# Patient Record
Sex: FEMALE | Race: WHITE | Employment: OTHER | URBAN - METROPOLITAN AREA
[De-identification: names, ages, dates, MRNs, and addresses within clinical notes are randomized per-mention and may not be internally consistent; named-entity substitution may affect disease eponyms.]

---

## 2018-03-26 ENCOUNTER — APPOINTMENT (OUTPATIENT)
Dept: CT IMAGING | Age: 78
End: 2018-03-26
Payer: MEDICARE

## 2018-03-26 ENCOUNTER — APPOINTMENT (OUTPATIENT)
Dept: GENERAL RADIOLOGY | Age: 78
End: 2018-03-26
Payer: MEDICARE

## 2018-03-26 ENCOUNTER — HOSPITAL ENCOUNTER (EMERGENCY)
Age: 78
Discharge: HOME | End: 2018-03-26
Attending: EMERGENCY MEDICINE
Payer: MEDICARE

## 2018-03-26 VITALS
HEART RATE: 66 BPM | DIASTOLIC BLOOD PRESSURE: 71 MMHG | HEIGHT: 63 IN | OXYGEN SATURATION: 99 % | TEMPERATURE: 98.1 F | BODY MASS INDEX: 22.15 KG/M2 | RESPIRATION RATE: 18 BRPM | WEIGHT: 125 LBS | SYSTOLIC BLOOD PRESSURE: 132 MMHG

## 2018-03-26 DIAGNOSIS — R42 DIZZINESS: ICD-10-CM

## 2018-03-26 DIAGNOSIS — H81.10 BENIGN PAROXYSMAL POSITIONAL VERTIGO, UNSPECIFIED LATERALITY: Primary | ICD-10-CM

## 2018-03-26 LAB
ALBUMIN SERPL-MCNC: 4.2 G/DL (ref 3.5–5.2)
ALP BLD-CCNC: 69 U/L (ref 35–104)
ALT SERPL-CCNC: 12 U/L (ref 0–32)
AMYLASE: 57 U/L (ref 20–100)
ANION GAP SERPL CALCULATED.3IONS-SCNC: 11 MMOL/L (ref 7–16)
AST SERPL-CCNC: 18 U/L (ref 0–31)
BILIRUB SERPL-MCNC: 0.4 MG/DL (ref 0–1.2)
BILIRUBIN DIRECT: <0.2 MG/DL (ref 0–0.3)
BILIRUBIN, INDIRECT: NORMAL MG/DL (ref 0–1)
BUN BLDV-MCNC: 10 MG/DL (ref 8–23)
CALCIUM SERPL-MCNC: 9.2 MG/DL (ref 8.6–10.2)
CHLORIDE BLD-SCNC: 102 MMOL/L (ref 98–107)
CK MB: 2 NG/ML (ref 0–4.3)
CO2: 27 MMOL/L (ref 22–29)
CREAT SERPL-MCNC: 0.7 MG/DL (ref 0.5–1)
EKG ATRIAL RATE: 59 BPM
EKG P AXIS: 58 DEGREES
EKG P-R INTERVAL: 154 MS
EKG Q-T INTERVAL: 408 MS
EKG QRS DURATION: 70 MS
EKG QTC CALCULATION (BAZETT): 403 MS
EKG R AXIS: 33 DEGREES
EKG T AXIS: 86 DEGREES
EKG VENTRICULAR RATE: 59 BPM
GFR AFRICAN AMERICAN: >60
GFR NON-AFRICAN AMERICAN: >60 ML/MIN/1.73
GLUCOSE BLD-MCNC: 105 MG/DL (ref 74–109)
HCT VFR BLD CALC: 41.1 % (ref 34–48)
HEMOGLOBIN: 13.2 G/DL (ref 11.5–15.5)
INR BLD: 1
LIPASE: 26 U/L (ref 13–60)
MAGNESIUM: 2.4 MG/DL (ref 1.6–2.6)
MCH RBC QN AUTO: 28.5 PG (ref 26–35)
MCHC RBC AUTO-ENTMCNC: 32.1 % (ref 32–34.5)
MCV RBC AUTO: 88.8 FL (ref 80–99.9)
PDW BLD-RTO: 13.9 FL (ref 11.5–15)
PLATELET # BLD: 295 E9/L (ref 130–450)
PMV BLD AUTO: 9.3 FL (ref 7–12)
POTASSIUM SERPL-SCNC: 4.1 MMOL/L (ref 3.5–5)
PRO-BNP: 43 PG/ML (ref 0–450)
PROTHROMBIN TIME: 10.7 SEC (ref 9.3–12.4)
RBC # BLD: 4.63 E12/L (ref 3.5–5.5)
SODIUM BLD-SCNC: 140 MMOL/L (ref 132–146)
TOTAL CK: 83 U/L (ref 20–180)
TOTAL PROTEIN: 7.4 G/DL (ref 6.4–8.3)
TROPONIN: <0.01 NG/ML (ref 0–0.03)
WBC # BLD: 6 E9/L (ref 4.5–11.5)

## 2018-03-26 PROCEDURE — 83735 ASSAY OF MAGNESIUM: CPT

## 2018-03-26 PROCEDURE — 82553 CREATINE MB FRACTION: CPT

## 2018-03-26 PROCEDURE — 93005 ELECTROCARDIOGRAM TRACING: CPT | Performed by: EMERGENCY MEDICINE

## 2018-03-26 PROCEDURE — 70450 CT HEAD/BRAIN W/O DYE: CPT

## 2018-03-26 PROCEDURE — 6360000002 HC RX W HCPCS: Performed by: EMERGENCY MEDICINE

## 2018-03-26 PROCEDURE — 84484 ASSAY OF TROPONIN QUANT: CPT

## 2018-03-26 PROCEDURE — 83880 ASSAY OF NATRIURETIC PEPTIDE: CPT

## 2018-03-26 PROCEDURE — 82150 ASSAY OF AMYLASE: CPT

## 2018-03-26 PROCEDURE — 99284 EMERGENCY DEPT VISIT MOD MDM: CPT

## 2018-03-26 PROCEDURE — 2580000003 HC RX 258: Performed by: EMERGENCY MEDICINE

## 2018-03-26 PROCEDURE — 96375 TX/PRO/DX INJ NEW DRUG ADDON: CPT

## 2018-03-26 PROCEDURE — 80048 BASIC METABOLIC PNL TOTAL CA: CPT

## 2018-03-26 PROCEDURE — 85027 COMPLETE CBC AUTOMATED: CPT

## 2018-03-26 PROCEDURE — 71045 X-RAY EXAM CHEST 1 VIEW: CPT

## 2018-03-26 PROCEDURE — 82550 ASSAY OF CK (CPK): CPT

## 2018-03-26 PROCEDURE — 80076 HEPATIC FUNCTION PANEL: CPT

## 2018-03-26 PROCEDURE — 83690 ASSAY OF LIPASE: CPT

## 2018-03-26 PROCEDURE — 6370000000 HC RX 637 (ALT 250 FOR IP): Performed by: EMERGENCY MEDICINE

## 2018-03-26 PROCEDURE — 85610 PROTHROMBIN TIME: CPT

## 2018-03-26 PROCEDURE — 96374 THER/PROPH/DIAG INJ IV PUSH: CPT

## 2018-03-26 RX ORDER — 0.9 % SODIUM CHLORIDE 0.9 %
500 INTRAVENOUS SOLUTION INTRAVENOUS ONCE
Status: COMPLETED | OUTPATIENT
Start: 2018-03-26 | End: 2018-03-26

## 2018-03-26 RX ORDER — MECLIZINE HYDROCHLORIDE 25 MG/1
25 TABLET ORAL 3 TIMES DAILY PRN
Qty: 12 TABLET | Refills: 0 | Status: SHIPPED | OUTPATIENT
Start: 2018-03-26 | End: 2018-04-05

## 2018-03-26 RX ORDER — DIPHENHYDRAMINE HYDROCHLORIDE 50 MG/ML
12.5 INJECTION INTRAMUSCULAR; INTRAVENOUS ONCE
Status: COMPLETED | OUTPATIENT
Start: 2018-03-26 | End: 2018-03-26

## 2018-03-26 RX ORDER — METOCLOPRAMIDE HYDROCHLORIDE 5 MG/ML
5 INJECTION INTRAMUSCULAR; INTRAVENOUS ONCE
Status: COMPLETED | OUTPATIENT
Start: 2018-03-26 | End: 2018-03-26

## 2018-03-26 RX ORDER — MECLIZINE HCL 12.5 MG/1
25 TABLET ORAL ONCE
Status: COMPLETED | OUTPATIENT
Start: 2018-03-26 | End: 2018-03-26

## 2018-03-26 RX ORDER — ASPIRIN 81 MG/1
324 TABLET, CHEWABLE ORAL ONCE
Status: COMPLETED | OUTPATIENT
Start: 2018-03-26 | End: 2018-03-26

## 2018-03-26 RX ADMIN — MECLIZINE 25 MG: 12.5 TABLET ORAL at 21:35

## 2018-03-26 RX ADMIN — ASPIRIN 81 MG 324 MG: 81 TABLET ORAL at 21:35

## 2018-03-26 RX ADMIN — DIPHENHYDRAMINE HYDROCHLORIDE 12.5 MG: 50 INJECTION, SOLUTION INTRAMUSCULAR; INTRAVENOUS at 21:36

## 2018-03-26 RX ADMIN — SODIUM CHLORIDE 500 ML: 9 INJECTION, SOLUTION INTRAVENOUS at 21:35

## 2018-03-26 RX ADMIN — METOCLOPRAMIDE 5 MG: 5 INJECTION, SOLUTION INTRAMUSCULAR; INTRAVENOUS at 21:36

## 2018-03-27 NOTE — ED PROVIDER NOTES
prepared under my direction and personally reviewed by me in its entirety. I confirm that the note above accurately reflects all work, treatment, procedures, and medical decision making performed by me.           Cristofer Asher MD  03/27/18 9179

## 2019-02-05 LAB
FOLATE: NORMAL
VITAMIN B-12: NORMAL

## 2019-11-12 LAB
ALBUMIN SERPL-MCNC: NORMAL G/DL
ALP BLD-CCNC: NORMAL U/L
ALT SERPL-CCNC: NORMAL U/L
ANION GAP SERPL CALCULATED.3IONS-SCNC: NORMAL MMOL/L
AST SERPL-CCNC: NORMAL U/L
BILIRUB SERPL-MCNC: NORMAL MG/DL
BUN BLDV-MCNC: NORMAL MG/DL
CALCIUM SERPL-MCNC: NORMAL MG/DL
CHLORIDE BLD-SCNC: NORMAL MMOL/L
CHOLESTEROL, TOTAL: NORMAL
CHOLESTEROL/HDL RATIO: NORMAL
CO2: NORMAL
CREAT SERPL-MCNC: NORMAL MG/DL
GFR CALCULATED: NORMAL
GLUCOSE BLD-MCNC: NORMAL MG/DL
HDLC SERPL-MCNC: NORMAL MG/DL
LDL CHOLESTEROL CALCULATED: NORMAL
NONHDLC SERPL-MCNC: NORMAL MG/DL
POTASSIUM SERPL-SCNC: NORMAL MMOL/L
SODIUM BLD-SCNC: NORMAL MMOL/L
TOTAL PROTEIN: NORMAL
TRIGL SERPL-MCNC: NORMAL MG/DL
VLDLC SERPL CALC-MCNC: NORMAL MG/DL

## 2020-07-27 RX ORDER — LORAZEPAM 0.5 MG/1
0.5 TABLET ORAL 2 TIMES DAILY
Qty: 60 TABLET | Refills: 2 | Status: SHIPPED | OUTPATIENT
Start: 2020-07-27 | End: 2020-08-26

## 2020-07-27 RX ORDER — LORAZEPAM 0.5 MG/1
1 TABLET ORAL 2 TIMES DAILY
COMMUNITY
Start: 2020-07-01 | End: 2020-07-27 | Stop reason: SDUPTHER

## 2020-08-24 ENCOUNTER — NURSE ONLY (OUTPATIENT)
Dept: FAMILY MEDICINE CLINIC | Age: 80
End: 2020-08-24
Payer: MEDICARE

## 2020-08-24 ENCOUNTER — HOSPITAL ENCOUNTER (OUTPATIENT)
Age: 80
Discharge: HOME | End: 2020-08-26
Payer: MEDICARE

## 2020-08-24 PROCEDURE — 82607 VITAMIN B-12: CPT

## 2020-08-24 PROCEDURE — 84443 ASSAY THYROID STIM HORMONE: CPT

## 2020-08-24 PROCEDURE — 36415 COLL VENOUS BLD VENIPUNCTURE: CPT | Performed by: NURSE PRACTITIONER

## 2020-08-24 PROCEDURE — 80061 LIPID PANEL: CPT

## 2020-08-24 PROCEDURE — 80053 COMPREHEN METABOLIC PANEL: CPT

## 2020-08-24 PROCEDURE — 85025 COMPLETE CBC W/AUTO DIFF WBC: CPT

## 2020-08-24 PROCEDURE — 84439 ASSAY OF FREE THYROXINE: CPT

## 2020-08-25 LAB
ALBUMIN SERPL-MCNC: 4.2 G/DL (ref 3.5–5.2)
ALP BLD-CCNC: 64 U/L (ref 35–104)
ALT SERPL-CCNC: 12 U/L (ref 0–32)
ANION GAP SERPL CALCULATED.3IONS-SCNC: 12 MMOL/L (ref 7–16)
AST SERPL-CCNC: 21 U/L (ref 0–31)
BASOPHILS ABSOLUTE: 0.06 E9/L (ref 0–0.2)
BASOPHILS RELATIVE PERCENT: 1.2 % (ref 0–2)
BILIRUB SERPL-MCNC: 0.4 MG/DL (ref 0–1.2)
BUN BLDV-MCNC: 11 MG/DL (ref 8–23)
CALCIUM SERPL-MCNC: 9.4 MG/DL (ref 8.6–10.2)
CHLORIDE BLD-SCNC: 105 MMOL/L (ref 98–107)
CHOLESTEROL, TOTAL: 246 MG/DL (ref 0–199)
CO2: 25 MMOL/L (ref 22–29)
CREAT SERPL-MCNC: 0.7 MG/DL (ref 0.5–1)
EOSINOPHILS ABSOLUTE: 0.08 E9/L (ref 0.05–0.5)
EOSINOPHILS RELATIVE PERCENT: 1.6 % (ref 0–6)
GFR AFRICAN AMERICAN: >60
GFR NON-AFRICAN AMERICAN: >60 ML/MIN/1.73
GLUCOSE BLD-MCNC: 88 MG/DL (ref 74–99)
HCT VFR BLD CALC: 44.9 % (ref 34–48)
HDLC SERPL-MCNC: 53 MG/DL
HEMOGLOBIN: 13.5 G/DL (ref 11.5–15.5)
IMMATURE GRANULOCYTES #: 0 E9/L
IMMATURE GRANULOCYTES %: 0 % (ref 0–5)
LDL CHOLESTEROL CALCULATED: 168 MG/DL (ref 0–99)
LYMPHOCYTES ABSOLUTE: 1.87 E9/L (ref 1.5–4)
LYMPHOCYTES RELATIVE PERCENT: 36.6 % (ref 20–42)
MCH RBC QN AUTO: 28.6 PG (ref 26–35)
MCHC RBC AUTO-ENTMCNC: 30.1 % (ref 32–34.5)
MCV RBC AUTO: 95.1 FL (ref 80–99.9)
MONOCYTES ABSOLUTE: 0.38 E9/L (ref 0.1–0.95)
MONOCYTES RELATIVE PERCENT: 7.4 % (ref 2–12)
NEUTROPHILS ABSOLUTE: 2.72 E9/L (ref 1.8–7.3)
NEUTROPHILS RELATIVE PERCENT: 53.2 % (ref 43–80)
PDW BLD-RTO: 14.1 FL (ref 11.5–15)
PLATELET # BLD: 332 E9/L (ref 130–450)
PMV BLD AUTO: 10 FL (ref 7–12)
POTASSIUM SERPL-SCNC: 5.1 MMOL/L (ref 3.5–5)
RBC # BLD: 4.72 E12/L (ref 3.5–5.5)
SODIUM BLD-SCNC: 142 MMOL/L (ref 132–146)
T4 FREE: 1.08 NG/DL (ref 0.93–1.7)
TOTAL PROTEIN: 7.4 G/DL (ref 6.4–8.3)
TRIGL SERPL-MCNC: 126 MG/DL (ref 0–149)
TSH SERPL DL<=0.05 MIU/L-ACNC: 2.66 UIU/ML (ref 0.27–4.2)
VITAMIN B-12: 209 PG/ML (ref 211–946)
VLDLC SERPL CALC-MCNC: 25 MG/DL
WBC # BLD: 5.1 E9/L (ref 4.5–11.5)

## 2020-08-25 RX ORDER — SERTRALINE HYDROCHLORIDE 25 MG/1
25 TABLET, FILM COATED ORAL DAILY
Qty: 90 TABLET | Refills: 1 | Status: SHIPPED
Start: 2020-08-25 | End: 2020-08-31 | Stop reason: ALTCHOICE

## 2020-08-25 RX ORDER — SERTRALINE HYDROCHLORIDE 25 MG/1
TABLET, FILM COATED ORAL
COMMUNITY
Start: 2020-08-04 | End: 2020-08-25 | Stop reason: SDUPTHER

## 2020-08-27 RX ORDER — LORAZEPAM 0.5 MG/1
0.5 TABLET ORAL 2 TIMES DAILY
COMMUNITY
End: 2020-10-12 | Stop reason: SDUPTHER

## 2020-08-27 RX ORDER — KETOTIFEN FUMARATE 0.35 MG/ML
1 SOLUTION/ DROPS OPHTHALMIC DAILY
COMMUNITY
End: 2022-09-22

## 2020-08-31 ENCOUNTER — OFFICE VISIT (OUTPATIENT)
Dept: FAMILY MEDICINE CLINIC | Age: 80
End: 2020-08-31
Payer: MEDICARE

## 2020-08-31 VITALS
DIASTOLIC BLOOD PRESSURE: 70 MMHG | SYSTOLIC BLOOD PRESSURE: 110 MMHG | RESPIRATION RATE: 18 BRPM | BODY MASS INDEX: 24.62 KG/M2 | HEIGHT: 61 IN | WEIGHT: 130.4 LBS | HEART RATE: 78 BPM | TEMPERATURE: 98.1 F

## 2020-08-31 PROCEDURE — 99214 OFFICE O/P EST MOD 30 MIN: CPT | Performed by: NURSE PRACTITIONER

## 2020-08-31 PROCEDURE — G8431 POS CLIN DEPRES SCRN F/U DOC: HCPCS | Performed by: NURSE PRACTITIONER

## 2020-08-31 ASSESSMENT — ENCOUNTER SYMPTOMS
EYE REDNESS: 0
STRIDOR: 0
VOMITING: 0
BACK PAIN: 0
SORE THROAT: 0
SINUS PAIN: 0
CHEST TIGHTNESS: 0
APNEA: 0
CONSTIPATION: 0
DIARRHEA: 0
RECTAL PAIN: 0
SHORTNESS OF BREATH: 0
COLOR CHANGE: 0
RHINORRHEA: 0
BLOOD IN STOOL: 0
NAUSEA: 0
PHOTOPHOBIA: 0
EYE PAIN: 0
ABDOMINAL DISTENTION: 0
VOICE CHANGE: 0
WHEEZING: 0
TROUBLE SWALLOWING: 0
EYE DISCHARGE: 0
CHOKING: 0
ANAL BLEEDING: 0
SINUS PRESSURE: 0
FACIAL SWELLING: 0
COUGH: 0
EYE ITCHING: 0
ABDOMINAL PAIN: 0

## 2020-08-31 ASSESSMENT — PATIENT HEALTH QUESTIONNAIRE - PHQ9
6. FEELING BAD ABOUT YOURSELF - OR THAT YOU ARE A FAILURE OR HAVE LET YOURSELF OR YOUR FAMILY DOWN: 3
SUM OF ALL RESPONSES TO PHQ QUESTIONS 1-9: 15
4. FEELING TIRED OR HAVING LITTLE ENERGY: 0
7. TROUBLE CONCENTRATING ON THINGS, SUCH AS READING THE NEWSPAPER OR WATCHING TELEVISION: 0
5. POOR APPETITE OR OVEREATING: 3
SUM OF ALL RESPONSES TO PHQ9 QUESTIONS 1 & 2: 6
1. LITTLE INTEREST OR PLEASURE IN DOING THINGS: 3
3. TROUBLE FALLING OR STAYING ASLEEP: 0
9. THOUGHTS THAT YOU WOULD BE BETTER OFF DEAD, OR OF HURTING YOURSELF: 0
SUM OF ALL RESPONSES TO PHQ QUESTIONS 1-9: 15
10. IF YOU CHECKED OFF ANY PROBLEMS, HOW DIFFICULT HAVE THESE PROBLEMS MADE IT FOR YOU TO DO YOUR WORK, TAKE CARE OF THINGS AT HOME, OR GET ALONG WITH OTHER PEOPLE: 1
8. MOVING OR SPEAKING SO SLOWLY THAT OTHER PEOPLE COULD HAVE NOTICED. OR THE OPPOSITE, BEING SO FIGETY OR RESTLESS THAT YOU HAVE BEEN MOVING AROUND A LOT MORE THAN USUAL: 3
2. FEELING DOWN, DEPRESSED OR HOPELESS: 3

## 2020-08-31 NOTE — PROGRESS NOTES
20  Rusty James : 1940 Sex: female  Age: [de-identified] y.o. Chief Complaint   Patient presents with   [facility]     no issues to discuss today. Here for lab review. States feels somewhat depressed. Has a lot of stress at home with her 's care. Review of Systems   Constitutional: Negative for activity change, appetite change, chills, diaphoresis, fatigue, fever and unexpected weight change. HENT: Negative for congestion, dental problem, drooling, ear discharge, ear pain, facial swelling, hearing loss, mouth sores, nosebleeds, postnasal drip, rhinorrhea, sinus pressure, sinus pain, sneezing, sore throat, tinnitus, trouble swallowing and voice change. Eyes: Negative for photophobia, pain, discharge, redness, itching and visual disturbance. Respiratory: Negative for apnea, cough, choking, chest tightness, shortness of breath, wheezing and stridor. Cardiovascular: Negative for chest pain, palpitations and leg swelling. Gastrointestinal: Negative for abdominal distention, abdominal pain, anal bleeding, blood in stool, constipation, diarrhea, nausea, rectal pain and vomiting. Endocrine: Negative for cold intolerance, heat intolerance, polydipsia, polyphagia and polyuria. Genitourinary: Negative for decreased urine volume, difficulty urinating, dysuria, enuresis, flank pain, frequency, genital sores, hematuria and urgency. Musculoskeletal: Negative for arthralgias, back pain, gait problem, joint swelling, myalgias, neck pain and neck stiffness. Skin: Negative for color change, pallor, rash and wound. Allergic/Immunologic: Negative for environmental allergies, food allergies and immunocompromised state. Neurological: Negative for dizziness, tremors, seizures, syncope, facial asymmetry, speech difficulty, weakness, light-headedness, numbness and headaches. Hematological: Negative for adenopathy. Does not bruise/bleed easily.    Psychiatric/Behavioral: Positive for dysphoric mood and sleep disturbance. Negative for agitation, behavioral problems, confusion, decreased concentration, hallucinations, self-injury and suicidal ideas. The patient is nervous/anxious. The patient is not hyperactive. Current Outpatient Medications:     sertraline (ZOLOFT) 50 MG tablet, Take 1 tablet by mouth daily, Disp: 90 tablet, Rfl: 1    ketotifen (ALAWAY) 0.025 % ophthalmic solution, Place 1 drop into the right eye daily, Disp: , Rfl:     LORazepam (ATIVAN) 0.5 MG tablet, Take 0.5 mg by mouth 2 times daily. , Disp: , Rfl:     polyethyl glycol-propyl glycol 0.4-0.3 % (SYSTANE) 0.4-0.3 % ophthalmic solution, Place 1 drop into both eyes daily, Disp: , Rfl:     nystatin-triamcinolone (MYCOLOG II) 016410-5.1 UNIT/GM-% cream, Apply topically 2 times daily. , Disp: 1 Tube, Rfl: 2  Allergies   Allergen Reactions    Adhesive Tape     Pravachol [Pravastatin]     Toprol Xl [Metoprolol]     Zithromax [Azithromycin]        Past Medical History:   Diagnosis Date    Anxiety     Arthritis     Cancer ([facility] [address]     Depression     Eye disorder     Hives     Hyperlipidemia     Hypertension     Measles     Melanocytoma of retina     Osteoporosis     Thyroid disease     Vitamin B12 deficiency (non anemic)     Whooping cough      Past Surgical History:   Procedure Laterality Date    CARPAL TUNNEL RELEASE      CARPAL TUNNEL RELEASE Left     HAND    EYE SURGERY      EYE SURGERY Left     EXTRACTED LEFT EYE - CANCER    JOINT REPLACEMENT      SHOULDER ARTHROPLASTY Right 12/22/2014    REVERSE    SHOULDER ARTHROPLASTY Bilateral     THYROIDECTOMY      TUBAL LIGATION      UTERINE SUSPENSION       Family History   Problem Relation Age of Onset    High Blood Pressure Mother     Other Sister         CEREBRAL INFARCTION    High Cholesterol Sister     High Blood Pressure Sister     High Cholesterol Brother     Heart Failure Brother     Cancer Brother     Diabetes Brother     Other Brother CHRONIC LUNG DISEASE    Heart Attack Brother     Breast Cancer Niece      Social History     Socioeconomic History    Marital status:      Spouse name: Not on file    Number of children: Not on file    Years of education: Not on file    Highest education level: Not on file   Occupational History    Not on file   Social Needs    Financial resource strain: Not on file    Food insecurity     Worry: Not on file     Inability: Not on file    Transportation needs     Medical: Not on file     Non-medical: Not on file   Tobacco Use    Smoking status: Never Smoker    Smokeless tobacco: Never Used   Substance and Sexual Activity    Alcohol use: No    Drug use: No    Sexual activity: Not on file   Lifestyle    Physical activity     Days per week: Not on file     Minutes per session: Not on file    Stress: Not on file   Relationships    Social connections     Talks on phone: Not on file     Gets together: Not on file     Attends Orthodoxy service: Not on file     Active member of club or organization: Not on file     Attends meetings of clubs or organizations: Not on file     Relationship status: Not on file    Intimate partner violence     Fear of current or ex partner: Not on file     Emotionally abused: Not on file     Physically abused: Not on file     Forced sexual activity: Not on file   Other Topics Concern    Not on file   Social History Narrative    Not on file     Patient Active Problem List   Diagnosis    Benign paroxysmal positional vertigo    Dizziness    Vitamin B12 deficiency (non anemic)    Thyroid disease    Osteoporosis    Hypertension    Hyperlipidemia    Cancer ([facility] [address]    Anxiety    Arthritis        Vitals:    08/31/20 1559   BP: 110/70   Pulse: 78   Resp: 18   Temp: 98.1 °F (36.7 °C)   TempSrc: Temporal   Weight: 130 lb 6.4 oz (59.1 kg)   Height: 5' 1\" (1.549 m)       Physical Exam  Vitals signs and nursing note reviewed.    Constitutional:       General: She is not in acute distress. Appearance: Normal appearance. She is normal weight. She is not ill-appearing, toxic-appearing or diaphoretic. HENT:      Head: Normocephalic and atraumatic. Right Ear: Tympanic membrane, ear canal and external ear normal. There is no impacted cerumen. Left Ear: Tympanic membrane, ear canal and external ear normal. There is no impacted cerumen. Nose: Nose normal. No congestion or rhinorrhea. Mouth/Throat:      Mouth: Mucous membranes are moist.      Pharynx: Oropharynx is clear. No oropharyngeal exudate or posterior oropharyngeal erythema. Eyes:      General: No scleral icterus. Right eye: No discharge. Left eye: No discharge. Extraocular Movements: Extraocular movements intact. Conjunctiva/sclera: Conjunctivae normal.      Pupils: Pupils are equal, round, and reactive to light. Neck:      Musculoskeletal: Normal range of motion and neck supple. No neck rigidity or muscular tenderness. Vascular: No carotid bruit. Cardiovascular:      Rate and Rhythm: Normal rate and regular rhythm. Pulses: Normal pulses. Heart sounds: Normal heart sounds. No murmur. No friction rub. No gallop. Pulmonary:      Effort: Pulmonary effort is normal. No respiratory distress. Breath sounds: No stridor. No wheezing, rhonchi or rales. Chest:      Chest wall: No tenderness. Abdominal:      General: Abdomen is flat. Bowel sounds are normal. There is no distension. Palpations: Abdomen is soft. There is no mass. Tenderness: There is no abdominal tenderness. There is no right CVA tenderness, left CVA tenderness, guarding or rebound. Hernia: No hernia is present. Musculoskeletal: Normal range of motion. General: No swelling, tenderness, deformity or signs of injury. Right lower leg: No edema. Left lower leg: No edema. Lymphadenopathy:      Cervical: No cervical adenopathy.    Skin:     General: Skin is warm and dry.      Capillary Refill: Capillary refill takes less than 2 seconds. Coloration: Skin is not jaundiced or pale. Findings: No bruising, erythema, lesion or rash. Neurological:      General: No focal deficit present. Mental Status: She is alert and oriented to person, place, and time. Mental status is at baseline. Cranial Nerves: No cranial nerve deficit. Sensory: No sensory deficit. Motor: No weakness. Coordination: Coordination normal.      Gait: Gait normal.      Deep Tendon Reflexes: Reflexes normal.   Psychiatric:         Behavior: Behavior normal.         Thought Content: Thought content normal.         Judgment: Judgment normal.      Comments: Depressed         Assessment and Plan:  Marine Phipps was seen today for discuss labs. Diagnoses and all orders for this visit:    Depression, unspecified depression type    Benign paroxysmal positional vertigo, unspecified laterality    Vitamin B12 deficiency (non anemic)    Thyroid disease    Age-related osteoporosis without current pathological fracture    Essential hypertension    Mixed hyperlipidemia    Cancer ([facility] [address]    Anxiety    Arthritis    Other orders  -     sertraline (ZOLOFT) 50 MG tablet; Take 1 tablet by mouth daily        Discussions/Education provided to patients during visit:  [] Discussed the importance to stop smoking. [x] Advised to monitor eating habits. [] Reviewed and discussed Imaging results. [] Reviewed and discussed Lab results. [] Discussed the importance of drinking plenty of fluids. [] Cut down on Salt and Caffeine.  [] Exercise 2-3 times weekly, if not more. [x] Cut down on Sugar and Fats. [x] Continue Medications as Discussed. [x] Communicated with patient any concerns, to phone office. [x] Follow up as directed. Return in about 1 month (around 9/30/2020) for depression.       Seen By:  TORI Mack CNP    On the basis of positive PHQ-9 screening (PHQ-9 Total Score: 15), the following plan was implemented: medication prescribed: Zoloft- 50 mg- patient will call for any significant medication side effects or worsening symptoms of depression. Patient will follow-up in 1 month(s) with PCP.

## 2020-10-12 ENCOUNTER — OFFICE VISIT (OUTPATIENT)
Dept: FAMILY MEDICINE CLINIC | Age: 80
End: 2020-10-12
Payer: MEDICARE

## 2020-10-12 VITALS
WEIGHT: 134.1 LBS | HEIGHT: 61 IN | OXYGEN SATURATION: 95 % | HEART RATE: 70 BPM | TEMPERATURE: 97.2 F | BODY MASS INDEX: 25.32 KG/M2 | DIASTOLIC BLOOD PRESSURE: 72 MMHG | SYSTOLIC BLOOD PRESSURE: 114 MMHG

## 2020-10-12 PROBLEM — B02.9 SHINGLES RASH: Status: ACTIVE | Noted: 2020-10-12

## 2020-10-12 PROCEDURE — 99214 OFFICE O/P EST MOD 30 MIN: CPT | Performed by: FAMILY MEDICINE

## 2020-10-12 RX ORDER — LORAZEPAM 0.5 MG/1
0.5 TABLET ORAL 2 TIMES DAILY
Qty: 60 TABLET | Refills: 2 | Status: SHIPPED | OUTPATIENT
Start: 2020-10-12 | End: 2020-11-11

## 2020-10-12 RX ORDER — FAMCICLOVIR 500 MG/1
500 TABLET, FILM COATED ORAL 3 TIMES DAILY
Qty: 21 TABLET | Refills: 0 | Status: SHIPPED | OUTPATIENT
Start: 2020-10-12 | End: 2020-10-19

## 2020-10-12 RX ORDER — SERTRALINE HYDROCHLORIDE 25 MG/1
25 TABLET, FILM COATED ORAL DAILY
COMMUNITY
End: 2020-10-30

## 2020-10-12 RX ORDER — LANOLIN ALCOHOL/MO/W.PET/CERES
1000 CREAM (GRAM) TOPICAL DAILY
COMMUNITY
End: 2021-01-29 | Stop reason: CLARIF

## 2020-10-12 NOTE — PROGRESS NOTES
10/12/20  Apryl Zurita : 1940 Sex: female  Age: 79 y.o. Chief Complaint   Patient presents with    Hypertension    Hyperlipidemia    Pain     under left breast. Pain shoots to left side sometimes. Wearing brace which seems to help.  Other     Wants to talk to Dr. David Tuttle about spots on forehead - sore and sometimes itchy and about getting the other shingles shot. Check up overall situation   Having bad thoughts from zoloft   Eye problems seeing Dr Davie Garrett shingles still comining out on face     Hypertension   Pertinent negatives include no chest pain, headaches, neck pain, palpitations or shortness of breath. Hyperlipidemia   Pertinent negatives include no chest pain, myalgias or shortness of breath. Other   Associated symptoms include arthralgias and weakness. Pertinent negatives include no abdominal pain, chest pain, chills, congestion, coughing, diaphoresis, fatigue, fever, headaches, joint swelling, myalgias, nausea, neck pain, numbness, rash, sore throat or vomiting. Current Outpatient Medications:     sertraline (ZOLOFT) 25 MG tablet, Take 25 mg by mouth daily, Disp: , Rfl:     vitamin B-12 (CYANOCOBALAMIN) 1000 MCG tablet, Take 1,000 mcg by mouth daily, Disp: , Rfl:     LORazepam (ATIVAN) 0.5 MG tablet, Take 1 tablet by mouth 2 times daily for 30 days. , Disp: 60 tablet, Rfl: 2    famciclovir (FAMVIR) 500 MG tablet, Take 1 tablet by mouth 3 times daily for 7 days, Disp: 21 tablet, Rfl: 0    ketotifen (ALAWAY) 0.025 % ophthalmic solution, Place 1 drop into the right eye daily, Disp: , Rfl:     polyethyl glycol-propyl glycol 0.4-0.3 % (SYSTANE) 0.4-0.3 % ophthalmic solution, Place 1 drop into both eyes daily, Disp: , Rfl:     nystatin-triamcinolone (MYCOLOG II) 003859-9.1 UNIT/GM-% cream, Apply topically 2 times daily.  (Patient taking differently: Apply topically 2 times daily prn), Disp: 1 Tube, Rfl: 2  Allergies   Allergen Reactions    Adhesive Tape     Pravachol [Pravastatin]     Toprol Xl [Metoprolol]     Zithromax [Azithromycin]        Review of Systems   Constitutional: Negative for appetite change, chills, diaphoresis, fatigue, fever and unexpected weight change. HENT: Negative for congestion, dental problem, ear discharge, ear pain, facial swelling, hearing loss, mouth sores, rhinorrhea, sinus pain, sore throat, tinnitus and voice change. Eyes: Positive for pain (shingles in remaining eye other eye glass eyee carcinoma eye ). Negative for photophobia and visual disturbance (no visual changes). Respiratory: Negative for apnea, cough, chest tightness, shortness of breath and wheezing. Cardiovascular: Negative for chest pain and palpitations. Gastrointestinal: Negative for abdominal distention, abdominal pain, constipation, diarrhea, nausea, rectal pain and vomiting. Endocrine: Negative for cold intolerance, heat intolerance and polyuria. Genitourinary: Negative for dysuria, frequency, hematuria and urgency. Musculoskeletal: Positive for arthralgias and back pain. Negative for gait problem, joint swelling, myalgias, neck pain and neck stiffness. Skin: Negative for rash and wound. Allergic/Immunologic: Negative for environmental allergies. Neurological: Positive for weakness. Negative for dizziness, tremors, seizures, syncope, facial asymmetry, speech difficulty, light-headedness, numbness and headaches. Hematological: Does not bruise/bleed easily. Psychiatric/Behavioral: Negative for agitation, hallucinations and suicidal ideas. The patient is nervous/anxious.         Past Medical History:   Diagnosis Date    Anxiety     Arthritis     Cancer ([facility] [address]     Depression     Eye disorder     Hives     Hyperlipidemia     Hypertension     Measles     Melanocytoma of retina     Osteoporosis     Thyroid disease     Vitamin B12 deficiency (non anemic)     Whooping cough      Past Surgical History:   Procedure Laterality Date    CARPAL TUNNEL RELEASE      CARPAL TUNNEL RELEASE Left     HAND    EYE SURGERY      EYE SURGERY Left     EXTRACTED LEFT EYE - CANCER    JOINT REPLACEMENT      SHOULDER ARTHROPLASTY Right 12/22/2014    REVERSE    SHOULDER ARTHROPLASTY Bilateral     THYROIDECTOMY      TUBAL LIGATION      UTERINE SUSPENSION       Family History   Problem Relation Age of Onset    High Blood Pressure Mother     Other Sister         CEREBRAL INFARCTION    High Cholesterol Sister     High Blood Pressure Sister     High Cholesterol Brother     Heart Failure Brother     Cancer Brother     Diabetes Brother     Other Brother         CHRONIC LUNG DISEASE    Heart Attack Brother     Breast Cancer Niece      Social History     Socioeconomic History    Marital status:      Spouse name: Not on file    Number of children: Not on file    Years of education: Not on file    Highest education level: Not on file   Occupational History    Not on file   Social Needs    Financial resource strain: Not on file    Food insecurity     Worry: Not on file     Inability: Not on file    Transportation needs     Medical: Not on file     Non-medical: Not on file   Tobacco Use    Smoking status: Never Smoker    Smokeless tobacco: Never Used   Substance and Sexual Activity    Alcohol use: No    Drug use: No    Sexual activity: Not on file   Lifestyle    Physical activity     Days per week: Not on file     Minutes per session: Not on file    Stress: Not on file   Relationships    Social connections     Talks on phone: Not on file     Gets together: Not on file     Attends [employer]ism service: Not on file     Active member of club or organization: Not on file     Attends meetings of clubs or organizations: Not on file     Relationship status: Not on file    Intimate partner violence     Fear of current or ex partner: Not on file     Emotionally abused: Not on file     Physically abused: Not on file     Forced sexual activity: Not on file   Other Topics Concern    Not on file   Social History Narrative    Not on file       Patient Active Problem List   Diagnosis    Benign paroxysmal positional vertigo    Dizziness    Vitamin B12 deficiency (non anemic)    Thyroid disease    Osteoporosis    Hypertension    Hyperlipidemia    Cancer ([address]    Anxiety    Arthritis    Shingles rash        Vitals:    10/12/20 1039   BP: 114/72   Pulse: 70   Temp: 97.2 °F (36.2 °C)   TempSrc: Temporal   SpO2: 95%   Weight: 134 lb 1.6 oz (60.8 kg)   Height: 5' 1\" (1.549 m)       Physical Exam  Vitals signs reviewed. Constitutional:       General: She is not in acute distress. Appearance: She is not ill-appearing. HENT:      Head: Normocephalic. Right Ear: Tympanic membrane, ear canal and external ear normal. There is no impacted cerumen. Left Ear: Tympanic membrane, ear canal and external ear normal. There is no impacted cerumen. Nose: No rhinorrhea. Mouth/Throat:      Pharynx: No posterior oropharyngeal erythema. Eyes:      General:         Right eye: No discharge. Left eye: No discharge. Extraocular Movements: Extraocular movements intact. Pupils: Pupils are equal, round, and reactive to light. Neck:      Musculoskeletal: Neck supple. Vascular: No carotid bruit. Cardiovascular:      Rate and Rhythm: Regular rhythm. Heart sounds: Normal heart sounds. No murmur. No gallop. Pulmonary:      Breath sounds: Normal breath sounds. No wheezing, rhonchi or rales. Abdominal:      Palpations: Abdomen is soft. There is no mass. Tenderness: There is no abdominal tenderness. There is no guarding or rebound. Hernia: No hernia is present. Musculoskeletal: Normal range of motion. General: Tenderness (low back ) present. Lymphadenopathy:      Cervical: No cervical adenopathy. Skin:     General: Skin is warm and dry. Findings: No bruising or rash.    Neurological:      General: No focal deficit present. Mental Status: She is alert and oriented to person, place, and time. Cranial Nerves: No cranial nerve deficit. Motor: No weakness. Coordination: Coordination normal.      Gait: Gait normal.   Psychiatric:         Mood and Affect: Mood normal.         Behavior: Behavior normal.         Assessment and Plan:  Travon Fong was seen today for hypertension, hyperlipidemia, pain and other. Diagnoses and all orders for this visit:    Essential hypertension  -     T4, Free; Future  -     Folate; Future    Thyroid disease  -     T4, Free; Future  -     TSH without Reflex; Future    Vitamin B12 deficiency (non anemic)  -     Vitamin B12; Future  -     T4, Free; Future  -     Folate; Future    Mixed hyperlipidemia    Anxiety  -     LORazepam (ATIVAN) 0.5 MG tablet; Take 1 tablet by mouth 2 times daily for 30 days. Herpes zoster with ophthalmic complication, unspecified herpes zoster eye disease    Arthritis    Age-related osteoporosis without current pathological fracture    Other orders  -     famciclovir (FAMVIR) 500 MG tablet; Take 1 tablet by mouth 3 times daily for 7 days          Discussions/Education provided to patients during visit:  [] Discussed the importance to stop smoking. [] Advised to monitor eating habits. [] Reviewed and discussed Imaging results. [] Reviewed and discussed Lab results. [] Discussed the importance of drinking plenty of fluids. [] Cut down on Salt, Caffeine, and Sugar. [x] Continue Medications as Discussed. [x] Communicated with patient any concerns, to phone office. Jackson zoloft   Continue ativan         No follow-ups on file.       Seen By:  Lexie Alarcon DO

## 2020-10-21 ENCOUNTER — TELEPHONE (OUTPATIENT)
Dept: FAMILY MEDICINE CLINIC | Age: 80
End: 2020-10-21

## 2020-10-23 ENCOUNTER — HOSPITAL ENCOUNTER (OUTPATIENT)
Age: 80
Discharge: HOME | End: 2020-10-25
Payer: MEDICARE

## 2020-10-23 ENCOUNTER — NURSE ONLY (OUTPATIENT)
Dept: FAMILY MEDICINE CLINIC | Age: 80
End: 2020-10-23
Payer: MEDICARE

## 2020-10-23 LAB
FOLATE: 16.8 NG/ML (ref 4.8–24.2)
T4 FREE: 1.08 NG/DL (ref 0.93–1.7)
TSH SERPL DL<=0.05 MIU/L-ACNC: 2.52 UIU/ML (ref 0.27–4.2)
VITAMIN B-12: 597 PG/ML (ref 211–946)

## 2020-10-23 PROCEDURE — 84439 ASSAY OF FREE THYROXINE: CPT

## 2020-10-23 PROCEDURE — 82746 ASSAY OF FOLIC ACID SERUM: CPT

## 2020-10-23 PROCEDURE — 36415 COLL VENOUS BLD VENIPUNCTURE: CPT | Performed by: FAMILY MEDICINE

## 2020-10-23 PROCEDURE — 84443 ASSAY THYROID STIM HORMONE: CPT

## 2020-10-23 PROCEDURE — 82607 VITAMIN B-12: CPT

## 2020-10-25 ASSESSMENT — ENCOUNTER SYMPTOMS
SINUS PAIN: 0
DIARRHEA: 0
RECTAL PAIN: 0
ABDOMINAL PAIN: 0
RHINORRHEA: 0
ABDOMINAL DISTENTION: 0
WHEEZING: 0
FACIAL SWELLING: 0
VOMITING: 0
SORE THROAT: 0
BACK PAIN: 1
EYE PAIN: 1
VOICE CHANGE: 0
PHOTOPHOBIA: 0
COUGH: 0
APNEA: 0
CHEST TIGHTNESS: 0
CONSTIPATION: 0
NAUSEA: 0
SHORTNESS OF BREATH: 0

## 2020-10-30 ENCOUNTER — OFFICE VISIT (OUTPATIENT)
Dept: FAMILY MEDICINE CLINIC | Age: 80
End: 2020-10-30
Payer: MEDICARE

## 2020-10-30 VITALS
BODY MASS INDEX: 24.64 KG/M2 | TEMPERATURE: 97 F | WEIGHT: 133.9 LBS | HEIGHT: 62 IN | HEART RATE: 84 BPM | OXYGEN SATURATION: 96 %

## 2020-10-30 PROCEDURE — 99214 OFFICE O/P EST MOD 30 MIN: CPT | Performed by: FAMILY MEDICINE

## 2020-10-30 ASSESSMENT — ENCOUNTER SYMPTOMS
DIARRHEA: 0
ABDOMINAL DISTENTION: 0
APNEA: 0
VOICE CHANGE: 0
ABDOMINAL PAIN: 0
SINUS PAIN: 0
CHEST TIGHTNESS: 0
VOMITING: 0
RECTAL PAIN: 0
CONSTIPATION: 0
PHOTOPHOBIA: 0
SHORTNESS OF BREATH: 0
FACIAL SWELLING: 0
BACK PAIN: 0
NAUSEA: 0
WHEEZING: 0
SORE THROAT: 0
RHINORRHEA: 0
COUGH: 0

## 2020-10-30 NOTE — PROGRESS NOTES
10/30/20  Macy Raza : 1940 Sex: female  Age: 79 y.o. Chief Complaint   Patient presents with    Results     blood work    Other     pt wasts to [email or web address] about all her aches and pains of being 80\"       Shingles pain and breakout remains   Left rib pain  Remains   Overall anxiety and depression           Current Outpatient Medications:     vitamin B-12 (CYANOCOBALAMIN) 1000 MCG tablet, Take 1,000 mcg by mouth daily, Disp: , Rfl:     LORazepam (ATIVAN) 0.5 MG tablet, Take 1 tablet by mouth 2 times daily for 30 days. , Disp: 60 tablet, Rfl: 2    ketotifen (ALAWAY) 0.025 % ophthalmic solution, Place 1 drop into the right eye daily, Disp: , Rfl:     polyethyl glycol-propyl glycol 0.4-0.3 % (SYSTANE) 0.4-0.3 % ophthalmic solution, Place 1 drop into both eyes daily, Disp: , Rfl:     nystatin-triamcinolone (MYCOLOG II) 144758-1.1 UNIT/GM-% cream, Apply topically 2 times daily. (Patient not taking: Reported on 10/30/2020), Disp: 1 Tube, Rfl: 2  Allergies   Allergen Reactions    Adhesive Tape     Pravachol [Pravastatin]     Toprol Xl [Metoprolol]     Zithromax [Azithromycin]        Review of Systems   Constitutional: Negative for appetite change, chills, diaphoresis, fatigue, fever and unexpected weight change. HENT: Negative for congestion, dental problem, ear discharge, ear pain, facial swelling, hearing loss, mouth sores, rhinorrhea, sinus pain, sore throat, tinnitus and voice change. Eyes: Positive for visual disturbance (dr Moose Pope  wrinkle in rigth left is cancer removed ). Negative for photophobia. Respiratory: Negative for apnea, cough, chest tightness, shortness of breath and wheezing. Cardiovascular: Positive for chest pain (structural pain left sternum ). Negative for palpitations. Gastrointestinal: Negative for abdominal distention, abdominal pain, constipation, diarrhea, nausea, rectal pain and vomiting.         Hiccups recentkly    Endocrine: Negative for cold intolerance, heat intolerance and polyuria. Genitourinary: Negative for dysuria, frequency, hematuria and urgency. Musculoskeletal: Positive for arthralgias. Negative for back pain, gait problem, joint swelling, myalgias, neck pain and neck stiffness. Skin: Negative for rash and wound. Allergic/Immunologic: Negative for environmental allergies. Neurological: Negative for dizziness, tremors, seizures, syncope, facial asymmetry, speech difficulty, weakness, light-headedness, numbness and headaches. Hematological: Does not bruise/bleed easily. Psychiatric/Behavioral: Negative for agitation, hallucinations and suicidal ideas. The patient is nervous/anxious (and sad depression not suicidal ).         Past Medical History:   Diagnosis Date    Anxiety     Arthritis     Cancer ([facility] [address]     Depression     Eye disorder     Hives     Hyperlipidemia     Hypertension     Measles     Melanocytoma of retina     Osteoporosis     Thyroid disease     Vitamin B12 deficiency (non anemic)     Whooping cough      Past Surgical History:   Procedure Laterality Date    CARPAL TUNNEL RELEASE      CARPAL TUNNEL RELEASE Left     HAND    EYE SURGERY      EYE SURGERY Left     EXTRACTED LEFT EYE - CANCER    JOINT REPLACEMENT      SHOULDER ARTHROPLASTY Right 12/22/2014    REVERSE    SHOULDER ARTHROPLASTY Bilateral     THYROIDECTOMY      TUBAL LIGATION      UTERINE SUSPENSION       Family History   Problem Relation Age of Onset    High Blood Pressure Mother     Other Sister         CEREBRAL INFARCTION    High Cholesterol Sister     High Blood Pressure Sister     High Cholesterol Brother     Heart Failure Brother     Cancer Brother     Diabetes Brother     Other Brother         CHRONIC LUNG DISEASE    Heart Attack Brother     Breast Cancer Niece      Social History     Socioeconomic History    Marital status:      Spouse name: Not on file    Number of children: Not on file    Years of education: Not on file   Katja Highest education level: Not on file   Occupational History    Not on file   Social Needs    Financial resource strain: Not on file    Food insecurity     Worry: Not on file     Inability: Not on file    Transportation needs     Medical: Not on file     Non-medical: Not on file   Tobacco Use    Smoking status: Never Smoker    Smokeless tobacco: Never Used   Substance and Sexual Activity    Alcohol use: No    Drug use: No    Sexual activity: Not on file   Lifestyle    Physical activity     Days per week: Not on file     Minutes per session: Not on file    Stress: Not on file   Relationships    Social connections     Talks on phone: Not on file     Gets together: Not on file     Attends Rastafarian service: Not on file     Active member of club or organization: Not on file     Attends meetings of clubs or organizations: Not on file     Relationship status: Not on file    Intimate partner violence     Fear of current or ex partner: Not on file     Emotionally abused: Not on file     Physically abused: Not on file     Forced sexual activity: Not on file   Other Topics Concern    Not on file   Social History Narrative    Not on file       Patient Active Problem List   Diagnosis    Benign paroxysmal positional vertigo    Dizziness    Vitamin B12 deficiency (non anemic)    Thyroid disease    Osteoporosis    Hypertension    Hyperlipidemia    Cancer ([address]    Anxiety    Arthritis    Shingles rash    Malignant neoplasm of unspecified retina ([address]    Unspecified disorder of eye and adnexa        Vitals:    10/30/20 1339   Pulse: 84   Temp: 97 °F (36.1 °C)   TempSrc: Temporal   SpO2: 96%   Weight: 133 lb 14.4 oz (60.7 kg)   Height: 5' 2\" (1.575 m)       Physical Exam  Vitals signs reviewed. Constitutional:       General: She is not in acute distress. Appearance: She is not ill-appearing. HENT:      Head: Normocephalic.       Right Ear: Tympanic membrane, ear canal and external ear normal. There is no impacted cerumen. Left Ear: Tympanic membrane, ear canal and external ear normal. There is no impacted cerumen. Nose: No rhinorrhea. Mouth/Throat:      Pharynx: No posterior oropharyngeal erythema. Eyes:      General:         Right eye: Discharge (no dischage but eye surounded by shingle ) present. Left eye: No discharge. Extraocular Movements: Extraocular movements intact. Pupils: Pupils are equal, round, and reactive to light. Neck:      Musculoskeletal: Neck supple. Vascular: No carotid bruit. Cardiovascular:      Rate and Rhythm: Regular rhythm. Heart sounds: Normal heart sounds. No murmur. No gallop. Pulmonary:      Breath sounds: Normal breath sounds. No wheezing, rhonchi or rales. Abdominal:      Palpations: Abdomen is soft. There is no mass. Tenderness: There is no abdominal tenderness. There is no guarding or rebound. Hernia: No hernia is present. Musculoskeletal: Normal range of motion. General: Tenderness (left sternum amd nose ) present. Lymphadenopathy:      Cervical: No cervical adenopathy. Skin:     General: Skin is warm and dry. Findings: No bruising or rash. Neurological:      General: No focal deficit present. Mental Status: She is alert and oriented to person, place, and time. Cranial Nerves: No cranial nerve deficit. Motor: No weakness. Coordination: Coordination normal.      Gait: Gait normal.   Psychiatric:         Mood and Affect: Mood normal.         Behavior: Behavior normal.         Assessment and Plan:  Suellen Holt was seen today for results and other.     Diagnoses and all orders for this visit:    Essential hypertension    Benign paroxysmal positional vertigo, unspecified laterality    Age-related osteoporosis without current pathological fracture    Arthritis    Herpes zoster with ophthalmic complication, unspecified herpes zoster eye disease  -     External Referral To Dermatology          Discussions/Education provided to patients during visit:  [] Discussed the importance to stop smoking. [] Advised to monitor eating habits. [] Reviewed and discussed Imaging results. [x] Reviewed and discussed Lab results. [x] Discussed the importance of drinking plenty of fluids. [x] Cut down on Salt, Caffeine, and Sugar. [x] Continue Medications as Discussed. [x] Communicated with patient any concerns, to phone office. Will consult Dr Petra Zelaya   No follow-ups on file.       Seen By:  Tavo Benz DO

## 2020-11-30 ENCOUNTER — OFFICE VISIT (OUTPATIENT)
Dept: FAMILY MEDICINE CLINIC | Age: 80
End: 2020-11-30
Payer: MEDICARE

## 2020-11-30 VITALS
HEIGHT: 62 IN | BODY MASS INDEX: 24.84 KG/M2 | RESPIRATION RATE: 18 BRPM | WEIGHT: 135 LBS | TEMPERATURE: 97.8 F | HEART RATE: 70 BPM | SYSTOLIC BLOOD PRESSURE: 122 MMHG | DIASTOLIC BLOOD PRESSURE: 80 MMHG | OXYGEN SATURATION: 95 %

## 2020-11-30 PROCEDURE — G8400 PT W/DXA NO RESULTS DOC: HCPCS | Performed by: FAMILY MEDICINE

## 2020-11-30 PROCEDURE — G8420 CALC BMI NORM PARAMETERS: HCPCS | Performed by: FAMILY MEDICINE

## 2020-11-30 PROCEDURE — 1090F PRES/ABSN URINE INCON ASSESS: CPT | Performed by: FAMILY MEDICINE

## 2020-11-30 PROCEDURE — G8510 SCR DEP NEG, NO PLAN REQD: HCPCS | Performed by: FAMILY MEDICINE

## 2020-11-30 PROCEDURE — 99213 OFFICE O/P EST LOW 20 MIN: CPT | Performed by: FAMILY MEDICINE

## 2020-11-30 PROCEDURE — 4040F PNEUMOC VAC/ADMIN/RCVD: CPT | Performed by: FAMILY MEDICINE

## 2020-11-30 PROCEDURE — 1036F TOBACCO NON-USER: CPT | Performed by: FAMILY MEDICINE

## 2020-11-30 PROCEDURE — 1123F ACP DISCUSS/DSCN MKR DOCD: CPT | Performed by: FAMILY MEDICINE

## 2020-11-30 PROCEDURE — G8482 FLU IMMUNIZE ORDER/ADMIN: HCPCS | Performed by: FAMILY MEDICINE

## 2020-11-30 PROCEDURE — G8427 DOCREV CUR MEDS BY ELIG CLIN: HCPCS | Performed by: FAMILY MEDICINE

## 2020-11-30 PROCEDURE — 3288F FALL RISK ASSESSMENT DOCD: CPT | Performed by: FAMILY MEDICINE

## 2020-11-30 RX ORDER — LORAZEPAM 0.5 MG/1
0.5 TABLET ORAL 2 TIMES DAILY
COMMUNITY
Start: 2020-11-23 | End: 2020-11-30 | Stop reason: SDUPTHER

## 2020-11-30 RX ORDER — SERTRALINE HYDROCHLORIDE 25 MG/1
25 TABLET, FILM COATED ORAL DAILY
COMMUNITY
Start: 2020-11-23 | End: 2021-01-29 | Stop reason: CLARIF

## 2020-11-30 RX ORDER — LORAZEPAM 0.5 MG/1
0.5 TABLET ORAL 2 TIMES DAILY
Qty: 60 TABLET | Refills: 2 | Status: SHIPPED
Start: 2020-11-30 | End: 2020-12-29 | Stop reason: SDUPTHER

## 2020-11-30 ASSESSMENT — ENCOUNTER SYMPTOMS
WHEEZING: 0
NAUSEA: 0
PHOTOPHOBIA: 0
APNEA: 0
SHORTNESS OF BREATH: 0
ABDOMINAL PAIN: 0
RHINORRHEA: 0
ABDOMINAL DISTENTION: 0
RECTAL PAIN: 0
DIARRHEA: 0
FACIAL SWELLING: 0
VOMITING: 0
SINUS PAIN: 0
VOICE CHANGE: 0
CONSTIPATION: 0
CHEST TIGHTNESS: 0
COUGH: 0
BACK PAIN: 0
SORE THROAT: 0

## 2020-11-30 ASSESSMENT — PATIENT HEALTH QUESTIONNAIRE - PHQ9
SUM OF ALL RESPONSES TO PHQ9 QUESTIONS 1 & 2: 0
SUM OF ALL RESPONSES TO PHQ QUESTIONS 1-9: 0
SUM OF ALL RESPONSES TO PHQ QUESTIONS 1-9: 0
1. LITTLE INTEREST OR PLEASURE IN DOING THINGS: 0
SUM OF ALL RESPONSES TO PHQ QUESTIONS 1-9: 0
2. FEELING DOWN, DEPRESSED OR HOPELESS: 0

## 2020-11-30 NOTE — PROGRESS NOTES
20  Jennifer Diaz : 1940 Sex: female  Age: 79 y.o. Chief Complaint   Patient presents with    Check-Up     some slight dizziness at times , check u[p on her skin problem between eyes is better was tested no ancer and not shingles. by kendal baez       Overall check up  Seeing Dr Say Palm for skin condition           Current Outpatient Medications:     sertraline (ZOLOFT) 25 MG tablet, Take 25 mg by mouth daily, Disp: , Rfl:     LORazepam (ATIVAN) 0.5 MG tablet, Take 1 tablet by mouth 2 times daily for 30 days. , Disp: 60 tablet, Rfl: 2    vitamin B-12 (CYANOCOBALAMIN) 1000 MCG tablet, Take 1,000 mcg by mouth daily, Disp: , Rfl:     ketotifen (ALAWAY) 0.025 % ophthalmic solution, Place 1 drop into the right eye daily, Disp: , Rfl:     polyethyl glycol-propyl glycol 0.4-0.3 % (SYSTANE) 0.4-0.3 % ophthalmic solution, Place 1 drop into both eyes daily, Disp: , Rfl:   Allergies   Allergen Reactions    Adhesive Tape     Pravachol [Pravastatin]     Toprol Xl [Metoprolol]     Zithromax [Azithromycin]        Review of Systems   Constitutional: Negative for appetite change, chills, diaphoresis, fatigue, fever and unexpected weight change. HENT: Negative for congestion, dental problem, ear discharge, ear pain, facial swelling, hearing loss, mouth sores, rhinorrhea, sinus pain, sore throat, tinnitus and voice change. Eyes: Positive for visual disturbance (eye  removed in past ). Negative for photophobia. Respiratory: Negative for apnea, cough, chest tightness, shortness of breath and wheezing. Cardiovascular: Positive for chest pain (left lower rib). Negative for palpitations. Gastrointestinal: Negative for abdominal distention, abdominal pain, constipation, diarrhea, nausea, rectal pain and vomiting. Endocrine: Negative for cold intolerance, heat intolerance and polyuria. Genitourinary: Negative for dysuria, frequency, hematuria and urgency. Musculoskeletal: Positive for arthralgias. Negative for back pain, gait problem, joint swelling, myalgias, neck pain and neck stiffness. Skin: Positive for rash (eye left). Negative for wound. Allergic/Immunologic: Negative for environmental allergies. Neurological: Positive for weakness. Negative for dizziness, tremors, seizures, syncope, facial asymmetry, speech difficulty, light-headedness, numbness and headaches. Hematological: Does not bruise/bleed easily. Psychiatric/Behavioral: Negative for agitation, hallucinations and suicidal ideas. The patient is nervous/anxious.         Past Medical History:   Diagnosis Date    Anxiety     Arthritis     Cancer ([facility] [address]     Depression     Eye disorder     Hives     Hyperlipidemia     Hypertension     Measles     Melanocytoma of retina     Osteoporosis     Thyroid disease     Vitamin B12 deficiency (non anemic)     Whooping cough      Past Surgical History:   Procedure Laterality Date    CARPAL TUNNEL RELEASE      CARPAL TUNNEL RELEASE Left     HAND    EYE SURGERY      EYE SURGERY Left     EXTRACTED LEFT EYE - CANCER    JOINT REPLACEMENT      SHOULDER ARTHROPLASTY Right 12/22/2014    REVERSE    SHOULDER ARTHROPLASTY Bilateral     THYROIDECTOMY      TUBAL LIGATION      UTERINE SUSPENSION       Family History   Problem Relation Age of Onset    High Blood Pressure Mother     Other Sister         CEREBRAL INFARCTION    High Cholesterol Sister     High Blood Pressure Sister     High Cholesterol Brother     Heart Failure Brother     Cancer Brother     Diabetes Brother     Other Brother         CHRONIC LUNG DISEASE    Heart Attack Brother     Breast Cancer Niece      Social History     Socioeconomic History    Marital status:      Spouse name: Not on file    Number of children: Not on file    Years of education: Not on file    Highest education level: Not on file   Occupational History    Not on file   Social Needs    Financial resource strain: Not on file   Elaina-Ruthie insecurity     Worry: Not on file     Inability: Not on file    Transportation needs     Medical: Not on file     Non-medical: Not on file   Tobacco Use    Smoking status: Never Smoker    Smokeless tobacco: Never Used   Substance and Sexual Activity    Alcohol use: No    Drug use: No    Sexual activity: Not Currently     Partners: Male   Lifestyle    Physical activity     Days per week: Not on file     Minutes per session: Not on file    Stress: Not on file   Relationships    Social connections     Talks on phone: Not on file     Gets together: Not on file     Attends [employer] service: Not on file     Active member of club or organization: Not on file     Attends meetings of clubs or organizations: Not on file     Relationship status: Not on file    Intimate partner violence     Fear of current or ex partner: Not on file     Emotionally abused: Not on file     Physically abused: Not on file     Forced sexual activity: Not on file   Other Topics Concern    Not on file   Social History Narrative    Not on file       Patient Active Problem List   Diagnosis    Benign paroxysmal positional vertigo    Dizziness    Vitamin B12 deficiency (non anemic)    Thyroid disease    Osteoporosis    Hypertension    Hyperlipidemia    Cancer ([address]    Anxiety    Arthritis    Shingles rash    Malignant neoplasm of unspecified retina ([facility] [address]    Unspecified disorder of eye and adnexa        Vitals:    11/30/20 1347   BP: 122/80   Pulse: 70   Resp: 18   Temp: 97.8 °F (36.6 °C)   TempSrc: Temporal   SpO2: 95%   Weight: 135 lb (61.2 kg)   Height: 5' 2\" (1.575 m)       Physical Exam  Vitals signs reviewed. Constitutional:       General: She is not in acute distress. Appearance: She is not ill-appearing. HENT:      Head: Normocephalic. Right Ear: Tympanic membrane, ear canal and external ear normal. There is no impacted cerumen.       Left Ear: Tympanic membrane, ear canal and external ear normal. There is no impacted cerumen. Nose: No rhinorrhea. Mouth/Throat:      Pharynx: No posterior oropharyngeal erythema. Eyes:      General:         Right eye: No discharge. Left eye: No discharge. Extraocular Movements: Extraocular movements intact. Pupils: Pupils are equal, round, and reactive to light. Neck:      Musculoskeletal: Neck supple. Muscular tenderness present. Vascular: No carotid bruit. Cardiovascular:      Rate and Rhythm: Regular rhythm. Heart sounds: Normal heart sounds. No murmur. No gallop. Pulmonary:      Breath sounds: Normal breath sounds. No wheezing, rhonchi or rales. Abdominal:      Palpations: Abdomen is soft. There is no mass. Tenderness: There is no abdominal tenderness. There is no guarding or rebound. Hernia: No hernia is present. Musculoskeletal: Normal range of motion. General: Tenderness (left ribs pain ) present. Lymphadenopathy:      Cervical: No cervical adenopathy. Skin:     General: Skin is warm and dry. Findings: No bruising or rash. Neurological:      General: No focal deficit present. Mental Status: She is alert and oriented to person, place, and time. Cranial Nerves: No cranial nerve deficit. Motor: No weakness. Coordination: Coordination normal.      Gait: Gait normal.   Psychiatric:         Mood and Affect: Mood normal.         Behavior: Behavior normal.         Assessment and Plan:  Ethelyn Klinefelter was seen today for check-up. Diagnoses and all orders for this visit:    Essential hypertension  -     LORazepam (ATIVAN) 0.5 MG tablet; Take 1 tablet by mouth 2 times daily for 30 days. Thyroid disease  -     LORazepam (ATIVAN) 0.5 MG tablet; Take 1 tablet by mouth 2 times daily for 30 days. Benign paroxysmal positional vertigo, unspecified laterality    Age-related osteoporosis without current pathological fracture    Arthritis  -     LORazepam (ATIVAN) 0.5 MG tablet;  Take 1 tablet by mouth 2 times daily for 30 days. Herpes zoster with ophthalmic complication, unspecified herpes zoster eye disease    Mixed hyperlipidemia  -     LORazepam (ATIVAN) 0.5 MG tablet; Take 1 tablet by mouth 2 times daily for 30 days. Discussions/Education provided to patients during visit:  [] Discussed the importance to stop smoking. [] Advised to monitor eating habits. [] Reviewed and discussed Imaging results. [x] Reviewed and discussed Lab results. [x] Discussed the importance of drinking plenty of fluids. [x] Cut down on Salt, Caffeine, and Sugar. [x] Continue Medications as Discussed. [x] Communicated with patient any concerns, to phone office. Continues to do heavy  physical work at her house   discused  problems   Return in about 2 months (around 1/30/2021).       Seen By:  Isa Martínez, DO

## 2020-12-03 ENCOUNTER — TELEPHONE (OUTPATIENT)
Dept: FAMILY MEDICINE CLINIC | Age: 80
End: 2020-12-03

## 2020-12-03 NOTE — TELEPHONE ENCOUNTER
Per patient she can go any day but wants morning appointment. Informed Patient Bernice  will call her with appt date and time.  PLEASE PUT EXTERNAL ORDER IN [facility] FOR CT CHEST NO CONTRAST . DX; R98.1.

## 2020-12-07 ENCOUNTER — TELEPHONE (OUTPATIENT)
Dept: FAMILY MEDICINE CLINIC | Age: 80
End: 2020-12-07

## 2020-12-07 NOTE — TELEPHONE ENCOUNTER
Per patient she received a call from THE [facility] office stating she was exposed to covid at her office she wants to know what to do. I called her back and informed her if no symptoms can go to [facility] for test. She was worked up so she put her son on the phone and I explained it to him and informed him of the symptoms fever, cough, lack of taste/smell. He stated she states she has ad a cough for 6 months and no other symptoms but he hasn't taken her temperature yet so he doesn't know which way to go because he does not want to drive to [place] [facility] and she has a temperature and they ask her if she has a cough and then they don't do the test he will be mad but on the other hand if the cough is just her usual cough he does not want to take her to a flu clinic and be exposed to all the flu and covid. He is going to talk to her and try and see exactly what is wrong and will call the office back if wants to put order in for [facility]. I spoke to the son for a total of 15 minutes.

## 2020-12-07 NOTE — TELEPHONE ENCOUNTER
I called Alma Rosa Dang to let her know I scheduled her CT chest w/o contrast at [facility] on 12/16/2020 at 9:30. She is questioning if it is going to show the area that is hurting the most, which is under the left breast from the belly button over. She wants to make sure this type of CT will see that whole area or does she need an abdomen one also? Told her I would ask you.

## 2020-12-21 ENCOUNTER — TELEPHONE (OUTPATIENT)
Dept: FAMILY MEDICINE CLINIC | Age: 80
End: 2020-12-21

## 2020-12-29 RX ORDER — LORAZEPAM 0.5 MG/1
0.5 TABLET ORAL 2 TIMES DAILY
Qty: 60 TABLET | Refills: 2 | Status: SHIPPED | OUTPATIENT
Start: 2020-12-29 | End: 2021-01-28

## 2021-01-29 ENCOUNTER — OFFICE VISIT (OUTPATIENT)
Dept: FAMILY MEDICINE CLINIC | Age: 81
End: 2021-01-29
Payer: MEDICARE

## 2021-01-29 VITALS
WEIGHT: 136.7 LBS | BODY MASS INDEX: 25.16 KG/M2 | DIASTOLIC BLOOD PRESSURE: 70 MMHG | SYSTOLIC BLOOD PRESSURE: 130 MMHG | HEIGHT: 62 IN | TEMPERATURE: 97.6 F | RESPIRATION RATE: 17 BRPM | OXYGEN SATURATION: 98 % | HEART RATE: 72 BPM

## 2021-01-29 DIAGNOSIS — E07.9 THYROID DISEASE: ICD-10-CM

## 2021-01-29 DIAGNOSIS — C80.1 CANCER (HCC): ICD-10-CM

## 2021-01-29 DIAGNOSIS — I10 ESSENTIAL HYPERTENSION: Primary | ICD-10-CM

## 2021-01-29 DIAGNOSIS — C69.20 MALIGNANT NEOPLASM OF RETINA, UNSPECIFIED LATERALITY (HCC): ICD-10-CM

## 2021-01-29 DIAGNOSIS — R42 DIZZINESS: ICD-10-CM

## 2021-01-29 PROCEDURE — 1123F ACP DISCUSS/DSCN MKR DOCD: CPT | Performed by: FAMILY MEDICINE

## 2021-01-29 PROCEDURE — 99213 OFFICE O/P EST LOW 20 MIN: CPT | Performed by: FAMILY MEDICINE

## 2021-01-29 PROCEDURE — G8427 DOCREV CUR MEDS BY ELIG CLIN: HCPCS | Performed by: FAMILY MEDICINE

## 2021-01-29 PROCEDURE — 1090F PRES/ABSN URINE INCON ASSESS: CPT | Performed by: FAMILY MEDICINE

## 2021-01-29 PROCEDURE — 4040F PNEUMOC VAC/ADMIN/RCVD: CPT | Performed by: FAMILY MEDICINE

## 2021-01-29 PROCEDURE — G8400 PT W/DXA NO RESULTS DOC: HCPCS | Performed by: FAMILY MEDICINE

## 2021-01-29 PROCEDURE — 1036F TOBACCO NON-USER: CPT | Performed by: FAMILY MEDICINE

## 2021-01-29 PROCEDURE — G8417 CALC BMI ABV UP PARAM F/U: HCPCS | Performed by: FAMILY MEDICINE

## 2021-01-29 PROCEDURE — G8482 FLU IMMUNIZE ORDER/ADMIN: HCPCS | Performed by: FAMILY MEDICINE

## 2021-01-29 RX ORDER — LORAZEPAM 0.5 MG/1
0.5 TABLET ORAL 2 TIMES DAILY
COMMUNITY
End: 2021-03-26 | Stop reason: CLARIF

## 2021-01-29 ASSESSMENT — PATIENT HEALTH QUESTIONNAIRE - PHQ9
SUM OF ALL RESPONSES TO PHQ QUESTIONS 1-9: 2
SUM OF ALL RESPONSES TO PHQ QUESTIONS 1-9: 2

## 2021-01-29 ASSESSMENT — ENCOUNTER SYMPTOMS
CONSTIPATION: 0
VOICE CHANGE: 0
WHEEZING: 0
APNEA: 0
FACIAL SWELLING: 0
COUGH: 0
SINUS PAIN: 0
SHORTNESS OF BREATH: 0
DIARRHEA: 0
SORE THROAT: 0
BACK PAIN: 0
NAUSEA: 0
ABDOMINAL DISTENTION: 0
PHOTOPHOBIA: 0
ABDOMINAL PAIN: 0
VOMITING: 0
CHEST TIGHTNESS: 0
RHINORRHEA: 0
RECTAL PAIN: 0

## 2021-01-29 NOTE — PROGRESS NOTES
21  Trudy Salazar : 1940 Sex: female  Age: 79 y.o. Chief Complaint   Patient presents with    Hypertension    Dizziness     off and on when bends over and raises up for 1 month    Otalgia     left ear putting sweet oil in the ear       [place] and had contact with virus and in a doctors office   No symptoms   Also had gynecological problems seeing  Dr Urbano Macedo           Current Outpatient Medications:     LORazepam (ATIVAN) 0.5 MG tablet, Take 0.5 mg by mouth 2 times daily. , Disp: , Rfl:     ketotifen (ALAWAY) 0.025 % ophthalmic solution, Place 1 drop into the right eye daily, Disp: , Rfl:   Allergies   Allergen Reactions    Adhesive Tape     Pravachol [Pravastatin]     Toprol Xl [Metoprolol]     Zithromax [Azithromycin]        Review of Systems   Constitutional: Negative for appetite change, chills, diaphoresis, fatigue, fever and unexpected weight change. HENT: Negative for congestion, dental problem, ear discharge, ear pain, facial swelling, hearing loss, mouth sores, rhinorrhea, sinus pain, sore throat, tinnitus and voice change. Eyes: Positive for visual disturbance (no visual changesremoval of eye ). Negative for photophobia. Respiratory: Negative for apnea, cough, chest tightness, shortness of breath and wheezing. Cardiovascular: Negative for chest pain and palpitations. Gastrointestinal: Negative for abdominal distention, abdominal pain, constipation, diarrhea, nausea, rectal pain and vomiting. Endocrine: Negative for cold intolerance, heat intolerance and polyuria. Genitourinary: Positive for pelvic pain. Negative for dysuria, frequency, hematuria and urgency. Musculoskeletal: Negative for arthralgias, back pain, gait problem, joint swelling, myalgias, neck pain and neck stiffness. Skin: Negative for rash and wound. Allergic/Immunologic: Negative for environmental allergies. Neurological: Negative for dizziness, tremors, seizures, syncope, facial asymmetry, speech difficulty, weakness, light-headedness, numbness and headaches. Hematological: Does not bruise/bleed easily. Psychiatric/Behavioral: Negative for agitation, hallucinations and suicidal ideas. The patient is not nervous/anxious.          Depression not suicidal and this is longstanding tried multiple meds and could not take them pschys herself out every vist       Past Medical History:   Diagnosis Date    Anxiety     Arthritis     Cancer [facility] [address]     Depression     Eye disorder     Hives     Hyperlipidemia     Hypertension     Measles     Melanocytoma of retina     Osteoporosis     Thyroid disease     Vitamin B12 deficiency (non anemic)     Whooping cough      Past Surgical History:   Procedure Laterality Date    CARPAL TUNNEL RELEASE      CARPAL TUNNEL RELEASE Left     HAND    EYE SURGERY      EYE SURGERY Left     EXTRACTED LEFT EYE - CANCER    JOINT REPLACEMENT      SHOULDER ARTHROPLASTY Right 12/22/2014    REVERSE    SHOULDER ARTHROPLASTY Bilateral     THYROIDECTOMY      TUBAL LIGATION      UTERINE SUSPENSION       Family History   Problem Relation Age of Onset    High Blood Pressure Mother     Other Sister         CEREBRAL INFARCTION    High Cholesterol Sister     High Blood Pressure Sister     High Cholesterol Brother     Heart Failure Brother     Cancer Brother     Diabetes Brother     Other Brother         CHRONIC LUNG DISEASE    Heart Attack Brother     Breast Cancer Niece      Social History     Socioeconomic History    Marital status:      Spouse name: Not on file    Number of children: Not on file    Years of education: Not on file    Highest education level: Not on file   Occupational History    Not on file   Social Needs    Financial resource strain: Not on file    Food insecurity     Worry: Not on file     Inability: Not on file   [employer] needs Medical: Not on file     Non-medical: Not on file   Tobacco Use    Smoking status: Never Smoker    Smokeless tobacco: Never Used   Substance and Sexual Activity    Alcohol use: No    Drug use: No    Sexual activity: Not Currently     Partners: Male   Lifestyle    Physical activity     Days per week: Not on file     Minutes per session: Not on file    Stress: Not on file   Relationships    Social connections     Talks on phone: Not on file     Gets together: Not on file     Attends Scientologist service: Not on file     Active member of club or organization: Not on file     Attends meetings of clubs or organizations: Not on file     Relationship status: Not on file    Intimate partner violence     Fear of current or ex partner: Not on file     Emotionally abused: Not on file     Physically abused: Not on file     Forced sexual activity: Not on file   Other Topics Concern    Not on file   Social History Narrative    Not on file       Patient Active Problem List   Diagnosis    Benign paroxysmal positional vertigo    Dizziness    Vitamin B12 deficiency (non anemic)    Thyroid disease    Osteoporosis    Hypertension    Hyperlipidemia    Cancer ([facility] [address]    Anxiety    Arthritis    Shingles rash    Malignant neoplasm of unspecified retina ([facility] [address]    Unspecified disorder of eye and adnexa        Vitals:    01/29/21 1113   BP: 130/70   Pulse: 72   Resp: 17   Temp: 97.6 °F (36.4 °C)   TempSrc: Temporal   SpO2: 98%   Weight: 136 lb 11.2 oz (62 kg)   Height: 5' 2\" (1.575 m)       Physical Exam  Vitals signs reviewed. Constitutional:       General: She is not in acute distress. Appearance: She is not ill-appearing. HENT:      Head: Normocephalic. Right Ear: Tympanic membrane, ear canal and external ear normal. There is no impacted cerumen. Left Ear: Tympanic membrane, ear canal and external ear normal. There is no impacted cerumen. Nose: No rhinorrhea.       Mouth/Throat: Pharynx: No posterior oropharyngeal erythema. Eyes:      General:         Right eye: No discharge. Left eye: No discharge. Extraocular Movements: Extraocular movements intact. Pupils: Pupils are equal, round, and reactive to light. Neck:      Musculoskeletal: Neck supple. Vascular: No carotid bruit. Cardiovascular:      Rate and Rhythm: Regular rhythm. Heart sounds: Normal heart sounds. No murmur. No gallop. Pulmonary:      Breath sounds: Normal breath sounds. No wheezing, rhonchi or rales. Abdominal:      General: There is distension. Palpations: Abdomen is soft. There is no mass. Tenderness: There is abdominal tenderness (lower abdomen ). There is no guarding or rebound. Hernia: No hernia is present. Musculoskeletal: Normal range of motion. Lymphadenopathy:      Cervical: No cervical adenopathy. Skin:     General: Skin is warm and dry. Findings: No bruising or rash. Neurological:      General: No focal deficit present. Mental Status: She is alert and oriented to person, place, and time. Cranial Nerves: No cranial nerve deficit. Motor: No weakness. Coordination: Coordination normal.      Gait: Gait normal.   Psychiatric:         Mood and Affect: Mood normal.         Behavior: Behavior normal.         Assessment and Plan:  Curly Halsted was seen today for hypertension, dizziness and otalgia. Diagnoses and all orders for this visit:    Essential hypertension    Thyroid disease    Cancer ([facility] [address]    Malignant neoplasm of retina, unspecified laterality ([facility] [address]    Dizziness          Discussions/Education provided to patients during visit:  [] Discussed the importance to stop smoking. [] Advised to monitor eating habits. [] Reviewed and discussed Imaging results. [] Reviewed and discussed Lab results. [] Discussed the importance of drinking plenty of fluids. [] Cut down on Salt, Caffeine, and Sugar. [x] Continue Medications as Discussed. [x] Communicated with patient any concerns, to phone office. Reviewed labs   conseling patient always cries and has a emotional visit . She is depressed over life situation not anything we can change ,conseled       Return in about 1 month (around 2/28/2021).       Seen By:  Jayla Reyes, DO

## 2021-02-16 ENCOUNTER — HOSPITAL ENCOUNTER (OUTPATIENT)
Age: 81
Discharge: HOME | End: 2021-02-18

## 2021-02-16 PROCEDURE — 88305 TISSUE EXAM BY PATHOLOGIST: CPT

## 2021-02-26 ENCOUNTER — OFFICE VISIT (OUTPATIENT)
Dept: FAMILY MEDICINE CLINIC | Age: 81
End: 2021-02-26
Payer: MEDICARE

## 2021-02-26 VITALS
OXYGEN SATURATION: 97 % | HEART RATE: 78 BPM | BODY MASS INDEX: 24.48 KG/M2 | DIASTOLIC BLOOD PRESSURE: 75 MMHG | HEIGHT: 62 IN | SYSTOLIC BLOOD PRESSURE: 122 MMHG | RESPIRATION RATE: 20 BRPM | TEMPERATURE: 97 F | WEIGHT: 133 LBS

## 2021-02-26 DIAGNOSIS — I10 ESSENTIAL HYPERTENSION: Primary | ICD-10-CM

## 2021-02-26 DIAGNOSIS — E07.9 THYROID DISEASE: ICD-10-CM

## 2021-02-26 DIAGNOSIS — C80.1 CANCER (HCC): ICD-10-CM

## 2021-02-26 DIAGNOSIS — E53.8 B12 DEFICIENCY: ICD-10-CM

## 2021-02-26 DIAGNOSIS — M81.0 AGE-RELATED OSTEOPOROSIS WITHOUT CURRENT PATHOLOGICAL FRACTURE: ICD-10-CM

## 2021-02-26 DIAGNOSIS — E78.2 MIXED HYPERLIPIDEMIA: ICD-10-CM

## 2021-02-26 DIAGNOSIS — H81.10 BENIGN PAROXYSMAL POSITIONAL VERTIGO, UNSPECIFIED LATERALITY: ICD-10-CM

## 2021-02-26 PROCEDURE — G8482 FLU IMMUNIZE ORDER/ADMIN: HCPCS | Performed by: FAMILY MEDICINE

## 2021-02-26 PROCEDURE — 99214 OFFICE O/P EST MOD 30 MIN: CPT | Performed by: FAMILY MEDICINE

## 2021-02-26 PROCEDURE — 1036F TOBACCO NON-USER: CPT | Performed by: FAMILY MEDICINE

## 2021-02-26 PROCEDURE — 1090F PRES/ABSN URINE INCON ASSESS: CPT | Performed by: FAMILY MEDICINE

## 2021-02-26 PROCEDURE — 1123F ACP DISCUSS/DSCN MKR DOCD: CPT | Performed by: FAMILY MEDICINE

## 2021-02-26 PROCEDURE — G8420 CALC BMI NORM PARAMETERS: HCPCS | Performed by: FAMILY MEDICINE

## 2021-02-26 PROCEDURE — 4040F PNEUMOC VAC/ADMIN/RCVD: CPT | Performed by: FAMILY MEDICINE

## 2021-02-26 PROCEDURE — G8427 DOCREV CUR MEDS BY ELIG CLIN: HCPCS | Performed by: FAMILY MEDICINE

## 2021-02-26 PROCEDURE — G8400 PT W/DXA NO RESULTS DOC: HCPCS | Performed by: FAMILY MEDICINE

## 2021-02-26 RX ORDER — SERTRALINE HYDROCHLORIDE 25 MG/1
25 TABLET, FILM COATED ORAL DAILY
Qty: 30 TABLET | Refills: 5 | Status: SHIPPED
Start: 2021-02-26 | End: 2021-03-26 | Stop reason: CLARIF

## 2021-02-26 ASSESSMENT — PATIENT HEALTH QUESTIONNAIRE - PHQ9
SUM OF ALL RESPONSES TO PHQ QUESTIONS 1-9: 2
1. LITTLE INTEREST OR PLEASURE IN DOING THINGS: 1
SUM OF ALL RESPONSES TO PHQ QUESTIONS 1-9: 2
SUM OF ALL RESPONSES TO PHQ9 QUESTIONS 1 & 2: 2

## 2021-02-26 ASSESSMENT — ENCOUNTER SYMPTOMS
SINUS PAIN: 0
SHORTNESS OF BREATH: 0
CONSTIPATION: 0
WHEEZING: 0
RECTAL PAIN: 0
ABDOMINAL DISTENTION: 0
RHINORRHEA: 0
BACK PAIN: 1
NAUSEA: 0
FACIAL SWELLING: 0
VOICE CHANGE: 0
SORE THROAT: 0
PHOTOPHOBIA: 0
APNEA: 0
DIARRHEA: 0
ABDOMINAL PAIN: 1
CHEST TIGHTNESS: 0
COUGH: 0
VOMITING: 0

## 2021-02-26 NOTE — PROGRESS NOTES
21  Fabby Delgado : 1940 Sex: female  Age: 79 y.o. Chief Complaint   Patient presents with    1 Month Follow-Up     check up ,patient had a d&c last week had pulps removed everything was good    Depression     for 2 weeks has been very irrtable, angry outburts with depression, Patient wants  [address] UP           Current Outpatient Medications:     LORazepam (ATIVAN) 0.5 MG tablet, Take 0.5 mg by mouth 2 times daily. , Disp: , Rfl:     ketotifen (ALAWAY) 0.025 % ophthalmic solution, Place 1 drop into the right eye daily, Disp: , Rfl:   Allergies   Allergen Reactions    Adhesive Tape     Pravachol [Pravastatin]     Toprol Xl [Metoprolol]     Zithromax [Azithromycin]        Review of Systems   Constitutional: Negative for appetite change, chills, diaphoresis, fatigue, fever and unexpected weight change. HENT: Negative for congestion, dental problem, ear discharge, ear pain, facial swelling, hearing loss, mouth sores, rhinorrhea, sinus pain, sore throat, tinnitus and voice change. Eyes: Negative for photophobia and visual disturbance (no visual changes). Respiratory: Negative for apnea, cough, chest tightness, shortness of breath and wheezing. Cardiovascular: Negative for chest pain and palpitations. Gastrointestinal: Positive for abdominal pain. Negative for abdominal distention, constipation, diarrhea, nausea, rectal pain and vomiting. Endocrine: Negative for cold intolerance, heat intolerance and polyuria. Genitourinary: Negative for dysuria, frequency, hematuria and urgency. Musculoskeletal: Positive for arthralgias and back pain. Negative for gait problem, joint swelling, myalgias, neck pain and neck stiffness. Skin: Negative for rash and wound. Allergic/Immunologic: Negative for environmental allergies.    Neurological: Negative for dizziness, tremors, seizures, syncope, facial asymmetry, speech difficulty, weakness, light-headedness, numbness and headaches. Hematological: Does not bruise/bleed easily. Psychiatric/Behavioral: Negative for agitation, hallucinations and suicidal ideas. The patient is nervous/anxious (AND DEPRESSED REFUSES MED CHANGES ).         Past Medical History:   Diagnosis Date    Anxiety     Arthritis     Cancer ([address]     Depression     Eye disorder     Hives     Hyperlipidemia     Hypertension     Measles     Melanocytoma of retina     Osteoporosis     Thyroid disease     Vitamin B12 deficiency (non anemic)     Whooping cough      Past Surgical History:   Procedure Laterality Date    CARPAL TUNNEL RELEASE      CARPAL TUNNEL RELEASE Left     HAND    DILATION AND CURETTAGE OF UTERUS      EYE SURGERY      EYE SURGERY Left     EXTRACTED LEFT EYE - CANCER    JOINT REPLACEMENT      SHOULDER ARTHROPLASTY Right 12/22/2014    REVERSE    SHOULDER ARTHROPLASTY Bilateral     THYROIDECTOMY      TUBAL LIGATION      UTERINE SUSPENSION       Family History   Problem Relation Age of Onset    High Blood Pressure Mother     Other Sister         CEREBRAL INFARCTION    High Cholesterol Sister     High Blood Pressure Sister     High Cholesterol Brother     Heart Failure Brother     Cancer Brother     Diabetes Brother     Other Brother         CHRONIC LUNG DISEASE    Heart Attack Brother     Breast Cancer Niece      Social History     Socioeconomic History    Marital status:      Spouse name: Not on file    Number of children: Not on file    Years of education: Not on file    Highest education level: Not on file   Occupational History    Not on file   Social Needs    Financial resource strain: Not on file    Food insecurity     Worry: Not on file     Inability: Not on file    Transportation needs     Medical: Not on file     Non-medical: Not on file   Tobacco Use    Smoking status: Never Smoker    Smokeless tobacco: Never Used   Substance and Sexual Activity    Alcohol use: No    Drug use: No    Sexual activity: Not Currently     Partners: Male   Lifestyle    Physical activity     Days per week: Not on file     Minutes per session: Not on file    Stress: Not on file   Relationships    Social connections     Talks on phone: Not on file     Gets together: Not on file     Attends Bahai service: Not on file     Active member of club or organization: Not on file     Attends meetings of clubs or organizations: Not on file     Relationship status: Not on file    Intimate partner violence     Fear of current or ex partner: Not on file     Emotionally abused: Not on file     Physically abused: Not on file     Forced sexual activity: Not on file   Other Topics Concern    Not on file   Social History Narrative    Not on file       Patient Active Problem List   Diagnosis    Benign paroxysmal positional vertigo    Dizziness    Vitamin B12 deficiency (non anemic)    Thyroid disease    Osteoporosis    Hypertension    Hyperlipidemia    Cancer ([address]    Anxiety    Arthritis    Shingles rash    Malignant neoplasm of unspecified retina ([facility] [address]    Unspecified disorder of eye and adnexa        Vitals:    02/26/21 1015   BP: 122/75   Pulse: 78   Resp: 20   Temp: 97 °F (36.1 °C)   TempSrc: Temporal   SpO2: 97%   Weight: 133 lb (60.3 kg)   Height: 5' 2\" (1.575 m)       Physical Exam  Vitals signs reviewed. Constitutional:       General: She is not in acute distress. Appearance: She is not ill-appearing. HENT:      Head: Normocephalic. Right Ear: Tympanic membrane, ear canal and external ear normal. There is no impacted cerumen. Left Ear: Tympanic membrane, ear canal and external ear normal. There is no impacted cerumen. Nose: No rhinorrhea. Mouth/Throat:      Pharynx: No posterior oropharyngeal erythema. Eyes:      General:         Right eye: No discharge. Left eye: No discharge. Extraocular Movements: Extraocular movements intact.       Pupils: Pupils are equal, round, and reactive to light. Comments: ONE EYE    Neck:      Musculoskeletal: Neck supple. Vascular: No carotid bruit. Cardiovascular:      Rate and Rhythm: Regular rhythm. Heart sounds: Normal heart sounds. No murmur. No gallop. Pulmonary:      Breath sounds: Normal breath sounds. No wheezing, rhonchi or rales. Abdominal:      Palpations: Abdomen is soft. There is no mass. Tenderness: There is no abdominal tenderness. There is no guarding or rebound. Hernia: No hernia is present. Musculoskeletal: Normal range of motion. Lymphadenopathy:      Cervical: No cervical adenopathy. Skin:     General: Skin is warm and dry. Findings: No bruising or rash. Neurological:      General: No focal deficit present. Mental Status: She is alert and oriented to person, place, and time. Cranial Nerves: No cranial nerve deficit. Motor: No weakness. Coordination: Coordination normal.      Gait: Gait normal.   Psychiatric:         Mood and Affect: Mood normal.         Behavior: Behavior normal.         Assessment and Plan:  Sandee Contreras was seen today for 1 month follow-up and depression. Diagnoses and all orders for this visit:    Essential hypertension    Mixed hyperlipidemia    Cancer ([facility] [address]    Benign paroxysmal positional vertigo, unspecified laterality    Age-related osteoporosis without current pathological fracture    Thyroid disease          Discussions/Education provided to patients during visit:  [] Discussed the importance to stop smoking. [] Advised to monitor eating habits. [] Reviewed and discussed Imaging results. [] Reviewed and discussed Lab results. [x] Discussed the importance of drinking plenty of fluids. [x] Cut down on Salt, Caffeine, and Sugar. [x] Continue Medications as Discussed. [x] Communicated with patient any concerns, to phone office.   HAS A LOT OF DEPRESSION AND NOT SUICIDAL THINKING DEAD PEOPLE AND   HAVING A LOT OF EMOTIONS AND ANXIETY . TRY ZOLOFT AGAIN PT STOPPED ON HER OWN   LABS SOON   CONTINUE ATIVAN AND ADD ZOLOFT  CONSELLED FOR 30 MIN      No follow-ups on file.       Seen By:  John Castanon, DO

## 2021-03-05 ENCOUNTER — NURSE ONLY (OUTPATIENT)
Dept: FAMILY MEDICINE CLINIC | Age: 81
End: 2021-03-05
Payer: MEDICARE

## 2021-03-05 VITALS — TEMPERATURE: 97.6 F

## 2021-03-05 DIAGNOSIS — E07.9 THYROID DISEASE: ICD-10-CM

## 2021-03-05 DIAGNOSIS — I10 ESSENTIAL HYPERTENSION: ICD-10-CM

## 2021-03-05 DIAGNOSIS — F41.9 ANXIETY: ICD-10-CM

## 2021-03-05 DIAGNOSIS — E78.2 MIXED HYPERLIPIDEMIA: ICD-10-CM

## 2021-03-05 DIAGNOSIS — E53.8 B12 DEFICIENCY: ICD-10-CM

## 2021-03-05 DIAGNOSIS — C80.1 CANCER (HCC): ICD-10-CM

## 2021-03-05 LAB
ALBUMIN SERPL-MCNC: 4.3 G/DL (ref 3.5–5.2)
ALP BLD-CCNC: 58 U/L (ref 35–104)
ALT SERPL-CCNC: 10 U/L (ref 0–32)
ANION GAP SERPL CALCULATED.3IONS-SCNC: 13 MMOL/L (ref 7–16)
AST SERPL-CCNC: 17 U/L (ref 0–31)
BASOPHILS ABSOLUTE: 0.06 E9/L (ref 0–0.2)
BASOPHILS RELATIVE PERCENT: 1.2 % (ref 0–2)
BILIRUB SERPL-MCNC: 0.6 MG/DL (ref 0–1.2)
BUN BLDV-MCNC: 13 MG/DL (ref 8–23)
CALCIUM SERPL-MCNC: 9.5 MG/DL (ref 8.6–10.2)
CHLORIDE BLD-SCNC: 106 MMOL/L (ref 98–107)
CO2: 25 MMOL/L (ref 22–29)
CREAT SERPL-MCNC: 0.7 MG/DL (ref 0.5–1)
EOSINOPHILS ABSOLUTE: 0.05 E9/L (ref 0.05–0.5)
EOSINOPHILS RELATIVE PERCENT: 1 % (ref 0–6)
FOLATE: 13.9 NG/ML (ref 4.8–24.2)
GFR AFRICAN AMERICAN: >60
GFR NON-AFRICAN AMERICAN: >60 ML/MIN/1.73
GLUCOSE BLD-MCNC: 90 MG/DL (ref 74–99)
HCT VFR BLD CALC: 45.2 % (ref 34–48)
HEMOGLOBIN: 13.7 G/DL (ref 11.5–15.5)
IMMATURE GRANULOCYTES #: 0.01 E9/L
IMMATURE GRANULOCYTES %: 0.2 % (ref 0–5)
LYMPHOCYTES ABSOLUTE: 1.95 E9/L (ref 1.5–4)
LYMPHOCYTES RELATIVE PERCENT: 38.8 % (ref 20–42)
MCH RBC QN AUTO: 28.5 PG (ref 26–35)
MCHC RBC AUTO-ENTMCNC: 30.3 % (ref 32–34.5)
MCV RBC AUTO: 94.2 FL (ref 80–99.9)
MONOCYTES ABSOLUTE: 0.42 E9/L (ref 0.1–0.95)
MONOCYTES RELATIVE PERCENT: 8.3 % (ref 2–12)
NEUTROPHILS ABSOLUTE: 2.54 E9/L (ref 1.8–7.3)
NEUTROPHILS RELATIVE PERCENT: 50.5 % (ref 43–80)
PDW BLD-RTO: 13.8 FL (ref 11.5–15)
PLATELET # BLD: 325 E9/L (ref 130–450)
PMV BLD AUTO: 10.4 FL (ref 7–12)
POTASSIUM SERPL-SCNC: 4.7 MMOL/L (ref 3.5–5)
RBC # BLD: 4.8 E12/L (ref 3.5–5.5)
SODIUM BLD-SCNC: 144 MMOL/L (ref 132–146)
T4 FREE: 1.27 NG/DL (ref 0.93–1.7)
TOTAL PROTEIN: 7.5 G/DL (ref 6.4–8.3)
TSH SERPL DL<=0.05 MIU/L-ACNC: 1.87 UIU/ML (ref 0.27–4.2)
VITAMIN B-12: 311 PG/ML (ref 211–946)
WBC # BLD: 5 E9/L (ref 4.5–11.5)

## 2021-03-05 PROCEDURE — 36415 COLL VENOUS BLD VENIPUNCTURE: CPT | Performed by: FAMILY MEDICINE

## 2021-03-06 LAB
CHOLESTEROL, TOTAL: 242 MG/DL (ref 0–199)
HDLC SERPL-MCNC: 48 MG/DL
LDL CHOLESTEROL CALCULATED: 170 MG/DL (ref 0–99)
TRIGL SERPL-MCNC: 119 MG/DL (ref 0–149)
VLDLC SERPL CALC-MCNC: 24 MG/DL

## 2021-03-11 ENCOUNTER — TELEPHONE (OUTPATIENT)
Dept: FAMILY MEDICINE CLINIC | Age: 81
End: 2021-03-11

## 2021-03-11 NOTE — TELEPHONE ENCOUNTER
Had labs on 3-5-21    We did not call with result. Lab result says he will discuss her results at her next visit. She has appt. 3-26-21. Did get second Covid vaccine yesterday has headache and muscle aches today. Reassured pt. This is normal. Can take Tylenol.

## 2021-03-26 ENCOUNTER — OFFICE VISIT (OUTPATIENT)
Dept: FAMILY MEDICINE CLINIC | Age: 81
End: 2021-03-26
Payer: MEDICARE

## 2021-03-26 VITALS
BODY MASS INDEX: 24.11 KG/M2 | WEIGHT: 131 LBS | RESPIRATION RATE: 20 BRPM | TEMPERATURE: 97.8 F | DIASTOLIC BLOOD PRESSURE: 70 MMHG | SYSTOLIC BLOOD PRESSURE: 130 MMHG | HEIGHT: 62 IN | OXYGEN SATURATION: 97 % | HEART RATE: 80 BPM

## 2021-03-26 DIAGNOSIS — E53.8 B12 DEFICIENCY: ICD-10-CM

## 2021-03-26 DIAGNOSIS — M19.90 ARTHRITIS: ICD-10-CM

## 2021-03-26 DIAGNOSIS — F41.9 ANXIETY: Primary | ICD-10-CM

## 2021-03-26 DIAGNOSIS — C69.20 MALIGNANT NEOPLASM OF RETINA, UNSPECIFIED LATERALITY (HCC): ICD-10-CM

## 2021-03-26 DIAGNOSIS — H81.10 BENIGN PAROXYSMAL POSITIONAL VERTIGO, UNSPECIFIED LATERALITY: ICD-10-CM

## 2021-03-26 DIAGNOSIS — E07.9 THYROID DISEASE: ICD-10-CM

## 2021-03-26 PROCEDURE — 99213 OFFICE O/P EST LOW 20 MIN: CPT | Performed by: FAMILY MEDICINE

## 2021-03-26 PROCEDURE — 1036F TOBACCO NON-USER: CPT | Performed by: FAMILY MEDICINE

## 2021-03-26 PROCEDURE — G8400 PT W/DXA NO RESULTS DOC: HCPCS | Performed by: FAMILY MEDICINE

## 2021-03-26 PROCEDURE — G8427 DOCREV CUR MEDS BY ELIG CLIN: HCPCS | Performed by: FAMILY MEDICINE

## 2021-03-26 PROCEDURE — G8482 FLU IMMUNIZE ORDER/ADMIN: HCPCS | Performed by: FAMILY MEDICINE

## 2021-03-26 PROCEDURE — G8420 CALC BMI NORM PARAMETERS: HCPCS | Performed by: FAMILY MEDICINE

## 2021-03-26 PROCEDURE — 1123F ACP DISCUSS/DSCN MKR DOCD: CPT | Performed by: FAMILY MEDICINE

## 2021-03-26 PROCEDURE — 1090F PRES/ABSN URINE INCON ASSESS: CPT | Performed by: FAMILY MEDICINE

## 2021-03-26 PROCEDURE — 4040F PNEUMOC VAC/ADMIN/RCVD: CPT | Performed by: FAMILY MEDICINE

## 2021-03-26 RX ORDER — LORAZEPAM 1 MG/1
1 TABLET ORAL 2 TIMES DAILY
Qty: 60 TABLET | Refills: 0 | Status: SHIPPED | OUTPATIENT
Start: 2021-03-26 | End: 2021-04-25

## 2021-03-26 ASSESSMENT — ENCOUNTER SYMPTOMS: EYE DISCHARGE: 1

## 2021-03-26 NOTE — PROGRESS NOTES
3/26/21  Alverda Sandhoff : 1940 Sex: female  Age: 79 y.o. Chief Complaint   Patient presents with   [employer]     lab review today     Other     needs AWV SCHEDULED, Also stopped taking Zoloft made her sick     Fatigue     since she had her covid vaccine, and since her surgery she had her [facility]       HAD SECOND SHOT FOR COVID  GO OVER LABS           Current Outpatient Medications:     LORazepam (ATIVAN) 1 MG tablet, Take 1 tablet by mouth 2 times daily for 30 days. , Disp: 60 tablet, Rfl: 0    ketotifen (ALAWAY) 0.025 % ophthalmic solution, Place 1 drop into the right eye daily, Disp: , Rfl:   Allergies   Allergen Reactions    Adhesive Tape     Pravachol [Pravastatin]     Toprol Xl [Metoprolol]     Zithromax [Azithromycin]        Review of Systems   Constitutional: Negative for appetite change, chills, diaphoresis, fatigue, fever and unexpected weight change. HENT: Negative for congestion, dental problem, ear discharge, ear pain, facial swelling, hearing loss, mouth sores, rhinorrhea, sinus pain, sore throat, tinnitus and voice change. Eyes: Positive for discharge (EYE SWELLING ) and visual disturbance (SLIGHT BLURRY VISION EYES BEING CHECKED  ON MONDAY ). Negative for photophobia. Respiratory: Negative for apnea, cough, chest tightness, shortness of breath and wheezing. Cardiovascular: Negative for palpitations. Chest pain: RIB ON LEFT TENDER    Gastrointestinal: Negative for abdominal distention, abdominal pain, constipation, diarrhea, nausea, rectal pain and vomiting. Endocrine: Negative for cold intolerance, heat intolerance and polyuria. Genitourinary: Negative for dysuria, frequency, hematuria and urgency. Musculoskeletal: Positive for arthralgias and back pain. Negative for gait problem, joint swelling, myalgias, neck pain and neck stiffness. Skin: Negative for rash and wound. Allergic/Immunologic: Negative for environmental allergies.    Neurological: Negative for dizziness, tremors, seizures, syncope, facial asymmetry, speech difficulty, weakness, light-headedness, numbness and headaches. Hematological: Does not bruise/bleed easily. Psychiatric/Behavioral: Negative for agitation, hallucinations and suicidal ideas. The patient is not nervous/anxious.         Past Medical History:   Diagnosis Date    Anxiety     Arthritis     Cancer ([facility] [address]     Depression     Eye disorder     Hives     Hyperlipidemia     Hypertension     Measles     Melanocytoma of retina     Osteoporosis     Thyroid disease     Vitamin B12 deficiency (non anemic)     Whooping cough      Past Surgical History:   Procedure Laterality Date    CARPAL TUNNEL RELEASE      CARPAL TUNNEL RELEASE Left     HAND    DILATION AND CURETTAGE OF UTERUS      EYE SURGERY      EYE SURGERY Left     EXTRACTED LEFT EYE - CANCER    JOINT REPLACEMENT      SHOULDER ARTHROPLASTY Right 12/22/2014    REVERSE    SHOULDER ARTHROPLASTY Bilateral     THYROIDECTOMY      TUBAL LIGATION      UTERINE SUSPENSION       Family History   Problem Relation Age of Onset    High Blood Pressure Mother     Other Sister         CEREBRAL INFARCTION    High Cholesterol Sister     High Blood Pressure Sister     High Cholesterol Brother     Heart Failure Brother     Cancer Brother     Diabetes Brother     Other Brother         CHRONIC LUNG DISEASE    Heart Attack Brother     Breast Cancer Niece      Social History     Socioeconomic History    Marital status:      Spouse name: Not on file    Number of children: Not on file    Years of education: Not on file    Highest education level: Not on file   Occupational History    Not on file   Social Needs    Financial resource strain: Not on file    Food insecurity     Worry: Not on file     Inability: Not on file    Transportation needs     Medical: Not on file     Non-medical: Not on file   Tobacco Use    Smoking status: Never Smoker    Smokeless tobacco: Never Used   Substance and Sexual Activity    Alcohol use: No    Drug use: No    Sexual activity: Not Currently     Partners: Male   Lifestyle    Physical activity     Days per week: Not on file     Minutes per session: Not on file    Stress: Not on file   Relationships    Social connections     Talks on phone: Not on file     Gets together: Not on file     Attends Muslim service: Not on file     Active member of club or organization: Not on file     Attends meetings of clubs or organizations: Not on file     Relationship status: Not on file    Intimate partner violence     Fear of current or ex partner: Not on file     Emotionally abused: Not on file     Physically abused: Not on file     Forced sexual activity: Not on file   Other Topics Concern    Not on file   Social History Narrative    Not on file       Patient Active Problem List   Diagnosis    Benign paroxysmal positional vertigo    Dizziness    Vitamin B12 deficiency (non anemic)    Thyroid disease    Osteoporosis    Hypertension    Hyperlipidemia    Cancer ([facility] [address]    Anxiety    Arthritis    Shingles rash    Malignant neoplasm of unspecified retina ([facility] [address]    Unspecified disorder of eye and adnexa        Vitals:    03/26/21 1104   BP: 130/70   Pulse: 80   Resp: 20   Temp: 97.8 °F (36.6 °C)   TempSrc: Temporal   SpO2: 97%   Weight: 131 lb (59.4 kg)   Height: 5' 2\" (1.575 m)       Physical Exam  Vitals signs reviewed. Constitutional:       General: She is not in acute distress. Appearance: She is not ill-appearing. HENT:      Head: Normocephalic. Right Ear: Tympanic membrane, ear canal and external ear normal. There is no impacted cerumen. Left Ear: Tympanic membrane, ear canal and external ear normal. There is no impacted cerumen. Nose: No rhinorrhea. Mouth/Throat:      Pharynx: No posterior oropharyngeal erythema. Eyes:      General:         Right eye: No discharge. Left eye: No discharge. Extraocular Movements: Extraocular movements intact. Pupils: Pupils are equal, round, and reactive to light. Comments: Artificial eye    Neck:      Musculoskeletal: Neck supple. Vascular: No carotid bruit. Cardiovascular:      Rate and Rhythm: Regular rhythm. Heart sounds: Normal heart sounds. No murmur. No gallop. Pulmonary:      Breath sounds: Normal breath sounds. No wheezing, rhonchi or rales. Abdominal:      Palpations: Abdomen is soft. There is no mass. Tenderness: There is no abdominal tenderness. There is no guarding or rebound. Hernia: No hernia is present. Musculoskeletal: Normal range of motion. Lymphadenopathy:      Cervical: No cervical adenopathy. Skin:     General: Skin is warm and dry. Findings: No bruising or rash. Neurological:      General: No focal deficit present. Mental Status: She is alert and oriented to person, place, and time. Cranial Nerves: No cranial nerve deficit. Motor: No weakness. Coordination: Coordination normal.      Gait: Gait normal.   Psychiatric:         Mood and Affect: Mood normal.         Behavior: Behavior normal.         Assessment and Plan:  Casandra Tavarez was seen today for discuss labs, other and fatigue. Diagnoses and all orders for this visit:    Anxiety  -     LORazepam (ATIVAN) 1 MG tablet; Take 1 tablet by mouth 2 times daily for 30 days. Benign paroxysmal positional vertigo, unspecified laterality    Arthritis    Malignant neoplasm of retina, unspecified laterality (HCC)    Thyroid disease    B12 deficiency          Discussions/Education provided to patients during visit:  [] Discussed the importance to stop smoking. [] Advised to monitor eating habits. [] Reviewed and discussed Imaging results. [x] Reviewed and discussed Lab results. [x] Discussed the importance of drinking plenty of fluids. [x] Cut down on Salt, Caffeine, and Sugar. [x] Continue Medications as Discussed.   [x] Communicated with patient any concerns, to phone office. 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       Seen By:  Keena Sykes DO

## 2021-03-28 ASSESSMENT — ENCOUNTER SYMPTOMS
FACIAL SWELLING: 0
SINUS PAIN: 0
CONSTIPATION: 0
ABDOMINAL DISTENTION: 0
ABDOMINAL PAIN: 0
RECTAL PAIN: 0
DIARRHEA: 0
COUGH: 0
NAUSEA: 0
VOICE CHANGE: 0
RHINORRHEA: 0
SORE THROAT: 0
BACK PAIN: 1
SHORTNESS OF BREATH: 0
WHEEZING: 0
VOMITING: 0
CHEST TIGHTNESS: 0
PHOTOPHOBIA: 0
APNEA: 0

## 2021-04-26 ENCOUNTER — OFFICE VISIT (OUTPATIENT)
Dept: FAMILY MEDICINE CLINIC | Age: 81
End: 2021-04-26
Payer: MEDICARE

## 2021-04-26 VITALS
HEART RATE: 84 BPM | SYSTOLIC BLOOD PRESSURE: 120 MMHG | WEIGHT: 130 LBS | RESPIRATION RATE: 20 BRPM | HEIGHT: 62 IN | BODY MASS INDEX: 23.92 KG/M2 | DIASTOLIC BLOOD PRESSURE: 80 MMHG | TEMPERATURE: 97.8 F | OXYGEN SATURATION: 98 %

## 2021-04-26 VITALS
SYSTOLIC BLOOD PRESSURE: 120 MMHG | TEMPERATURE: 97.8 F | HEART RATE: 84 BPM | OXYGEN SATURATION: 98 % | BODY MASS INDEX: 23.92 KG/M2 | HEIGHT: 62 IN | RESPIRATION RATE: 20 BRPM | WEIGHT: 130 LBS | DIASTOLIC BLOOD PRESSURE: 80 MMHG

## 2021-04-26 DIAGNOSIS — I10 ESSENTIAL HYPERTENSION: ICD-10-CM

## 2021-04-26 DIAGNOSIS — E07.9 THYROID DISEASE: ICD-10-CM

## 2021-04-26 DIAGNOSIS — E53.8 B12 DEFICIENCY: ICD-10-CM

## 2021-04-26 DIAGNOSIS — E78.2 MIXED HYPERLIPIDEMIA: ICD-10-CM

## 2021-04-26 DIAGNOSIS — F41.9 ANXIETY: ICD-10-CM

## 2021-04-26 DIAGNOSIS — H57.9 UNSPECIFIED DISORDER OF EYE AND ADNEXA: ICD-10-CM

## 2021-04-26 DIAGNOSIS — Z00.00 ROUTINE GENERAL MEDICAL EXAMINATION AT A HEALTH CARE FACILITY: Primary | ICD-10-CM

## 2021-04-26 DIAGNOSIS — C69.20 MALIGNANT NEOPLASM OF RETINA, UNSPECIFIED LATERALITY (HCC): Primary | ICD-10-CM

## 2021-04-26 DIAGNOSIS — R42 DIZZINESS: ICD-10-CM

## 2021-04-26 DIAGNOSIS — M19.90 ARTHRITIS: ICD-10-CM

## 2021-04-26 DIAGNOSIS — M81.0 AGE-RELATED OSTEOPOROSIS WITHOUT CURRENT PATHOLOGICAL FRACTURE: ICD-10-CM

## 2021-04-26 DIAGNOSIS — H81.10 BENIGN PAROXYSMAL POSITIONAL VERTIGO, UNSPECIFIED LATERALITY: ICD-10-CM

## 2021-04-26 PROCEDURE — 4040F PNEUMOC VAC/ADMIN/RCVD: CPT | Performed by: CLINICAL NURSE SPECIALIST

## 2021-04-26 PROCEDURE — G8427 DOCREV CUR MEDS BY ELIG CLIN: HCPCS | Performed by: FAMILY MEDICINE

## 2021-04-26 PROCEDURE — 1123F ACP DISCUSS/DSCN MKR DOCD: CPT | Performed by: CLINICAL NURSE SPECIALIST

## 2021-04-26 PROCEDURE — 1036F TOBACCO NON-USER: CPT | Performed by: FAMILY MEDICINE

## 2021-04-26 PROCEDURE — 1090F PRES/ABSN URINE INCON ASSESS: CPT | Performed by: FAMILY MEDICINE

## 2021-04-26 PROCEDURE — 1123F ACP DISCUSS/DSCN MKR DOCD: CPT | Performed by: FAMILY MEDICINE

## 2021-04-26 PROCEDURE — G8400 PT W/DXA NO RESULTS DOC: HCPCS | Performed by: FAMILY MEDICINE

## 2021-04-26 PROCEDURE — G0439 PPPS, SUBSEQ VISIT: HCPCS | Performed by: CLINICAL NURSE SPECIALIST

## 2021-04-26 PROCEDURE — 99213 OFFICE O/P EST LOW 20 MIN: CPT | Performed by: FAMILY MEDICINE

## 2021-04-26 PROCEDURE — G8420 CALC BMI NORM PARAMETERS: HCPCS | Performed by: FAMILY MEDICINE

## 2021-04-26 PROCEDURE — 4040F PNEUMOC VAC/ADMIN/RCVD: CPT | Performed by: FAMILY MEDICINE

## 2021-04-26 RX ORDER — LORAZEPAM 0.5 MG/1
0.5 TABLET ORAL 2 TIMES DAILY
COMMUNITY
End: 2021-06-28 | Stop reason: SDUPTHER

## 2021-04-26 ASSESSMENT — ENCOUNTER SYMPTOMS
RHINORRHEA: 0
CONSTIPATION: 0
APNEA: 0
NAUSEA: 0
SORE THROAT: 0
ABDOMINAL DISTENTION: 0
SINUS PAIN: 0
BACK PAIN: 1
DIARRHEA: 0
RECTAL PAIN: 0
COUGH: 0
SHORTNESS OF BREATH: 0
VOICE CHANGE: 0
ABDOMINAL PAIN: 0
PHOTOPHOBIA: 0
VOMITING: 0
CHEST TIGHTNESS: 0
WHEEZING: 0
FACIAL SWELLING: 0

## 2021-04-26 ASSESSMENT — LIFESTYLE VARIABLES
AUDIT TOTAL SCORE: INCOMPLETE
AUDIT-C TOTAL SCORE: INCOMPLETE
HOW OFTEN DO YOU HAVE A DRINK CONTAINING ALCOHOL: NEVER

## 2021-04-26 ASSESSMENT — PATIENT HEALTH QUESTIONNAIRE - PHQ9
SUM OF ALL RESPONSES TO PHQ9 QUESTIONS 1 & 2: 2
SUM OF ALL RESPONSES TO PHQ QUESTIONS 1-9: 2

## 2021-04-26 NOTE — PROGRESS NOTES
21  King Yosvany : 1940 Sex: female  Age: 79 y.o. Chief Complaint   Patient presents with   Jammie Alan     her arthrirtis in her hands are hurting        Overall check up   Eye situation is still a problem but seeing DOCTOR VIKTORIA roe  docs   Still carrying heavy yard  things        Review of Systems   Constitutional: Negative for appetite change, chills, diaphoresis, fatigue, fever and unexpected weight change. HENT: Negative for congestion, dental problem, ear discharge, ear pain, facial swelling, hearing loss, mouth sores, rhinorrhea, sinus pain, sore throat, tinnitus and voice change. Eyes: Positive for visual disturbance (previous cancer of eye ). Negative for photophobia. Respiratory: Negative for apnea, cough, chest tightness, shortness of breath and wheezing. Cardiovascular: Positive for chest pain (pain on chest area from work ). Negative for palpitations. Gastrointestinal: Negative for abdominal distention, abdominal pain, constipation, diarrhea, nausea, rectal pain and vomiting. Endocrine: Negative for cold intolerance, heat intolerance and polyuria. Genitourinary: Negative for dysuria, frequency, hematuria and urgency. Musculoskeletal: Positive for arthralgias and back pain. Negative for gait problem, joint swelling, myalgias, neck pain and neck stiffness. Skin: Negative for rash and wound. Allergic/Immunologic: Negative for environmental allergies. Neurological: Negative for dizziness, tremors, seizures, syncope, facial asymmetry, speech difficulty, weakness, light-headedness, numbness and headaches. Hematological: Does not bruise/bleed easily. Psychiatric/Behavioral: Negative for agitation, hallucinations and suicidal ideas. The patient is not nervous/anxious. Physical Exam  Vitals signs reviewed. Constitutional:       General: She is not in acute distress. Appearance: She is not ill-appearing. HENT:      Head: Normocephalic. Right Ear: Tympanic membrane, ear canal and external ear normal. There is no impacted cerumen. Left Ear: Tympanic membrane, ear canal and external ear normal. There is no impacted cerumen. Nose: No rhinorrhea. Mouth/Throat:      Pharynx: No posterior oropharyngeal erythema. Eyes:      General:         Right eye: No discharge. Left eye: No discharge. Comments: Eye cancer one artificial eye    Neck:      Musculoskeletal: Neck supple. Vascular: No carotid bruit. Cardiovascular:      Rate and Rhythm: Regular rhythm. Heart sounds: Normal heart sounds. No murmur. No gallop. Comments: Pain on chest area from srtructural   Pulmonary:      Breath sounds: Normal breath sounds. No wheezing, rhonchi or rales. Abdominal:      Palpations: Abdomen is soft. There is no mass. Tenderness: There is no abdominal tenderness. There is no guarding or rebound. Hernia: No hernia is present. Musculoskeletal: Normal range of motion. Lymphadenopathy:      Cervical: No cervical adenopathy. Skin:     General: Skin is warm and dry. Findings: No bruising or rash. Neurological:      General: No focal deficit present. Mental Status: She is alert and oriented to person, place, and time. Cranial Nerves: No cranial nerve deficit. Motor: No weakness. Coordination: Coordination normal.      Gait: Gait normal.   Psychiatric:         Mood and Affect: Mood normal.         Behavior: Behavior normal.         Assessment and Plan:  Milton Wilkinson was seen today for check-up.     Diagnoses and all orders for this visit:    Malignant neoplasm of retina, unspecified laterality ([address]    Essential hypertension    Age-related osteoporosis without current pathological fracture    Benign paroxysmal positional vertigo, unspecified laterality    B12 deficiency    Mixed hyperlipidemia    Dizziness    Anxiety          Discussions/Education provided to patients during visit:  [] Discussed the importance to stop smoking. [] Advised to monitor eating habits. [] Reviewed and discussed Imaging results. [x] Reviewed and discussed Lab results. [x] Discussed the importance of drinking plenty of fluids. [x] Cut down on Salt, Caffeine, and Sugar. [x] Continue Medications as Discussed. [x] Communicated with patient any concerns, to phone office. conseling and discussion of at home tasks discussed       No follow-ups on file.       Seen By:  Perez Avila, DO

## 2021-04-26 NOTE — PATIENT INSTRUCTIONS
Personalized Preventive Plan for Jared Jones - 4/26/2021  Medicare offers a range of preventive health benefits. Some of the tests and screenings are paid in full while other may be subject to a deductible, co-insurance, and/or copay. Some of these benefits include a comprehensive review of your medical history including lifestyle, illnesses that may run in your family, and various assessments and screenings as appropriate. After reviewing your medical record and screening and assessments performed today your provider may have ordered immunizations, labs, imaging, and/or referrals for you. A list of these orders (if applicable) as well as your Preventive Care list are included within your After Visit Summary for your review. Other Preventive Recommendations:    · A preventive eye exam performed by an eye specialist is recommended every 1-2 years to screen for glaucoma; cataracts, macular degeneration, and other eye disorders. · A preventive dental visit is recommended every 6 months. · Try to get at least 150 minutes of exercise per week or 10,000 steps per day on a pedometer . · Order or download the FREE \"Exercise & Physical Activity: Your Everyday Guide\" from [employer]. Call 9-551.554.2366 or search [employer] online. · You need 4644-5404 mg of calcium and 0606-3979 IU of vitamin D per day. It is possible to meet your calcium requirement with diet alone, but a vitamin D supplement is usually necessary to meet this goal.  · When exposed to the sun, use a sunscreen that protects against both UVA and UVB radiation with an SPF of 30 or greater. Reapply every 2 to 3 hours or after sweating, drying off with a towel, or swimming. · Always wear a seat belt when traveling in a car. Always wear a helmet when riding a bicycle or motorcycle.

## 2021-05-18 ENCOUNTER — OFFICE VISIT (OUTPATIENT)
Dept: FAMILY MEDICINE CLINIC | Age: 81
End: 2021-05-18
Payer: MEDICARE

## 2021-05-18 VITALS
SYSTOLIC BLOOD PRESSURE: 122 MMHG | HEART RATE: 63 BPM | DIASTOLIC BLOOD PRESSURE: 70 MMHG | TEMPERATURE: 98 F | OXYGEN SATURATION: 96 % | WEIGHT: 132 LBS | HEIGHT: 62 IN | BODY MASS INDEX: 24.29 KG/M2 | RESPIRATION RATE: 20 BRPM

## 2021-05-18 DIAGNOSIS — I10 ESSENTIAL HYPERTENSION: Primary | ICD-10-CM
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
## 2021-05-18 PROCEDURE — 99213 OFFICE O/P EST LOW 20 MIN: CPT | Performed by: NURSE PRACTITIONER

## 2021-05-18 PROCEDURE — 1036F TOBACCO NON-USER: CPT | Performed by: NURSE PRACTITIONER

## 2021-05-18 PROCEDURE — 1090F PRES/ABSN URINE INCON ASSESS: CPT | Performed by: NURSE PRACTITIONER

## 2021-05-18 PROCEDURE — 1123F ACP DISCUSS/DSCN MKR DOCD: CPT | Performed by: NURSE PRACTITIONER

## 2021-05-18 PROCEDURE — G8400 PT W/DXA NO RESULTS DOC: HCPCS | Performed by: NURSE PRACTITIONER

## 2021-05-18 PROCEDURE — G8427 DOCREV CUR MEDS BY ELIG CLIN: HCPCS | Performed by: NURSE PRACTITIONER

## 2021-05-18 PROCEDURE — G8420 CALC BMI NORM PARAMETERS: HCPCS | Performed by: NURSE PRACTITIONER

## 2021-05-18 PROCEDURE — 4040F PNEUMOC VAC/ADMIN/RCVD: CPT | Performed by: NURSE PRACTITIONER

## 2021-05-18 RX ORDER — DOXYCYCLINE HYCLATE 100 MG
100 TABLET ORAL 2 TIMES DAILY
Qty: 20 TABLET | Refills: 0 | Status: SHIPPED
Start: 2021-05-18 | End: 2021-05-28

## 2021-05-18 SDOH — ECONOMIC STABILITY: FOOD INSECURITY: WITHIN THE PAST 12 MONTHS, THE FOOD YOU BOUGHT JUST DIDN'T LAST AND YOU DIDN'T HAVE MONEY TO GET MORE.: NEVER TRUE

## 2021-05-18 SDOH — ECONOMIC STABILITY: FOOD INSECURITY: WITHIN THE PAST 12 MONTHS, YOU WORRIED THAT YOUR FOOD WOULD RUN OUT BEFORE YOU GOT MONEY TO BUY MORE.: NEVER TRUE

## 2021-05-18 ASSESSMENT — ENCOUNTER SYMPTOMS
COLOR CHANGE: 0
ANAL BLEEDING: 0
NAUSEA: 0
RECTAL PAIN: 0
SHORTNESS OF BREATH: 0
VOMITING: 0
DIARRHEA: 0
VOICE CHANGE: 0
CONSTIPATION: 0
EYE REDNESS: 0
COUGH: 0
SORE THROAT: 0
CHEST TIGHTNESS: 0
EYE ITCHING: 0
WHEEZING: 0
FACIAL SWELLING: 0
CHOKING: 0
PHOTOPHOBIA: 0
ABDOMINAL DISTENTION: 0
SINUS PAIN: 0
APNEA: 0
EYE PAIN: 0
BLOOD IN STOOL: 0
EYE DISCHARGE: 0
RHINORRHEA: 0
BACK PAIN: 0
ABDOMINAL PAIN: 0
TROUBLE SWALLOWING: 0
SINUS PRESSURE: 0
STRIDOR: 0

## 2021-05-18 ASSESSMENT — PATIENT HEALTH QUESTIONNAIRE - PHQ9
1. LITTLE INTEREST OR PLEASURE IN DOING THINGS: 0
SUM OF ALL RESPONSES TO PHQ QUESTIONS 1-9: 1
SUM OF ALL RESPONSES TO PHQ9 QUESTIONS 1 & 2: 1

## 2021-05-18 NOTE — PROGRESS NOTES
21  Isatu Wylie : 1940 Sex: female  Age: 79 y.o. Chief Complaint   Patient presents with    Tick Removal     patient has atick bite that was a small tick she removed today left upper thigh, it is red and ring around warm to touch       Patient had tick bite left upper leg. Red, warm area about nickel size. Review of Systems   Constitutional: Negative for activity change, appetite change, chills, diaphoresis, fatigue, fever and unexpected weight change. HENT: Negative for congestion, dental problem, drooling, ear discharge, ear pain, facial swelling, hearing loss, mouth sores, nosebleeds, postnasal drip, rhinorrhea, sinus pressure, sinus pain, sneezing, sore throat, tinnitus, trouble swallowing and voice change. Eyes: Negative for photophobia, pain, discharge, redness, itching and visual disturbance. Respiratory: Negative for apnea, cough, choking, chest tightness, shortness of breath, wheezing and stridor. Cardiovascular: Negative for chest pain, palpitations and leg swelling. Gastrointestinal: Negative for abdominal distention, abdominal pain, anal bleeding, blood in stool, constipation, diarrhea, nausea, rectal pain and vomiting. Endocrine: Negative for cold intolerance, heat intolerance, polydipsia, polyphagia and polyuria. Genitourinary: Negative for decreased urine volume, difficulty urinating, dysuria, enuresis, flank pain, frequency, genital sores, hematuria and urgency. Musculoskeletal: Negative for arthralgias, back pain, gait problem, joint swelling, myalgias, neck pain and neck stiffness. Skin: Positive for wound (bite. ). Negative for color change, pallor and rash. Allergic/Immunologic: Negative for environmental allergies, food allergies and immunocompromised state. Neurological: Negative for dizziness, tremors, seizures, syncope, facial asymmetry, speech difficulty, weakness, light-headedness, numbness and headaches.    Hematological: Negative for adenopathy. Does not bruise/bleed easily. Psychiatric/Behavioral: Negative for agitation, behavioral problems, confusion, decreased concentration, hallucinations, self-injury, sleep disturbance and suicidal ideas. The patient is not nervous/anxious and is not hyperactive. Current Outpatient Medications:     doxycycline hyclate (VIBRA-TABS) 100 MG tablet, Take 1 tablet by mouth 2 times daily for 10 days, Disp: 20 tablet, Rfl: 0    LORazepam (ATIVAN) 0.5 MG tablet, Take 0.5 mg by mouth 2 times daily. , Disp: , Rfl:     ketotifen (ALAWAY) 0.025 % ophthalmic solution, Place 1 drop into the right eye daily, Disp: , Rfl:   Allergies   Allergen Reactions    Adhesive Tape     Pravachol [Pravastatin]     Toprol Xl [Metoprolol]     Zithromax [Azithromycin]        Past Medical History:   Diagnosis Date    Anxiety     Arthritis     Cancer ([facility] [address]     Depression     Eye disorder     Hives     Hyperlipidemia     Hypertension     Measles     Melanocytoma of retina     Osteoporosis     Thyroid disease     Vitamin B12 deficiency (non anemic)     Whooping cough      Past Surgical History:   Procedure Laterality Date    CARPAL TUNNEL RELEASE      CARPAL TUNNEL RELEASE Left     HAND    DILATION AND CURETTAGE OF UTERUS      EYE SURGERY      EYE SURGERY Left     EXTRACTED LEFT EYE - CANCER    JOINT REPLACEMENT      SHOULDER ARTHROPLASTY Right 12/22/2014    REVERSE    SHOULDER ARTHROPLASTY Bilateral     THYROIDECTOMY      TUBAL LIGATION      UTERINE SUSPENSION       Family History   Problem Relation Age of Onset    High Blood Pressure Mother     Other Sister         CEREBRAL INFARCTION    High Cholesterol Sister     High Blood Pressure Sister     High Cholesterol Brother     Heart Failure Brother     Cancer Brother     Diabetes Brother     Other Brother         CHRONIC LUNG DISEASE    Heart Attack Brother     Breast Cancer Niece      Social History     Socioeconomic History    Marital status:      Spouse name: Not on file    Number of children: Not on file    Years of education: Not on file    Highest education level: Not on file   Occupational History    Not on file   Tobacco Use    Smoking status: Never Smoker    Smokeless tobacco: Never Used   Substance and Sexual Activity    Alcohol use: No    Drug use: No    Sexual activity: Not Currently     Partners: Male   Other Topics Concern    Not on file   Social History Narrative    Not on file     Social Determinants of Health     Financial Resource Strain: Low Risk     Difficulty of Paying Living Expenses: Not very hard   Food Insecurity: No Food Insecurity    Worried About Running Out of Food in the Last Year: Never true    Mayi of Food in the Last Year: Never true   Transportation Needs:     Lack of Transportation (Medical):      Lack of Transportation (Non-Medical):    Physical Activity:     Days of Exercise per Week:     Minutes of Exercise per Session:    Stress:     Feeling of Stress :    Social Connections:     Frequency of Communication with Friends and Family:     Frequency of Social Gatherings with Friends and Family:     Attends Jewish Services:     Active Member of Clubs or Organizations:     Attends Club or Organization Meetings:     Marital Status:    Intimate Partner Violence:     Fear of Current or Ex-Partner:     Emotionally Abused:     Physically Abused:     Sexually Abused:      Patient Active Problem List   Diagnosis    Benign paroxysmal positional vertigo    Dizziness    Vitamin B12 deficiency (non anemic)    Thyroid disease    Osteoporosis    Hypertension    Hyperlipidemia    Cancer ([address]    Anxiety    Arthritis    Shingles rash    Malignant neoplasm of unspecified retina ([address]    Unspecified disorder of eye and adnexa    Routine general medical examination at a health care facility    Tick bite        Vitals:    05/18/21 1134   BP: 122/70   Pulse: 63   Resp: 20   Temp: 98 °F (36.7 °C)   TempSrc: Temporal   SpO2: 96%   Weight: 132 lb (59.9 kg)   Height: 5' 2\" (1.575 m)       Physical Exam  Vitals and nursing note reviewed. Constitutional:       General: She is not in acute distress. Appearance: Normal appearance. She is normal weight. She is not ill-appearing, toxic-appearing or diaphoretic. HENT:      Head: Normocephalic and atraumatic. Right Ear: Tympanic membrane, ear canal and external ear normal. There is no impacted cerumen. Left Ear: Tympanic membrane, ear canal and external ear normal. There is no impacted cerumen. Nose: Nose normal. No congestion or rhinorrhea. Mouth/Throat:      Mouth: Mucous membranes are moist.      Pharynx: Oropharynx is clear. No oropharyngeal exudate or posterior oropharyngeal erythema. Eyes:      General: No scleral icterus. Right eye: No discharge. Left eye: No discharge. Extraocular Movements: Extraocular movements intact. Conjunctiva/sclera: Conjunctivae normal.      Pupils: Pupils are equal, round, and reactive to light. Neck:      Vascular: No carotid bruit. Cardiovascular:      Rate and Rhythm: Normal rate and regular rhythm. Pulses: Normal pulses. Heart sounds: Normal heart sounds. No murmur heard. No friction rub. No gallop. Pulmonary:      Effort: Pulmonary effort is normal. No respiratory distress. Breath sounds: No stridor. No wheezing, rhonchi or rales. Chest:      Chest wall: No tenderness. Abdominal:      General: Abdomen is flat. Bowel sounds are normal. There is no distension. Palpations: Abdomen is soft. There is no mass. Tenderness: There is no abdominal tenderness. There is no right CVA tenderness, left CVA tenderness, guarding or rebound. Hernia: No hernia is present. Musculoskeletal:         General: No swelling, tenderness, deformity or signs of injury. Normal range of motion.       Cervical back: Normal range of motion and neck supple. No rigidity. No muscular tenderness. Right lower leg: No edema. Left lower leg: No edema. Lymphadenopathy:      Cervical: No cervical adenopathy. Skin:     General: Skin is warm and dry. Capillary Refill: Capillary refill takes less than 2 seconds. Coloration: Skin is not jaundiced or pale. Findings: No bruising, erythema, lesion or rash. Comments: Nickel size red raised warm tick bite   Neurological:      General: No focal deficit present. Mental Status: She is alert and oriented to person, place, and time. Mental status is at baseline. Cranial Nerves: No cranial nerve deficit. Sensory: No sensory deficit. Motor: No weakness. Coordination: Coordination normal.      Gait: Gait normal.      Deep Tendon Reflexes: Reflexes normal.   Psychiatric:         Mood and Affect: Mood normal.         Behavior: Behavior normal.         Thought Content: Thought content normal.         Judgment: Judgment normal.         Assessment and Plan:  Po Del Castillo was seen today for tick removal.    Diagnoses and all orders for this visit:    Essential hypertension    Tick bite, initial encounter    Other orders  -     doxycycline hyclate (VIBRA-TABS) 100 MG tablet; Take 1 tablet by mouth 2 times daily for 10 days        Discussions/Education provided to patients during visit:  [] Discussed the importance to stop smoking. [] Advised to monitor eating habits. [] Reviewed and discussed Imaging results. [] Reviewed and discussed Lab results. [] Discussed the importance of drinking plenty of fluids. [] Cut down on Salt and Caffeine.  [] Exercise 2-3 times weekly, if not more. [] Cut down on Sugar and Fats. [x] Continue Medications as Discussed. [x] Communicated with patient any concerns, to phone office. [x] Follow up as directed. Return if symptoms worsen or fail to improve.       Seen By:  TORI Raymond - CNP

## 2021-05-26 PROBLEM — Z00.00 ROUTINE GENERAL MEDICAL EXAMINATION AT A HEALTH CARE FACILITY: Status: RESOLVED | Noted: 2021-04-26 | Resolved: 2021-05-26

## 2021-05-28 ENCOUNTER — OFFICE VISIT (OUTPATIENT)
Dept: FAMILY MEDICINE CLINIC | Age: 81
End: 2021-05-28
Payer: MEDICARE

## 2021-05-28 VITALS
SYSTOLIC BLOOD PRESSURE: 120 MMHG | TEMPERATURE: 96.8 F | DIASTOLIC BLOOD PRESSURE: 68 MMHG | HEIGHT: 62 IN | HEART RATE: 71 BPM | BODY MASS INDEX: 24 KG/M2 | OXYGEN SATURATION: 97 % | WEIGHT: 130.4 LBS

## 2021-05-28 DIAGNOSIS — C80.1 CANCER (HCC): ICD-10-CM
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
## 2021-05-28 DIAGNOSIS — F41.9 ANXIETY: ICD-10-CM

## 2021-05-28 DIAGNOSIS — E78.2 MIXED HYPERLIPIDEMIA: ICD-10-CM

## 2021-05-28 DIAGNOSIS — I10 ESSENTIAL HYPERTENSION: ICD-10-CM

## 2021-05-28 DIAGNOSIS — H81.10 BENIGN PAROXYSMAL POSITIONAL VERTIGO, UNSPECIFIED LATERALITY: ICD-10-CM

## 2021-05-28 DIAGNOSIS — E07.9 THYROID DISEASE: ICD-10-CM

## 2021-05-28 DIAGNOSIS — C69.20 MALIGNANT NEOPLASM OF RETINA, UNSPECIFIED LATERALITY (HCC): ICD-10-CM

## 2021-05-28 PROCEDURE — G8400 PT W/DXA NO RESULTS DOC: HCPCS | Performed by: FAMILY MEDICINE

## 2021-05-28 PROCEDURE — G8427 DOCREV CUR MEDS BY ELIG CLIN: HCPCS | Performed by: FAMILY MEDICINE

## 2021-05-28 PROCEDURE — G8420 CALC BMI NORM PARAMETERS: HCPCS | Performed by: FAMILY MEDICINE

## 2021-05-28 PROCEDURE — 1123F ACP DISCUSS/DSCN MKR DOCD: CPT | Performed by: FAMILY MEDICINE

## 2021-05-28 PROCEDURE — 99213 OFFICE O/P EST LOW 20 MIN: CPT | Performed by: FAMILY MEDICINE

## 2021-05-28 PROCEDURE — 1090F PRES/ABSN URINE INCON ASSESS: CPT | Performed by: FAMILY MEDICINE

## 2021-05-28 PROCEDURE — 4040F PNEUMOC VAC/ADMIN/RCVD: CPT | Performed by: FAMILY MEDICINE

## 2021-05-28 PROCEDURE — 1036F TOBACCO NON-USER: CPT | Performed by: FAMILY MEDICINE

## 2021-05-28 ASSESSMENT — ENCOUNTER SYMPTOMS
VOMITING: 0
WHEEZING: 0
BACK PAIN: 1
NAUSEA: 0
ABDOMINAL PAIN: 0
SINUS PAIN: 0
CONSTIPATION: 0
APNEA: 0
VOICE CHANGE: 0
COUGH: 0
RECTAL PAIN: 0
FACIAL SWELLING: 0
SHORTNESS OF BREATH: 0
ABDOMINAL DISTENTION: 0
SORE THROAT: 0
DIARRHEA: 0
CHEST TIGHTNESS: 0
PHOTOPHOBIA: 0
RHINORRHEA: 0

## 2021-05-28 NOTE — PROGRESS NOTES
21  Talita Segal : 1940 Sex: female  Age: 79 y.o. Chief Complaint   Patient presents with    Hyperlipidemia     check up     Hypertension     check up     Other     check up on tick bite        Follow up on tick bite       Review of Systems   Constitutional: Negative for appetite change, chills, diaphoresis, fatigue, fever and unexpected weight change. HENT: Negative for congestion, dental problem, ear discharge, ear pain, facial swelling, hearing loss, mouth sores, rhinorrhea, sinus pain, sore throat, tinnitus and voice change. Eyes: Negative for photophobia and visual disturbance (no visual changes). Respiratory: Negative for apnea, cough, chest tightness, shortness of breath and wheezing. Cardiovascular: Negative for chest pain and palpitations. Gastrointestinal: Negative for abdominal distention, abdominal pain, constipation, diarrhea, nausea, rectal pain and vomiting. Endocrine: Negative for cold intolerance, heat intolerance and polyuria. Genitourinary: Negative for dysuria, frequency, hematuria and urgency. Musculoskeletal: Positive for arthralgias, back pain and gait problem. Negative for joint swelling, myalgias, neck pain and neck stiffness. Skin: Positive for wound (tick bite was treated with doxy still somne redness). Negative for rash. Allergic/Immunologic: Negative for environmental allergies. Neurological: Negative for dizziness, tremors, seizures, syncope, facial asymmetry, speech difficulty, weakness, light-headedness, numbness and headaches. Hematological: Does not bruise/bleed easily. Psychiatric/Behavioral: Negative for agitation, hallucinations and suicidal ideas. The patient is nervous/anxious. Physical Exam  Vitals reviewed. Constitutional:       General: She is not in acute distress. Appearance: She is not ill-appearing. HENT:      Head: Normocephalic.       Right Ear: Tympanic membrane, ear canal and external ear normal.

## 2021-06-28 ENCOUNTER — OFFICE VISIT (OUTPATIENT)
Dept: FAMILY MEDICINE CLINIC | Age: 81
End: 2021-06-28
Payer: MEDICARE

## 2021-06-28 VITALS
SYSTOLIC BLOOD PRESSURE: 130 MMHG | OXYGEN SATURATION: 96 % | TEMPERATURE: 99.1 F | HEART RATE: 78 BPM | BODY MASS INDEX: 24.9 KG/M2 | HEIGHT: 60 IN | DIASTOLIC BLOOD PRESSURE: 78 MMHG | WEIGHT: 126.8 LBS

## 2021-06-28 DIAGNOSIS — E53.8 VITAMIN B12 DEFICIENCY (NON ANEMIC): ICD-10-CM

## 2021-06-28 DIAGNOSIS — L30.9 FACIAL DERMATITIS: ICD-10-CM

## 2021-06-28 DIAGNOSIS — H81.10 BENIGN PAROXYSMAL POSITIONAL VERTIGO, UNSPECIFIED LATERALITY: ICD-10-CM

## 2021-06-28 DIAGNOSIS — M81.0 AGE-RELATED OSTEOPOROSIS WITHOUT CURRENT PATHOLOGICAL FRACTURE: ICD-10-CM

## 2021-06-28 DIAGNOSIS — M19.90 ARTHRITIS: ICD-10-CM

## 2021-06-28 DIAGNOSIS — F41.9 ANXIETY: ICD-10-CM

## 2021-06-28 DIAGNOSIS — C69.20 MALIGNANT NEOPLASM OF RETINA, UNSPECIFIED LATERALITY (HCC): ICD-10-CM

## 2021-06-28 DIAGNOSIS — L30.9 DERMATITIS: ICD-10-CM

## 2021-06-28 DIAGNOSIS — E07.9 THYROID DISEASE: ICD-10-CM

## 2021-06-28 DIAGNOSIS — E78.2 MIXED HYPERLIPIDEMIA: Primary | ICD-10-CM

## 2021-06-28 DIAGNOSIS — I10 ESSENTIAL HYPERTENSION: ICD-10-CM

## 2021-06-28 PROCEDURE — G8400 PT W/DXA NO RESULTS DOC: HCPCS | Performed by: FAMILY MEDICINE

## 2021-06-28 PROCEDURE — 1123F ACP DISCUSS/DSCN MKR DOCD: CPT | Performed by: FAMILY MEDICINE

## 2021-06-28 PROCEDURE — 4040F PNEUMOC VAC/ADMIN/RCVD: CPT | Performed by: FAMILY MEDICINE

## 2021-06-28 PROCEDURE — 1090F PRES/ABSN URINE INCON ASSESS: CPT | Performed by: FAMILY MEDICINE

## 2021-06-28 PROCEDURE — G8420 CALC BMI NORM PARAMETERS: HCPCS | Performed by: FAMILY MEDICINE

## 2021-06-28 PROCEDURE — 99213 OFFICE O/P EST LOW 20 MIN: CPT | Performed by: FAMILY MEDICINE

## 2021-06-28 PROCEDURE — 1036F TOBACCO NON-USER: CPT | Performed by: FAMILY MEDICINE

## 2021-06-28 PROCEDURE — G8427 DOCREV CUR MEDS BY ELIG CLIN: HCPCS | Performed by: FAMILY MEDICINE

## 2021-06-28 RX ORDER — LORAZEPAM 0.5 MG/1
0.5 TABLET ORAL 2 TIMES DAILY
Qty: 60 TABLET | Refills: 0 | Status: SHIPPED
Start: 2021-06-28 | End: 2021-09-17 | Stop reason: SDUPTHER

## 2021-06-28 RX ORDER — TRIAMCINOLONE ACETONIDE 1 MG/G
CREAM TOPICAL
Qty: 30 G | Refills: 1 | Status: SHIPPED
Start: 2021-06-28 | End: 2022-06-15

## 2021-06-28 ASSESSMENT — ENCOUNTER SYMPTOMS
VOMITING: 0
FACIAL SWELLING: 0
SINUS PAIN: 0
CONSTIPATION: 0
BACK PAIN: 1
PHOTOPHOBIA: 0
NAUSEA: 0
VOICE CHANGE: 0
WHEEZING: 0
SORE THROAT: 0
DIARRHEA: 0
APNEA: 0
ABDOMINAL DISTENTION: 0
SHORTNESS OF BREATH: 0
COUGH: 0
RHINORRHEA: 0
ABDOMINAL PAIN: 0
RECTAL PAIN: 0
CHEST TIGHTNESS: 0

## 2021-06-28 NOTE — PROGRESS NOTES
21  Eula Sandifer : 1940 Sex: female  Age: 79 y.o. Chief Complaint   Patient presents with    Rash     Rash on forehead       Overall check up and rash on forehead       Review of Systems   Constitutional: Negative for appetite change, chills, diaphoresis, fatigue, fever and unexpected weight change. HENT: Negative for congestion, dental problem, ear discharge, ear pain, facial swelling, hearing loss, mouth sores, rhinorrhea, sinus pain, sore throat, tinnitus and voice change. Eyes: Negative for photophobia and visual disturbance (no visual changes). Respiratory: Negative for apnea, cough, chest tightness, shortness of breath and wheezing. Cardiovascular: Negative for chest pain and palpitations. Gastrointestinal: Negative for abdominal distention, abdominal pain, constipation, diarrhea, nausea, rectal pain and vomiting. Endocrine: Negative for cold intolerance, heat intolerance and polyuria. Genitourinary: Negative for dysuria, frequency, hematuria and urgency. Musculoskeletal: Positive for arthralgias and back pain. Negative for gait problem, joint swelling, myalgias, neck pain and neck stiffness. Skin: Positive for rash (forehead and back rash and back rash raised ). Negative for wound. Allergic/Immunologic: Negative for environmental allergies. Neurological: Negative for dizziness, tremors, seizures, syncope, facial asymmetry, speech difficulty, weakness, light-headedness, numbness and headaches. Hematological: Does not bruise/bleed easily. Psychiatric/Behavioral: Negative for agitation, hallucinations and suicidal ideas. The patient is not nervous/anxious. Physical Exam  Vitals reviewed. Constitutional:       General: She is not in acute distress. Appearance: She is not ill-appearing. HENT:      Head: Normocephalic. Comments: One eye      Right Ear: Tympanic membrane, ear canal and external ear normal. There is no impacted cerumen. Left Ear: Tympanic membrane, ear canal and external ear normal. There is no impacted cerumen. Nose: No rhinorrhea. Mouth/Throat:      Pharynx: No posterior oropharyngeal erythema. Eyes:      General:         Right eye: No discharge. Left eye: No discharge. Extraocular Movements: Extraocular movements intact. Pupils: Pupils are equal, round, and reactive to light. Neck:      Vascular: No carotid bruit. Cardiovascular:      Rate and Rhythm: Regular rhythm. Heart sounds: Normal heart sounds. No murmur heard. No gallop. Pulmonary:      Breath sounds: Normal breath sounds. No wheezing, rhonchi or rales. Abdominal:      Palpations: Abdomen is soft. There is no mass. Tenderness: There is no abdominal tenderness. There is no guarding or rebound. Hernia: No hernia is present. Musculoskeletal:         General: Normal range of motion. Cervical back: Neck supple. Lymphadenopathy:      Cervical: No cervical adenopathy. Skin:     General: Skin is warm and dry. Findings: Erythema and rash (forehead and itches and hurts and upper back ) present. No bruising. Neurological:      General: No focal deficit present. Mental Status: She is alert and oriented to person, place, and time. Cranial Nerves: No cranial nerve deficit. Motor: No weakness. Coordination: Coordination normal.      Gait: Gait normal.   Psychiatric:         Mood and Affect: Mood normal.         Behavior: Behavior normal.         Assessment and Plan:  Maria Elena Abdi was seen today for rash.     Diagnoses and all orders for this visit:    Mixed hyperlipidemia    Malignant neoplasm of retina, unspecified laterality ([address]    Age-related osteoporosis without current pathological fracture    Essential hypertension    Benign paroxysmal positional vertigo, unspecified laterality    Arthritis    Vitamin B12 deficiency (non anemic)    Thyroid disease    Anxiety    Facial dermatitis  -

## 2021-08-02 ENCOUNTER — OFFICE VISIT (OUTPATIENT)
Dept: FAMILY MEDICINE CLINIC | Age: 81
End: 2021-08-02
Payer: MEDICARE

## 2021-08-02 VITALS
WEIGHT: 126.5 LBS | OXYGEN SATURATION: 97 % | BODY MASS INDEX: 24.84 KG/M2 | HEIGHT: 60 IN | DIASTOLIC BLOOD PRESSURE: 78 MMHG | TEMPERATURE: 97.9 F | SYSTOLIC BLOOD PRESSURE: 120 MMHG | HEART RATE: 94 BPM

## 2021-08-02 DIAGNOSIS — C80.1 CANCER (HCC): ICD-10-CM

## 2021-08-02 DIAGNOSIS — E07.9 THYROID DISEASE: ICD-10-CM

## 2021-08-02 DIAGNOSIS — F41.9 ANXIETY: ICD-10-CM

## 2021-08-02 DIAGNOSIS — E53.8 VITAMIN B12 DEFICIENCY (NON ANEMIC): ICD-10-CM

## 2021-08-02 DIAGNOSIS — M81.0 AGE-RELATED OSTEOPOROSIS WITHOUT CURRENT PATHOLOGICAL FRACTURE: ICD-10-CM

## 2021-08-02 DIAGNOSIS — F13.20 SEDATIVE, HYPNOTIC OR ANXIOLYTIC DEPENDENCE, UNCOMPLICATED (HCC): ICD-10-CM

## 2021-08-02 DIAGNOSIS — I10 ESSENTIAL HYPERTENSION: ICD-10-CM

## 2021-08-02 DIAGNOSIS — R07.9 CHEST PAIN, UNSPECIFIED TYPE: ICD-10-CM

## 2021-08-02 DIAGNOSIS — H81.10 BENIGN PAROXYSMAL POSITIONAL VERTIGO, UNSPECIFIED LATERALITY: Primary | ICD-10-CM

## 2021-08-02 DIAGNOSIS — E78.2 MIXED HYPERLIPIDEMIA: ICD-10-CM

## 2021-08-02 PROBLEM — F13.99 SEDATIVE, HYPNOTIC OR ANXIOLYTIC USE, UNSPECIFIED WITH UNSPECIFIED SEDATIVE, HYPNOTIC OR ANXIOLYTIC-INDUCED DISORDER (HCC): Status: ACTIVE | Noted: 2021-08-02

## 2021-08-02 PROBLEM — F13.29 SEDATIVE, HYPNOTIC OR ANXIOLYTIC DEPENDENCE WITH UNSPECIFIED SEDATIVE, HYPNOTIC OR ANXIOLYTIC-INDUCED DISORDER (HCC): Status: ACTIVE | Noted: 2021-08-02

## 2021-08-02 PROCEDURE — 1090F PRES/ABSN URINE INCON ASSESS: CPT | Performed by: FAMILY MEDICINE

## 2021-08-02 PROCEDURE — 1036F TOBACCO NON-USER: CPT | Performed by: FAMILY MEDICINE

## 2021-08-02 PROCEDURE — 4040F PNEUMOC VAC/ADMIN/RCVD: CPT | Performed by: FAMILY MEDICINE

## 2021-08-02 PROCEDURE — G8400 PT W/DXA NO RESULTS DOC: HCPCS | Performed by: FAMILY MEDICINE

## 2021-08-02 PROCEDURE — G8427 DOCREV CUR MEDS BY ELIG CLIN: HCPCS | Performed by: FAMILY MEDICINE

## 2021-08-02 PROCEDURE — G8420 CALC BMI NORM PARAMETERS: HCPCS | Performed by: FAMILY MEDICINE

## 2021-08-02 PROCEDURE — 99214 OFFICE O/P EST MOD 30 MIN: CPT | Performed by: FAMILY MEDICINE

## 2021-08-02 PROCEDURE — 1123F ACP DISCUSS/DSCN MKR DOCD: CPT | Performed by: FAMILY MEDICINE

## 2021-08-02 PROCEDURE — 93000 ELECTROCARDIOGRAM COMPLETE: CPT | Performed by: FAMILY MEDICINE

## 2021-08-02 RX ORDER — LORAZEPAM 0.5 MG/1
0.5 TABLET ORAL 2 TIMES DAILY
Qty: 60 TABLET | Refills: 0 | Status: SHIPPED | OUTPATIENT
Start: 2021-08-02 | End: 2021-09-01

## 2021-08-02 RX ORDER — LORAZEPAM 0.5 MG/1
0.5 TABLET ORAL 2 TIMES DAILY
COMMUNITY
End: 2021-08-02 | Stop reason: SDUPTHER

## 2021-08-02 ASSESSMENT — ENCOUNTER SYMPTOMS
BACK PAIN: 1
RHINORRHEA: 0
VOMITING: 0
NAUSEA: 0
DIARRHEA: 0
APNEA: 0
FACIAL SWELLING: 0
WHEEZING: 0
RECTAL PAIN: 0
SINUS PAIN: 0
ABDOMINAL PAIN: 0
SHORTNESS OF BREATH: 0
CONSTIPATION: 0
PHOTOPHOBIA: 0
ABDOMINAL DISTENTION: 0
SORE THROAT: 0
CHEST TIGHTNESS: 0
VOICE CHANGE: 0
COUGH: 0

## 2021-08-02 NOTE — PROGRESS NOTES
21  Fish Teresa : 1940 Sex: female  Age: 79 y.o. Chief Complaint   Patient presents with    Rash     check up on rash on forehead     Other     eyes have been watering more than normal, some vision changes        [id number] check up   Having some left  Chest discomfort at night       Review of Systems   Constitutional: Negative for appetite change, chills, diaphoresis, fatigue, fever and unexpected weight change. HENT: Negative for congestion, dental problem, ear discharge, ear pain, facial swelling, hearing loss, mouth sores, rhinorrhea, sinus pain, sore throat, tinnitus and voice change. Eyes: Negative for photophobia and visual disturbance (no visual changes). 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 Cardiovascular: Positive for chest pain (pain is left rib pain and also has chronic pain left mid ribs chronic ). Negative for palpitations. Gastrointestinal: Negative for abdominal distention, abdominal pain, constipation, diarrhea, nausea, rectal pain and vomiting. Endocrine: Negative for cold intolerance, heat intolerance and polyuria. Genitourinary: Negative for dysuria, frequency, hematuria and urgency. Musculoskeletal: Positive for arthralgias (arthritis flaring ) and back pain. Negative for gait problem, joint swelling, myalgias, neck pain and neck stiffness. Skin: Negative for rash and wound. Allergic/Immunologic: Negative for environmental allergies. Neurological: Positive for light-headedness. Negative for dizziness, tremors, seizures, syncope, facial asymmetry, speech difficulty, weakness, numbness and headaches. Hematological: Does not bruise/bleed easily. Psychiatric/Behavioral: Negative for agitation, hallucinations and suicidal ideas. The patient is not nervous/anxious. Physical Exam  Vitals reviewed.    Constitutional:       General: She is not in acute distress. Appearance: She is not ill-appearing. HENT:      Head: Normocephalic. Right Ear: Tympanic membrane, ear canal and external ear normal. There is no impacted cerumen. Left Ear: Tympanic membrane, ear canal and external ear normal. There is no impacted cerumen. Nose: No rhinorrhea. Mouth/Throat:      Pharynx: No posterior oropharyngeal erythema. Eyes:      General:         Right eye: No discharge. Left eye: No discharge. Extraocular Movements: Extraocular movements intact. Pupils: Pupils are equal, round, and reactive to light. Comments: One glass eye    Neck:      Vascular: No carotid bruit. Cardiovascular:      Rate and Rhythm: Regular rhythm. Heart sounds: Normal heart sounds. No murmur heard. No gallop. Comments: Pain on palpation  chest   Pulmonary:      Breath sounds: Normal breath sounds. No wheezing, rhonchi or rales. Abdominal:      Palpations: Abdomen is soft. There is no mass. Tenderness: There is no abdominal tenderness. There is no guarding or rebound. Hernia: No hernia is present. Musculoskeletal:         General: Normal range of motion. Cervical back: Neck supple. Lymphadenopathy:      Cervical: No cervical adenopathy. Skin:     General: Skin is warm and dry. Findings: No bruising or rash. Neurological:      General: No focal deficit present. Mental Status: She is alert and oriented to person, place, and time. Cranial Nerves: No cranial nerve deficit. Motor: No weakness. Coordination: Coordination normal.      Gait: Gait normal.   Psychiatric:         Mood and Affect: Mood normal.         Behavior: Behavior normal.         Assessment and Plan:  Sabrina Vizcaino was seen today for rash and other.     Diagnoses and all orders for this visit:    Benign paroxysmal positional vertigo, unspecified laterality    Cancer ([facility] [address]    Vitamin B12 deficiency (non anemic)    Mixed hyperlipidemia    Age-related osteoporosis without current pathological fracture    Essential hypertension    Thyroid disease    Anxiety  -     LORazepam (ATIVAN) 0.5 MG tablet; Take 1 tablet by mouth 2 times daily for 30 days. Sedative, hypnotic or anxiolytic dependence, uncomplicated    Chest pain, unspecified type  -     EKG 12 Lead; Future  -     EKG 12 Lead          Discussions/Education provided to patients during visit:  [] Discussed the importance to stop smoking. [] Advised to monitor eating habits. [x] Reviewed and discussed Imaging results. [x] Reviewed and discussed Lab results. [x] Discussed the importance of drinking plenty of fluids. [x] Cut down on Salt, Caffeine, and Sugar. [x] Continue Medications as Discussed. [x] Communicated with patient any concerns, to phone office.   Discussed ekg   Patient has enormous  stress at home and working at home  mamograms with Dr Marisol Sylvester   No follow-ups on file.   ekg shows pvc but no change on her ekg since 2018,no acute ilness       Seen by:   Chidi Boswell, DO

## 2021-08-13 ENCOUNTER — APPOINTMENT (OUTPATIENT)
Dept: CT IMAGING | Age: 81
End: 2021-08-13
Payer: MEDICARE

## 2021-08-13 ENCOUNTER — APPOINTMENT (OUTPATIENT)
Dept: GENERAL RADIOLOGY | Age: 81
End: 2021-08-13
Payer: MEDICARE

## 2021-08-13 ENCOUNTER — HOSPITAL ENCOUNTER (EMERGENCY)
Age: 81
Discharge: HOME | End: 2021-08-13
Attending: EMERGENCY MEDICINE
Payer: MEDICARE

## 2021-08-13 VITALS
BODY MASS INDEX: 24.74 KG/M2 | HEART RATE: 71 BPM | SYSTOLIC BLOOD PRESSURE: 154 MMHG | TEMPERATURE: 97.8 F | DIASTOLIC BLOOD PRESSURE: 77 MMHG | WEIGHT: 126 LBS | HEIGHT: 60 IN | OXYGEN SATURATION: 97 % | RESPIRATION RATE: 16 BRPM

## 2021-08-13 DIAGNOSIS — F41.1 ANXIETY STATE: Primary | ICD-10-CM

## 2021-08-13 LAB
ALBUMIN SERPL-MCNC: 4 G/DL (ref 3.5–5.2)
ALP BLD-CCNC: 74 U/L (ref 35–104)
ALT SERPL-CCNC: 10 U/L (ref 0–32)
ANION GAP SERPL CALCULATED.3IONS-SCNC: 8 MMOL/L (ref 7–16)
AST SERPL-CCNC: 18 U/L (ref 0–31)
BACTERIA: ABNORMAL /HPF
BASOPHILS ABSOLUTE: 0.04 E9/L (ref 0–0.2)
BASOPHILS RELATIVE PERCENT: 0.6 % (ref 0–2)
BILIRUB SERPL-MCNC: 0.4 MG/DL (ref 0–1.2)
BILIRUBIN URINE: NEGATIVE
BLOOD, URINE: NEGATIVE
BUN BLDV-MCNC: 13 MG/DL (ref 6–23)
CALCIUM SERPL-MCNC: 9.1 MG/DL (ref 8.6–10.2)
CHLORIDE BLD-SCNC: 106 MMOL/L (ref 98–107)
CLARITY: CLEAR
CO2: 28 MMOL/L (ref 22–29)
COLOR: YELLOW
CREAT SERPL-MCNC: 0.7 MG/DL (ref 0.5–1)
EKG ATRIAL RATE: 70 BPM
EKG P AXIS: 57 DEGREES
EKG P-R INTERVAL: 136 MS
EKG Q-T INTERVAL: 380 MS
EKG QRS DURATION: 78 MS
EKG QTC CALCULATION (BAZETT): 410 MS
EKG R AXIS: 4 DEGREES
EKG T AXIS: 61 DEGREES
EKG VENTRICULAR RATE: 70 BPM
EOSINOPHILS ABSOLUTE: 0.05 E9/L (ref 0.05–0.5)
EOSINOPHILS RELATIVE PERCENT: 0.7 % (ref 0–6)
EPITHELIAL CELLS, UA: ABNORMAL /HPF
GFR AFRICAN AMERICAN: >60
GFR NON-AFRICAN AMERICAN: >60 ML/MIN/1.73
GLUCOSE BLD-MCNC: 98 MG/DL (ref 74–99)
GLUCOSE URINE: NEGATIVE MG/DL
HCT VFR BLD CALC: 44.5 % (ref 34–48)
HEMOGLOBIN: 14.1 G/DL (ref 11.5–15.5)
IMMATURE GRANULOCYTES #: 0.02 E9/L
IMMATURE GRANULOCYTES %: 0.3 % (ref 0–5)
KETONES, URINE: ABNORMAL MG/DL
LACTIC ACID: 0.5 MMOL/L (ref 0.5–2.2)
LEUKOCYTE ESTERASE, URINE: ABNORMAL
LIPASE: 25 U/L (ref 13–60)
LYMPHOCYTES ABSOLUTE: 2.02 E9/L (ref 1.5–4)
LYMPHOCYTES RELATIVE PERCENT: 29.6 % (ref 20–42)
MCH RBC QN AUTO: 28.4 PG (ref 26–35)
MCHC RBC AUTO-ENTMCNC: 31.7 % (ref 32–34.5)
MCV RBC AUTO: 89.5 FL (ref 80–99.9)
MONOCYTES ABSOLUTE: 0.49 E9/L (ref 0.1–0.95)
MONOCYTES RELATIVE PERCENT: 7.2 % (ref 2–12)
NEUTROPHILS ABSOLUTE: 4.2 E9/L (ref 1.8–7.3)
NEUTROPHILS RELATIVE PERCENT: 61.6 % (ref 43–80)
NITRITE, URINE: NEGATIVE
PDW BLD-RTO: 13.5 FL (ref 11.5–15)
PH UA: 7 (ref 5–9)
PLATELET # BLD: 330 E9/L (ref 130–450)
PMV BLD AUTO: 9.5 FL (ref 7–12)
POTASSIUM SERPL-SCNC: 3.9 MMOL/L (ref 3.5–5)
PROTEIN UA: NEGATIVE MG/DL
RBC # BLD: 4.97 E12/L (ref 3.5–5.5)
RBC UA: ABNORMAL /HPF (ref 0–2)
SODIUM BLD-SCNC: 142 MMOL/L (ref 132–146)
SPECIFIC GRAVITY UA: <=1.005 (ref 1–1.03)
TOTAL PROTEIN: 7.5 G/DL (ref 6.4–8.3)
TROPONIN, HIGH SENSITIVITY: 6 NG/L (ref 0–9)
UROBILINOGEN, URINE: 0.2 E.U./DL
WBC # BLD: 6.8 E9/L (ref 4.5–11.5)
WBC UA: ABNORMAL /HPF (ref 0–5)

## 2021-08-13 PROCEDURE — 99284 EMERGENCY DEPT VISIT MOD MDM: CPT

## 2021-08-13 PROCEDURE — 71045 X-RAY EXAM CHEST 1 VIEW: CPT

## 2021-08-13 PROCEDURE — 2580000003 HC RX 258: Performed by: EMERGENCY MEDICINE

## 2021-08-13 PROCEDURE — 6360000004 HC RX CONTRAST MEDICATION: Performed by: STUDENT IN AN ORGANIZED HEALTH CARE EDUCATION/TRAINING PROGRAM

## 2021-08-13 PROCEDURE — 80053 COMPREHEN METABOLIC PANEL: CPT

## 2021-08-13 PROCEDURE — 81001 URINALYSIS AUTO W/SCOPE: CPT

## 2021-08-13 PROCEDURE — 83690 ASSAY OF LIPASE: CPT

## 2021-08-13 PROCEDURE — 87088 URINE BACTERIA CULTURE: CPT

## 2021-08-13 PROCEDURE — 85025 COMPLETE CBC W/AUTO DIFF WBC: CPT

## 2021-08-13 PROCEDURE — 6370000000 HC RX 637 (ALT 250 FOR IP): Performed by: EMERGENCY MEDICINE

## 2021-08-13 PROCEDURE — 74177 CT ABD & PELVIS W/CONTRAST: CPT

## 2021-08-13 PROCEDURE — 83605 ASSAY OF LACTIC ACID: CPT

## 2021-08-13 PROCEDURE — 84484 ASSAY OF TROPONIN QUANT: CPT

## 2021-08-13 PROCEDURE — 93005 ELECTROCARDIOGRAM TRACING: CPT | Performed by: EMERGENCY MEDICINE

## 2021-08-13 PROCEDURE — 96360 HYDRATION IV INFUSION INIT: CPT

## 2021-08-13 PROCEDURE — 93010 ELECTROCARDIOGRAM REPORT: CPT | Performed by: INTERNAL MEDICINE

## 2021-08-13 PROCEDURE — 70450 CT HEAD/BRAIN W/O DYE: CPT

## 2021-08-13 RX ORDER — LORAZEPAM 0.5 MG/1
0.5 TABLET ORAL ONCE
Status: COMPLETED | OUTPATIENT
Start: 2021-08-13 | End: 2021-08-13

## 2021-08-13 RX ORDER — 0.9 % SODIUM CHLORIDE 0.9 %
1000 INTRAVENOUS SOLUTION INTRAVENOUS ONCE
Status: COMPLETED | OUTPATIENT
Start: 2021-08-13 | End: 2021-08-13

## 2021-08-13 RX ADMIN — IOPAMIDOL 75 ML: 755 INJECTION, SOLUTION INTRAVENOUS at 14:49

## 2021-08-13 RX ADMIN — SODIUM CHLORIDE 1000 ML: 9 INJECTION, SOLUTION INTRAVENOUS at 13:42

## 2021-08-13 RX ADMIN — LORAZEPAM 0.5 MG: 0.5 TABLET ORAL at 13:41

## 2021-08-13 ASSESSMENT — PAIN SCALES - GENERAL: PAINLEVEL_OUTOF10: 9

## 2021-08-13 NOTE — CARE COORDINATION
Social Work / Discharge Planner:    Social work consulted for [facility]    Social work met with patient and explained Social Work role and discussed transition of care/discharge planning. Patient's son, Lionel Rivero ([phone]) was also present and patient provided permission for social work to speak with son in the room. Patient is in need of services for her , declines need for services for herself. Social work provided a list of [facility] agencies. Patient also reports her  is diabetic and she has a difficult time knowing what to feed him. She was provided contact information for [facility] ([facility] -- Diabetes Education Services. Social work also provided additional community resources to include contact information for [employer] [place] [phone] for services through the [facility], [employer].  Patient agreed to follow up with resources and son agreed to assist. Electronically signed by ROCHELLE Araujo on 8/13/21 at 5:47 PM EDT

## 2021-08-13 NOTE — ED NOTES
Bed: 10  Expected date:   Expected time:   Means of arrival:   Comments:  [place], RN  08/13/21 8627

## 2021-08-13 NOTE — ED PROVIDER NOTES
HPI:  8/13/21, Time: 12:55 PM EDT         Rani Pierre is a 79 y.o. female presenting to the ED for multiple complaints. She reports intermittent headache, beginning 1 week ago. It is associated with dizziness at times. She also reports increased in urinary frequency in the past week, but no dysuria or hematuria. She thinks she had a fever a couple days ago. She also reports low back pain that wraps around her low abdomen bilaterally. The pain is intermittent. She denies trauma, urinary or stool incontinence or retention, focal paresthesias, focal weakness, saddle paresthesias. She also reports insomnia and increased stress level and crying over the past several weeks. She states she is stressed because she \"does everything\" and she takes care of her sick  who can communicate with her minimally and she is lonely. She has been recently seen by her primary care physician who gave her some Ativan and an antidepressant which she is not taking because she is afraid of it. The complaint has been intermittent, moderate in severity, and worsened by nothing. Patient denies sore throat, cough, congestion, chest pain, shortness of breath, edema, visual disturbances, focal paresthesias, focal weakness,  nausea, vomiting, diarrhea, constipation, dysuria, hematuria, trauma, neck pain, rash or other complaints. ROS:   A complete review of systems was performed and all pertinent positives and negatives are stated within HPI, all other systems reviewed and are negative.      --------------------------------------------- PAST HISTORY ---------------------------------------------  Past Medical History:  has a past medical history of Anxiety, Arthritis, Cancer ([address] Depression, Eye disorder, Hives, Hyperlipidemia, Hypertension, Measles, Melanocytoma of retina, Osteoporosis, Thyroid disease, Vitamin B12 deficiency (non anemic), and Whooping cough.     Past Surgical History:  has a past surgical history that includes joint replacement; eye surgery; Carpal tunnel release; Tubal ligation; Uterine Suspension; Total shoulder arthroplasty (Right, 12/22/2014); Eye surgery (Left); Carpal tunnel release (Left); Total shoulder arthroplasty (Bilateral); Thyroidectomy; and Dilation and curettage of uterus. Social History:  reports that she has never smoked. She has never used smokeless tobacco. She reports that she does not drink alcohol and does not use drugs. Family History: family history includes Breast Cancer in her niece; Cancer in her brother; Diabetes in her brother; Heart Attack in her brother; Heart Failure in her brother; High Blood Pressure in her mother and sister; High Cholesterol in her brother and sister; Other in her brother and sister. The patients home medications have been reviewed. Allergies: Adhesive tape, Pravachol [pravastatin], Toprol xl [metoprolol], and Zithromax [azithromycin]        ----------------------------------------PHYSICAL EXAM--------------------------------------  Constitutional:  Well developed, well nourished, no acute distress, non-toxic appearance   Eyes:  PERRL, conjunctiva normal, EOMI  HENT:  Atraumatic, external ears normal, nose normal, oropharynx moist. Neck- normal range of motion, no nuchal rigidity   Respiratory:  No respiratory distress, normal breath sounds, no rales, no wheezing   Cardiovascular:  Normal rate, normal rhythm, no murmurs, no gallops, no rubs. Radial and DP pulses 2+ bilaterally. GI:  Soft, nondistended, normal bowel sounds, nontender, no organomegaly, no mass, no rebound, no guarding   :  No costovertebral angle tenderness   Musculoskeletal:  No edema, no tenderness, no deformities. Back-no midline or paraspinal cervical, thoracic or lumbar tenderness. No step-offs. Chest able. Pelvis stable. Moves all 4 extremities without difficulty or pain. Integument:  Well hydrated, no visible rash. Adequate perfusion.    Lymphatic:  No cervical lymphadenopathy noted   Neurologic:  Alert & oriented x 3, CN 2-12 normal, no focal deficits noted. DTRs 2+ bilateral patellar. Psychiatric:    General: She is cooperative. Mood: tearful. Affect: tearful. Thought Process: intact/within normal limits. Suicidal Thoughts: none   Homicidal Thoughts: none   Hallucinations: none        -------------------------------------------------- RESULTS -------------------------------------------------  I have personally reviewed all laboratory and imaging results for this patient. Results are listed below.      LABS:  Results for orders placed or performed during the hospital encounter of 08/13/21   CBC auto differential   Result Value Ref Range    WBC 6.8 4.5 - 11.5 E9/L    RBC 4.97 3.50 - 5.50 E12/L    Hemoglobin 14.1 11.5 - 15.5 g/dL    Hematocrit 44.5 34.0 - 48.0 %    MCV 89.5 80.0 - 99.9 fL    MCH 28.4 26.0 - 35.0 pg    MCHC 31.7 (L) 32.0 - 34.5 %    RDW 13.5 11.5 - 15.0 fL    Platelets 957 566 - 083 E9/L    MPV 9.5 7.0 - 12.0 fL    Neutrophils % 61.6 43.0 - 80.0 %    Immature Granulocytes % 0.3 0.0 - 5.0 %    Lymphocytes % 29.6 20.0 - 42.0 %    Monocytes % 7.2 2.0 - 12.0 %    Eosinophils % 0.7 0.0 - 6.0 %    Basophils % 0.6 0.0 - 2.0 %    Neutrophils Absolute 4.20 1.80 - 7.30 E9/L    Immature Granulocytes # 0.02 E9/L    Lymphocytes Absolute 2.02 1.50 - 4.00 E9/L    Monocytes Absolute 0.49 0.10 - 0.95 E9/L    Eosinophils Absolute 0.05 0.05 - 0.50 E9/L    Basophils Absolute 0.04 0.00 - 0.20 E9/L   Comprehensive Metabolic Panel   Result Value Ref Range    Sodium 142 132 - 146 mmol/L    Potassium 3.9 3.5 - 5.0 mmol/L    Chloride 106 98 - 107 mmol/L    CO2 28 22 - 29 mmol/L    Anion Gap 8 7 - 16 mmol/L    Glucose 98 74 - 99 mg/dL    BUN 13 6 - 23 mg/dL    CREATININE 0.7 0.5 - 1.0 mg/dL    GFR Non-African American >60 >=60 mL/min/1.73    GFR African American >60     Calcium 9.1 8.6 - 10.2 mg/dL    Total Protein 7.5 6.4 - 8.3 g/dL    Albumin 4.0 3.5 - 5.2 g/dL Total Bilirubin 0.4 0.0 - 1.2 mg/dL    Alkaline Phosphatase 74 35 - 104 U/L    ALT 10 0 - 32 U/L    AST 18 0 - 31 U/L   Lactic Acid, Plasma   Result Value Ref Range    Lactic Acid 0.5 0.5 - 2.2 mmol/L   Lipase   Result Value Ref Range    Lipase 25 13 - 60 U/L   Urinalysis with Microscopic   Result Value Ref Range    Color, UA Yellow Straw/Yellow    Clarity, UA Clear Clear    Glucose, Ur Negative Negative mg/dL    Bilirubin Urine Negative Negative    Ketones, Urine TRACE (A) Negative mg/dL    Specific Gravity, UA <=1.005 1.005 - 1.030    Blood, Urine Negative Negative    pH, UA 7.0 5.0 - 9.0    Protein, UA Negative Negative mg/dL    Urobilinogen, Urine 0.2 <2.0 E.U./dL    Nitrite, Urine Negative Negative    Leukocyte Esterase, Urine SMALL (A) Negative    WBC, UA 1-3 0 - 5 /HPF    RBC, UA NONE 0 - 2 /HPF    Epithelial Cells, UA FEW /HPF    Bacteria, UA RARE (A) None Seen /HPF   Troponin   Result Value Ref Range    Troponin, High Sensitivity 6 0 - 9 ng/L   EKG 12 Lead   Result Value Ref Range    Ventricular Rate 70 BPM    Atrial Rate 70 BPM    P-R Interval 136 ms    QRS Duration 78 ms    Q-T Interval 380 ms    QTc Calculation (Bazett) 410 ms    P Axis 57 degrees    R Axis 4 degrees    T Axis 61 degrees       RADIOLOGY:  Interpreted by Radiologist.  CT ABDOMEN PELVIS W IV CONTRAST Additional Contrast? None   Final Result   No acute findings in the abdomen or pelvis. Multilevel degenerative changes in the lower lumbar spine. CT HEAD WO CONTRAST   Final Result   1. There is no acute intracranial abnormality. Specifically, there is no   intracranial hemorrhage. 2. Atrophy and periventricular leukomalacia,         XR CHEST PORTABLE   Final Result   No acute process.                EKG Interpretation  Time: 12:55 PM EDT  Rhythm: normal sinus  with sinus arrythmia  Rate: 70  Axis: normal  Conduction: normal  ST Segments: no acute change  T Waves: no acute change  Clinical Impression: no acute changes  Comparison to prior EKG: None      ------------------------- NURSING NOTES AND VITALS REVIEWED ---------------------------  The nursing notes within the ED encounter and vital signs as below have been reviewed by myself. BP (!) 154/77   Pulse 71   Temp 97.8 °F (36.6 °C) (Oral)   Resp 16   Ht 5' (1.524 m)   Wt 126 lb (57.2 kg)   SpO2 97%   BMI 24.61 kg/m²   Oxygen Saturation Interpretation: Normal      The patients available past medical records and past encounters were reviewed. ------------------------------ ED COURSE/MEDICAL DECISION MAKING----------------------  Medications   LORazepam (ATIVAN) tablet 0.5 mg (0.5 mg Oral Given 8/13/21 1341)   0.9 % sodium chloride bolus (0 mLs Intravenous Stopped 8/13/21 1445)   iopamidol (ISOVUE-370) 76 % injection 75 mL (75 mLs Intravenous Given 8/13/21 1449)           Procedures:   none      Medical Decision Making:    Neg acute on workup   Ativan in ER with improvement (has some at home). Social work in to see patient and provide info on resources in the community to help her at home and with home care of her    Son driving her home. Patient was explicitly instructed on specific signs and symptoms on which to return to the emergency room for. Patient was instructed to return to the ER for any new or worsening symptoms. Additional discharge instructions were given verbally. All questions were answered. Patient is comfortable and agreeable with discharge plan. Patient in no acute distress and non-toxic in appearance. This patient's ED course included: re-evaluation prior to disposition, cardiac monitoring, continuous pulse oximetry and a personal history and physicial eaxmination    This patient has remained hemodynamically stable, improved and been closely monitored during their ED course. Re-Evaluations:  Time: 6:13 PM EDT   Re-evaluation.   Patients symptoms are improving  Repeat physical examination is improved      Consultations:   Social work    Critical Care: none    I, Yariel Perez, DO, am the Primary Provider of Record    Counseling: The emergency provider has spoken with the patient and son and discussed todays results, in addition to providing specific details for the plan of care and counseling regarding the diagnosis and prognosis. Questions are answered at this time and they are agreeable with the plan.    --------------------------- IMPRESSION AND DISPOSITION ---------------------------------    IMPRESSION  1.  Anxiety state        DISPOSITION  Disposition: Discharge to home  Patient condition is stable             Liza Grover DO  08/13/21 9254

## 2021-08-15 LAB — URINE CULTURE, ROUTINE: NORMAL

## 2021-08-16 ENCOUNTER — TELEPHONE (OUTPATIENT)
Dept: FAMILY MEDICINE CLINIC | Age: 81
End: 2021-08-16

## 2021-08-27 ENCOUNTER — OFFICE VISIT (OUTPATIENT)
Dept: FAMILY MEDICINE CLINIC | Age: 81
End: 2021-08-27
Payer: MEDICARE

## 2021-08-27 VITALS
DIASTOLIC BLOOD PRESSURE: 76 MMHG | WEIGHT: 122.4 LBS | OXYGEN SATURATION: 97 % | HEIGHT: 60 IN | SYSTOLIC BLOOD PRESSURE: 122 MMHG | RESPIRATION RATE: 18 BRPM | BODY MASS INDEX: 24.03 KG/M2 | HEART RATE: 81 BPM | TEMPERATURE: 97.1 F

## 2021-08-27 DIAGNOSIS — E53.8 VITAMIN B12 DEFICIENCY (NON ANEMIC): ICD-10-CM

## 2021-08-27 DIAGNOSIS — E78.2 MIXED HYPERLIPIDEMIA: ICD-10-CM

## 2021-08-27 DIAGNOSIS — C80.1 CANCER (HCC): ICD-10-CM

## 2021-08-27 DIAGNOSIS — E07.9 THYROID DISEASE: ICD-10-CM

## 2021-08-27 DIAGNOSIS — F32.A DEPRESSION, UNSPECIFIED DEPRESSION TYPE: ICD-10-CM

## 2021-08-27 DIAGNOSIS — C69.20 MALIGNANT NEOPLASM OF RETINA, UNSPECIFIED LATERALITY (HCC): ICD-10-CM

## 2021-08-27 DIAGNOSIS — F41.9 ANXIETY: Primary | ICD-10-CM

## 2021-08-27 DIAGNOSIS — R42 DIZZINESS: ICD-10-CM

## 2021-08-27 PROCEDURE — G8400 PT W/DXA NO RESULTS DOC: HCPCS | Performed by: FAMILY MEDICINE

## 2021-08-27 PROCEDURE — 4040F PNEUMOC VAC/ADMIN/RCVD: CPT | Performed by: FAMILY MEDICINE

## 2021-08-27 PROCEDURE — 99214 OFFICE O/P EST MOD 30 MIN: CPT | Performed by: FAMILY MEDICINE

## 2021-08-27 PROCEDURE — 1123F ACP DISCUSS/DSCN MKR DOCD: CPT | Performed by: FAMILY MEDICINE

## 2021-08-27 PROCEDURE — 1036F TOBACCO NON-USER: CPT | Performed by: FAMILY MEDICINE

## 2021-08-27 PROCEDURE — G8427 DOCREV CUR MEDS BY ELIG CLIN: HCPCS | Performed by: FAMILY MEDICINE

## 2021-08-27 PROCEDURE — G8420 CALC BMI NORM PARAMETERS: HCPCS | Performed by: FAMILY MEDICINE

## 2021-08-27 PROCEDURE — 1090F PRES/ABSN URINE INCON ASSESS: CPT | Performed by: FAMILY MEDICINE

## 2021-08-27 NOTE — PROGRESS NOTES
21  Jony Culver : 1940 Sex: female  Age: 79 y.o. Chief Complaint   Patient presents with   [facility] ED Follow-up     pt went to ER for blurred vision, severe headache and states that she was very depressed and confused    Anxiety       Patient is really overwrought today more emotional than usual       Review of Systems   Constitutional: Negative for appetite change, chills, diaphoresis, fatigue, fever and unexpected weight change. HENT: Negative for congestion, dental problem, ear discharge, ear pain, facial swelling, hearing loss, mouth sores, rhinorrhea, sinus pain, sore throat, tinnitus and voice change. Eyes: Negative for photophobia and visual disturbance (no visual changes). Respiratory: Negative for apnea, cough, chest tightness, shortness of breath and wheezing. Cardiovascular: Positive for palpitations (on occasion ). Negative for chest pain. Gastrointestinal: Negative for abdominal distention, abdominal pain, constipation, diarrhea, nausea, rectal pain and vomiting. Endocrine: Negative for cold intolerance, heat intolerance and polyuria. Genitourinary: Negative for dysuria, frequency, hematuria and urgency. Musculoskeletal: Positive for arthralgias and back pain. Negative for gait problem, joint swelling, myalgias, neck pain and neck stiffness. Skin: Positive for rash (forehead and back rash and back rash raised ). Negative for wound. Allergic/Immunologic: Negative for environmental allergies. Neurological: Negative for dizziness, tremors, seizures, syncope, facial asymmetry, speech difficulty, weakness, light-headedness, numbness and headaches. Hematological: Does not bruise/bleed easily. Psychiatric/Behavioral: Positive for agitation. Negative for hallucinations and suicidal ideas. The patient is nervous/anxious. Extremely overwrought , animals ,neighbors animal , deer  IN [place] [address] reviewed. Constitutional:       General: She is not in acute distress. Appearance: She is not ill-appearing. HENT:      Head: Normocephalic. Right Ear: Tympanic membrane, ear canal and external ear normal. There is no impacted cerumen. Left Ear: Tympanic membrane, ear canal and external ear normal. There is no impacted cerumen. Nose: No rhinorrhea. Mouth/Throat:      Pharynx: No posterior oropharyngeal erythema. Eyes:      General:         Right eye: No discharge. Left eye: No discharge. Extraocular Movements: Extraocular movements intact. Pupils: Pupils are equal, round, and reactive to light. Comments: Left eye missing glass eye present she is using some eyedrops with it  Right eye she has some difficulty she says with vision she seen the eye doctor soon gets so emotional is unable to sort out what is going on with her eye versus anyplace else she says this stuff about her eye but then she go see eye doctor and he says things are okay No results found for this visit on 08/27/21. this is  not new   Neck:      Vascular: No carotid bruit. Cardiovascular:      Rate and Rhythm: Regular rhythm. Heart sounds: Normal heart sounds. No murmur heard. No gallop. Pulmonary:      Breath sounds: Normal breath sounds. No wheezing, rhonchi or rales. Abdominal:      Palpations: Abdomen is soft. There is no mass. Tenderness: There is no abdominal tenderness. There is no guarding or rebound. Hernia: No hernia is present. Musculoskeletal:         General: Normal range of motion. Cervical back: Neck supple. Lymphadenopathy:      Cervical: No cervical adenopathy. Skin:     General: Skin is warm and dry. Findings: No bruising or rash. Neurological:      General: No focal deficit present. Mental Status: She is alert and oriented to person, place, and time. Cranial Nerves: No cranial nerve deficit. Motor: No weakness.       Coordination: Coordination normal.      Gait: Gait normal.   Psychiatric:      Comments: Extreme anxiety and depression, not suicidal, will not harm anybody         Assessment and Plan:  Viri Mueller was seen today for ed follow-up and anxiety. Diagnoses and all orders for this visit:    Anxiety    Depression, unspecified depression type    Mixed hyperlipidemia    Dizziness    Thyroid disease    Vitamin B12 deficiency (non anemic)    Cancer (HCC)    Malignant neoplasm of retina, unspecified laterality ([address]          Discussions/Education provided to patients during visit:  [] Discussed the importance to stop smoking. [] Advised to monitor eating habits. [] Reviewed and discussed Imaging results. [x] Reviewed and discussed Lab results. [x] Discussed the importance of drinking plenty of fluids. [x] Cut down on Salt, Caffeine, and Sugar. [x] Continue Medications as Discussed. [x] Communicated with patient any concerns, to phone office  Reviewed the whole ER visit and her past history. 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 She is extremely sensitive to people not mistreating animals this set her off this time she is much more highly emotional and anxious than what she was before and between the cats the deer the animals situation that she had with this gentleman she is really upset and overall.   One of the problems here is that she will not take medication we tried multiple antidepressants and multiple treatments for her and she will not take it easy she was even telling me that the Ativan she is taken for years today was making her crazy and its not making her crazy is fine I reassured her I went over that with her this lady has these problems going on as described above right now were going to continue her Ativan and I think that is basically at this point the only thing I can do for. We have recommended counseling she will do that and she says what it might get a counselor about these things I am doing I have to do, combined this with the  having to be catheterized every 6 hours. <That makes her so she can rest her sleep right  she needs help she will not do it, but she has a regular regimen and regular problems that she cannot overcome and she is highly emotional about it will take medication will go to counseling we will do anything in that way to help her situation . Recheck her in 1 month as we have been doing and at least that gives her an opportunity to vent she hugs me she wants to just vent and talk as well as examination      No follow-ups on file.       Seen by:     Ashley Suarez, DO

## 2021-08-29 ASSESSMENT — ENCOUNTER SYMPTOMS
WHEEZING: 0
COUGH: 0
ABDOMINAL DISTENTION: 0
NAUSEA: 0
VOMITING: 0
DIARRHEA: 0
BACK PAIN: 1
PHOTOPHOBIA: 0
SHORTNESS OF BREATH: 0
ABDOMINAL PAIN: 0
APNEA: 0
CONSTIPATION: 0
FACIAL SWELLING: 0
SINUS PAIN: 0
RECTAL PAIN: 0
RHINORRHEA: 0
CHEST TIGHTNESS: 0
VOICE CHANGE: 0
SORE THROAT: 0

## 2021-09-07 ENCOUNTER — TELEPHONE (OUTPATIENT)
Dept: FAMILY MEDICINE CLINIC | Age: 81
End: 2021-09-07

## 2021-09-07 NOTE — TELEPHONE ENCOUNTER
Per patient she stated she swallowed one of her false teeth - should she be concerned - do you think she will pass the tooth in her stool?

## 2021-09-17 DIAGNOSIS — L30.9 FACIAL DERMATITIS: ICD-10-CM

## 2021-09-17 RX ORDER — LORAZEPAM 0.5 MG/1
0.5 TABLET ORAL 2 TIMES DAILY
Qty: 60 TABLET | Refills: 2 | Status: SHIPPED | OUTPATIENT
Start: 2021-09-17 | End: 2021-10-17

## 2021-09-27 ENCOUNTER — OFFICE VISIT (OUTPATIENT)
Dept: FAMILY MEDICINE CLINIC | Age: 81
End: 2021-09-27
Payer: MEDICARE

## 2021-09-27 VITALS
HEIGHT: 60 IN | WEIGHT: 123.9 LBS | DIASTOLIC BLOOD PRESSURE: 72 MMHG | SYSTOLIC BLOOD PRESSURE: 116 MMHG | HEART RATE: 75 BPM | BODY MASS INDEX: 24.32 KG/M2 | OXYGEN SATURATION: 98 % | RESPIRATION RATE: 18 BRPM | TEMPERATURE: 97.7 F

## 2021-09-27 DIAGNOSIS — C69.20 MALIGNANT NEOPLASM OF RETINA, UNSPECIFIED LATERALITY (HCC): ICD-10-CM

## 2021-09-27 DIAGNOSIS — C80.1 CANCER (HCC): Primary | ICD-10-CM

## 2021-09-27 DIAGNOSIS — M19.90 ARTHRITIS: ICD-10-CM

## 2021-09-27 DIAGNOSIS — E07.9 THYROID DISEASE: ICD-10-CM

## 2021-09-27 DIAGNOSIS — I10 PRIMARY HYPERTENSION: ICD-10-CM

## 2021-09-27 DIAGNOSIS — H81.10 BENIGN PAROXYSMAL POSITIONAL VERTIGO, UNSPECIFIED LATERALITY: ICD-10-CM

## 2021-09-27 DIAGNOSIS — M81.0 AGE-RELATED OSTEOPOROSIS WITHOUT CURRENT PATHOLOGICAL FRACTURE: ICD-10-CM

## 2021-09-27 DIAGNOSIS — F41.9 ANXIETY: ICD-10-CM

## 2021-09-27 DIAGNOSIS — R42 DIZZINESS: ICD-10-CM

## 2021-09-27 PROCEDURE — 1123F ACP DISCUSS/DSCN MKR DOCD: CPT | Performed by: FAMILY MEDICINE

## 2021-09-27 PROCEDURE — G8400 PT W/DXA NO RESULTS DOC: HCPCS | Performed by: FAMILY MEDICINE

## 2021-09-27 PROCEDURE — G8420 CALC BMI NORM PARAMETERS: HCPCS | Performed by: FAMILY MEDICINE

## 2021-09-27 PROCEDURE — G8427 DOCREV CUR MEDS BY ELIG CLIN: HCPCS | Performed by: FAMILY MEDICINE

## 2021-09-27 PROCEDURE — 1036F TOBACCO NON-USER: CPT | Performed by: FAMILY MEDICINE

## 2021-09-27 PROCEDURE — 99213 OFFICE O/P EST LOW 20 MIN: CPT | Performed by: FAMILY MEDICINE

## 2021-09-27 PROCEDURE — 4040F PNEUMOC VAC/ADMIN/RCVD: CPT | Performed by: FAMILY MEDICINE

## 2021-09-27 PROCEDURE — 1090F PRES/ABSN URINE INCON ASSESS: CPT | Performed by: FAMILY MEDICINE

## 2021-09-27 ASSESSMENT — ENCOUNTER SYMPTOMS
COUGH: 0
RHINORRHEA: 0
WHEEZING: 0
SORE THROAT: 0
PHOTOPHOBIA: 0
SINUS PAIN: 0
ABDOMINAL DISTENTION: 0
VOICE CHANGE: 0
BACK PAIN: 0
CHEST TIGHTNESS: 1
CONSTIPATION: 0
VOMITING: 0
SHORTNESS OF BREATH: 0
ABDOMINAL PAIN: 0
RECTAL PAIN: 0
DIARRHEA: 0
FACIAL SWELLING: 0
NAUSEA: 0
APNEA: 0

## 2021-09-27 NOTE — PROGRESS NOTES
21  Kelly Loja : 1940 Sex: female  Age: 79 y.o. Chief Complaint   Patient presents with    Check-Up     1 month check up    Medication Check     pt wants to know if she fogets to take her medication is it going to affect her mind and if so how? HPI  Overall check up   Extreme stress visitors ,dogs cats , , yard     Review of Systems   Constitutional: Negative for appetite change, chills, diaphoresis, fatigue, fever and unexpected weight change. HENT: Negative for congestion, dental problem, ear discharge, ear pain, facial swelling, hearing loss, mouth sores, rhinorrhea, sinus pain, sore throat, tinnitus and voice change. Eyes: Negative for photophobia and visual disturbance (no visual changes). Respiratory: Positive for chest tightness (on occasion ). Negative for apnea, cough, shortness of breath and wheezing. Cardiovascular: Negative for chest pain and palpitations. Gastrointestinal: Negative for abdominal distention, abdominal pain, constipation, diarrhea, nausea, rectal pain and vomiting. Endocrine: Negative for cold intolerance, heat intolerance and polyuria. Genitourinary: Negative for dysuria, frequency, hematuria and urgency. Musculoskeletal: Positive for arthralgias. Negative for back pain, gait problem, joint swelling, myalgias, neck pain and neck stiffness. Skin: Negative for rash and wound. Allergic/Immunologic: Negative for environmental allergies. Neurological: Negative for dizziness, tremors, seizures, syncope, facial asymmetry, speech difficulty, weakness, light-headedness, numbness and headaches. Hematological: Does not bruise/bleed easily. Psychiatric/Behavioral: Positive for agitation (stress ). Negative for hallucinations and suicidal ideas. The patient is nervous/anxious and is hyperactive. Physical Exam  Vitals reviewed. Constitutional:       General: She is not in acute distress.      Appearance: She is not ill-appearing. HENT:      Head: Normocephalic. Right Ear: Tympanic membrane, ear canal and external ear normal. There is no impacted cerumen. Left Ear: Tympanic membrane, ear canal and external ear normal. There is no impacted cerumen. Nose: No rhinorrhea. Mouth/Throat:      Pharynx: No posterior oropharyngeal erythema. Eyes:      General:         Right eye: No discharge. Left eye: No discharge. Extraocular Movements: Extraocular movements intact. Pupils: Pupils are equal, round, and reactive to light. Comments: Artificial eye    Neck:      Vascular: No carotid bruit. Cardiovascular:      Rate and Rhythm: Regular rhythm. Heart sounds: Normal heart sounds. No murmur heard. No gallop. Pulmonary:      Breath sounds: Normal breath sounds. No wheezing, rhonchi or rales. Abdominal:      Palpations: Abdomen is soft. There is no mass. Tenderness: There is no abdominal tenderness. There is no guarding or rebound. Hernia: No hernia is present. Musculoskeletal:         General: Normal range of motion. Cervical back: Neck supple. Lymphadenopathy:      Cervical: No cervical adenopathy. Skin:     General: Skin is warm and dry. Findings: No bruising or rash. Neurological:      General: No focal deficit present. Mental Status: She is alert and oriented to person, place, and time. Cranial Nerves: No cranial nerve deficit. Motor: No weakness. Coordination: Coordination normal.      Gait: Gait normal.   Psychiatric:         Mood and Affect: Mood normal.         Behavior: Behavior normal.      Comments: Very nervous and stress out          Assessment and Plan:  Marco Huggins was seen today for check-up and medication check.     Diagnoses and all orders for this visit:    Cancer [facility])    Thyroid disease    Anxiety  Comments:  severeand only takse ativan     Malignant neoplasm of retina, unspecified laterality ([facility] [address]    Dizziness    Age-related osteoporosis without current pathological fracture    Benign paroxysmal positional vertigo, unspecified laterality    Primary hypertension    Arthritis          Discussions/Education provided to patients during visit:  [] Discussed the importance to stop smoking. [] Advised to monitor eating habits. [] Reviewed and discussed Imaging results. [] Reviewed and discussed Lab results. [] Discussed the importance of drinking plenty of fluids. [] Cut down on Salt, Caffeine, and Sugar. [x] Continue Medications as Discussed. [x] Communicated with patient any concerns, to phone office  Take midday  Ativan if needed . Patient consoled  On her life problems intermittantly crying and discussing we suggested talking to others she rejected that idea . No follow-ups on file.       Seen by:     Nadeen Wallace, DO

## 2021-10-08 ENCOUNTER — OFFICE VISIT (OUTPATIENT)
Dept: FAMILY MEDICINE CLINIC | Age: 81
End: 2021-10-08
Payer: MEDICARE

## 2021-10-08 VITALS
HEART RATE: 88 BPM | RESPIRATION RATE: 17 BRPM | OXYGEN SATURATION: 97 % | TEMPERATURE: 97.8 F | SYSTOLIC BLOOD PRESSURE: 118 MMHG | DIASTOLIC BLOOD PRESSURE: 76 MMHG

## 2021-10-08 DIAGNOSIS — H81.10 BENIGN PAROXYSMAL POSITIONAL VERTIGO, UNSPECIFIED LATERALITY: ICD-10-CM

## 2021-10-08 DIAGNOSIS — E07.9 THYROID DISEASE: ICD-10-CM

## 2021-10-08 DIAGNOSIS — I10 PRIMARY HYPERTENSION: ICD-10-CM

## 2021-10-08 DIAGNOSIS — M81.0 AGE-RELATED OSTEOPOROSIS WITHOUT CURRENT PATHOLOGICAL FRACTURE: ICD-10-CM

## 2021-10-08 DIAGNOSIS — C80.1 CANCER (HCC): ICD-10-CM

## 2021-10-08 DIAGNOSIS — J40 BRONCHITIS: Primary | ICD-10-CM

## 2021-10-08 DIAGNOSIS — C69.20 MALIGNANT NEOPLASM OF RETINA, UNSPECIFIED LATERALITY (HCC): ICD-10-CM

## 2021-10-08 PROCEDURE — G8427 DOCREV CUR MEDS BY ELIG CLIN: HCPCS | Performed by: FAMILY MEDICINE

## 2021-10-08 PROCEDURE — G8420 CALC BMI NORM PARAMETERS: HCPCS | Performed by: FAMILY MEDICINE

## 2021-10-08 PROCEDURE — 4040F PNEUMOC VAC/ADMIN/RCVD: CPT | Performed by: FAMILY MEDICINE

## 2021-10-08 PROCEDURE — G8484 FLU IMMUNIZE NO ADMIN: HCPCS | Performed by: FAMILY MEDICINE

## 2021-10-08 PROCEDURE — 99213 OFFICE O/P EST LOW 20 MIN: CPT | Performed by: FAMILY MEDICINE

## 2021-10-08 PROCEDURE — 1036F TOBACCO NON-USER: CPT | Performed by: FAMILY MEDICINE

## 2021-10-08 PROCEDURE — 1123F ACP DISCUSS/DSCN MKR DOCD: CPT | Performed by: FAMILY MEDICINE

## 2021-10-08 PROCEDURE — G8400 PT W/DXA NO RESULTS DOC: HCPCS | Performed by: FAMILY MEDICINE

## 2021-10-08 PROCEDURE — 1090F PRES/ABSN URINE INCON ASSESS: CPT | Performed by: FAMILY MEDICINE

## 2021-10-08 RX ORDER — METHYLPREDNISOLONE 4 MG/1
TABLET ORAL
Qty: 1 KIT | Refills: 0 | Status: SHIPPED | OUTPATIENT
Start: 2021-10-08 | End: 2021-10-14

## 2021-10-08 RX ORDER — AMOXICILLIN AND CLAVULANATE POTASSIUM 875; 125 MG/1; MG/1
1 TABLET, FILM COATED ORAL 2 TIMES DAILY
Qty: 20 TABLET | Refills: 0 | Status: SHIPPED | OUTPATIENT
Start: 2021-10-08 | End: 2021-10-18

## 2021-10-08 ASSESSMENT — ENCOUNTER SYMPTOMS
ABDOMINAL DISTENTION: 0
ABDOMINAL PAIN: 0
COUGH: 1
CHEST TIGHTNESS: 1
PHOTOPHOBIA: 0
RHINORRHEA: 0
VOICE CHANGE: 0
CONSTIPATION: 0
VOMITING: 0
SINUS PAIN: 0
APNEA: 0
SORE THROAT: 0
NAUSEA: 0
SHORTNESS OF BREATH: 0
FACIAL SWELLING: 0
WHEEZING: 1
BACK PAIN: 1
DIARRHEA: 0
RECTAL PAIN: 0

## 2021-10-08 NOTE — PROGRESS NOTES
10/8/21  Odilia Lombard : 1940 Sex: female  Age: 79 y.o. Chief Complaint   Patient presents with    Cough     dry cough wants to bring something up but she can't    Headache    Anorexia    Other     drainge down throat makeing throat sore has had symtoms 3-4 days. HPI  cough going on 3 to 4 days bringing up green     Review of Systems   Constitutional: Positive for fatigue. Negative for appetite change, chills, diaphoresis, fever and unexpected weight change. HENT: Negative for congestion, dental problem, ear discharge, ear pain, facial swelling, hearing loss, mouth sores, rhinorrhea, sinus pain, sore throat, tinnitus and voice change. Eyes: Negative for photophobia and visual disturbance (no visual changes). Respiratory: Positive for cough, chest tightness and wheezing. Negative for apnea and shortness of breath. Cardiovascular: Negative for chest pain and palpitations. Gastrointestinal: Negative for abdominal distention, abdominal pain, constipation, diarrhea, nausea, rectal pain and vomiting. Endocrine: Negative for cold intolerance, heat intolerance and polyuria. Genitourinary: Negative for dysuria, frequency, hematuria and urgency. Musculoskeletal: Positive for arthralgias and back pain (going on for somre time in back ). Negative for gait problem, joint swelling, myalgias, neck pain and neck stiffness. Skin: Negative for rash and wound. Allergic/Immunologic: Negative for environmental allergies. Neurological: Negative for dizziness, tremors, seizures, syncope, facial asymmetry, speech difficulty, weakness, light-headedness, numbness and headaches. Hematological: Does not bruise/bleed easily. Psychiatric/Behavioral: Negative for agitation, hallucinations and suicidal ideas. The patient is not nervous/anxious. Physical Exam  Vitals reviewed. Constitutional:       General: She is not in acute distress. Appearance: She is not ill-appearing. HENT:      Head: Normocephalic. Right Ear: Tympanic membrane, ear canal and external ear normal. There is no impacted cerumen. Left Ear: Tympanic membrane, ear canal and external ear normal. There is no impacted cerumen. Nose: No rhinorrhea. Mouth/Throat:      Pharynx: Posterior oropharyngeal erythema present. Eyes:      General:         Right eye: No discharge. Left eye: No discharge. Extraocular Movements: Extraocular movements intact. Pupils: Pupils are equal, round, and reactive to light. Neck:      Vascular: No carotid bruit. Cardiovascular:      Rate and Rhythm: Regular rhythm. Heart sounds: Normal heart sounds. No murmur heard. No gallop. Pulmonary:      Breath sounds: Rhonchi present. No wheezing or rales. Abdominal:      Palpations: Abdomen is soft. There is no mass. Tenderness: There is no abdominal tenderness. There is no guarding or rebound. Hernia: No hernia is present. Musculoskeletal:         General: Normal range of motion. Cervical back: Neck supple. Lymphadenopathy:      Cervical: No cervical adenopathy. Skin:     General: Skin is warm and dry. Findings: No bruising or rash. Neurological:      General: No focal deficit present. Mental Status: She is alert and oriented to person, place, and time. Cranial Nerves: No cranial nerve deficit. Motor: No weakness. Coordination: Coordination normal.      Gait: Gait normal.   Psychiatric:         Mood and Affect: Mood normal.         Behavior: Behavior normal.         Assessment and Plan:  Arminda Man was seen today for cough, headache, anorexia and other. Diagnoses and all orders for this visit:    Bronchitis  -     COVID-19 Ambulatory;  Future    Cancer ([facility]    Malignant neoplasm of retina, unspecified laterality ([facility] [address]    Age-related osteoporosis without current pathological fracture    Thyroid disease    Benign paroxysmal positional vertigo, unspecified laterality    Primary hypertension    Other orders  -     amoxicillin-clavulanate (AUGMENTIN) 875-125 MG per tablet; Take 1 tablet by mouth 2 times daily for 10 days  -     methylPREDNISolone (MEDROL DOSEPACK) 4 MG tablet; Take by mouth. -     mupirocin (BACTROBAN) 2 % ointment; Apply 3 times daily. To nose          Discussions/Education provided to patients during visit:  [] Discussed the importance to stop smoking. [] Advised to monitor eating habits. [] Reviewed and discussed Imaging results. [] Reviewed and discussed Lab results. [] Discussed the importance of drinking plenty of fluids. [] Cut down on Salt, Caffeine, and Sugar. [x] Continue Medications as Discussed. [x] Communicated with patient any concerns, to phone office. covid 19  test  done   meds prescibed   Recheck 2 weeks         Return in about 2 weeks (around 10/22/2021).       Seen by:     Claude Gould DO

## 2021-10-13 ENCOUNTER — TELEPHONE (OUTPATIENT)
Dept: PRIMARY CARE CLINIC | Age: 81
End: 2021-10-13

## 2021-10-13 NOTE — TELEPHONE ENCOUNTER
----- Message from Lakesha  sent at 10/8/2021  6:45 PM EDT -----  Subject: Message to Provider    QUESTIONS  Information for Provider? Pharmacy called to see if the patient can get   another medication. He stated the patient is allergic to the meds   prescribed. Please call the Pharmacy back. ---------------------------------------------------------------------------  --------------  Frank Ramirez INFO  What is the best way for the office to contact you? OK to leave message on   voicemail  Preferred Call Back Phone Number? [phone]  ---------------------------------------------------------------------------  --------------  SCRIPT ANSWERS  Relationship to Patient? Third Party  Representative Name?  [employer]

## 2021-10-25 ENCOUNTER — OFFICE VISIT (OUTPATIENT)
Dept: FAMILY MEDICINE CLINIC | Age: 81
End: 2021-10-25
Payer: MEDICARE

## 2021-10-25 VITALS
SYSTOLIC BLOOD PRESSURE: 122 MMHG | DIASTOLIC BLOOD PRESSURE: 76 MMHG | WEIGHT: 121.2 LBS | HEART RATE: 76 BPM | HEIGHT: 60 IN | BODY MASS INDEX: 23.8 KG/M2 | TEMPERATURE: 97.7 F | OXYGEN SATURATION: 98 %

## 2021-10-25 DIAGNOSIS — M81.0 AGE-RELATED OSTEOPOROSIS WITHOUT CURRENT PATHOLOGICAL FRACTURE: ICD-10-CM

## 2021-10-25 DIAGNOSIS — F13.29 SEDATIVE, HYPNOTIC OR ANXIOLYTIC DEPENDENCE WITH UNSPECIFIED SEDATIVE, HYPNOTIC OR ANXIOLYTIC-INDUCED DISORDER (HCC): ICD-10-CM

## 2021-10-25 DIAGNOSIS — R42 DIZZINESS: ICD-10-CM

## 2021-10-25 DIAGNOSIS — E78.2 MIXED HYPERLIPIDEMIA: ICD-10-CM

## 2021-10-25 DIAGNOSIS — B02.30 HERPES ZOSTER WITH OPHTHALMIC COMPLICATION, UNSPECIFIED HERPES ZOSTER EYE DISEASE: ICD-10-CM

## 2021-10-25 DIAGNOSIS — I10 PRIMARY HYPERTENSION: Primary | ICD-10-CM

## 2021-10-25 DIAGNOSIS — C80.1 CANCER (HCC): ICD-10-CM

## 2021-10-25 PROCEDURE — G8400 PT W/DXA NO RESULTS DOC: HCPCS | Performed by: FAMILY MEDICINE

## 2021-10-25 PROCEDURE — 1090F PRES/ABSN URINE INCON ASSESS: CPT | Performed by: FAMILY MEDICINE

## 2021-10-25 PROCEDURE — G8484 FLU IMMUNIZE NO ADMIN: HCPCS | Performed by: FAMILY MEDICINE

## 2021-10-25 PROCEDURE — 99214 OFFICE O/P EST MOD 30 MIN: CPT | Performed by: FAMILY MEDICINE

## 2021-10-25 PROCEDURE — G8427 DOCREV CUR MEDS BY ELIG CLIN: HCPCS | Performed by: FAMILY MEDICINE

## 2021-10-25 PROCEDURE — 1123F ACP DISCUSS/DSCN MKR DOCD: CPT | Performed by: FAMILY MEDICINE

## 2021-10-25 PROCEDURE — G8420 CALC BMI NORM PARAMETERS: HCPCS | Performed by: FAMILY MEDICINE

## 2021-10-25 PROCEDURE — 4040F PNEUMOC VAC/ADMIN/RCVD: CPT | Performed by: FAMILY MEDICINE

## 2021-10-25 PROCEDURE — 1036F TOBACCO NON-USER: CPT | Performed by: FAMILY MEDICINE

## 2021-10-25 RX ORDER — LORAZEPAM 0.5 MG/1
TABLET ORAL
COMMUNITY
Start: 2021-10-20 | End: 2022-01-31 | Stop reason: SDUPTHER

## 2021-10-25 RX ORDER — SERTRALINE HYDROCHLORIDE 25 MG/1
25 TABLET, FILM COATED ORAL DAILY
Qty: 30 TABLET | Refills: 3 | Status: SHIPPED
Start: 2021-10-25 | End: 2022-06-14

## 2021-10-25 ASSESSMENT — ENCOUNTER SYMPTOMS
SORE THROAT: 0
SINUS PAIN: 0
WHEEZING: 0
RECTAL PAIN: 0
APNEA: 0
ABDOMINAL PAIN: 0
RHINORRHEA: 0
VOMITING: 0
COUGH: 0
VOICE CHANGE: 0
CONSTIPATION: 0
PHOTOPHOBIA: 0
NAUSEA: 0
FACIAL SWELLING: 0
BACK PAIN: 1
SHORTNESS OF BREATH: 0
DIARRHEA: 0
CHEST TIGHTNESS: 0
ABDOMINAL DISTENTION: 0

## 2021-10-25 NOTE — PROGRESS NOTES
10/25/21  Avi Isidro : 1940 Sex: female  Age: 79 y.o. Chief Complaint   Patient presents with    Memory Loss     shes been having trouble and forgetting things     Other     bronchitis, she finished her antibiotic, the cough has went away.  Anxiety     been struggling this month, lost 2 of her cats, her  has been falling       HPI    Review of Systems   Constitutional: Positive for fatigue (but works continuous ). Negative for appetite change, chills, diaphoresis, fever and unexpected weight change. HENT: Negative for congestion, dental problem, ear discharge, ear pain, facial swelling, hearing loss, mouth sores, rhinorrhea, sinus pain, sore throat, tinnitus and voice change. Eyes: Positive for visual disturbance (missing one eye retinal cancer ). Negative for photophobia. Respiratory: Negative for apnea, cough, chest tightness, shortness of breath and wheezing. Cardiovascular: Negative for chest pain and palpitations. Gastrointestinal: Negative for abdominal distention, abdominal pain, constipation, diarrhea, nausea, rectal pain and vomiting. Endocrine: Negative for cold intolerance, heat intolerance and polyuria. Genitourinary: Negative for dysuria, frequency, hematuria and urgency. Musculoskeletal: Positive for arthralgias and back pain. Negative for gait problem, joint swelling, myalgias, neck pain and neck stiffness. Skin: Negative for rash and wound. Allergic/Immunologic: Negative for environmental allergies. Neurological: Positive for headaches. Negative for dizziness, tremors, seizures, syncope, facial asymmetry, speech difficulty, weakness, light-headedness and numbness. Hematological: Does not bruise/bleed easily. Psychiatric/Behavioral: Positive for agitation (always agitated and [id number] ,depression etc ). Negative for hallucinations and suicidal ideas. The patient is nervous/anxious. Physical Exam  Vitals reviewed.    Constitutional: General: She is not in acute distress. Appearance: She is not ill-appearing. HENT:      Head: Normocephalic. Right Ear: Tympanic membrane, ear canal and external ear normal. There is no impacted cerumen. Left Ear: Tympanic membrane, ear canal and external ear normal. There is no impacted cerumen. Nose: No rhinorrhea. Mouth/Throat:      Pharynx: No posterior oropharyngeal erythema. Eyes:      General:         Right eye: No discharge. Left eye: No discharge. Comments: One eye removed    Neck:      Vascular: No carotid bruit. Cardiovascular:      Rate and Rhythm: Regular rhythm. Heart sounds: Normal heart sounds. No murmur heard. No gallop. Pulmonary:      Breath sounds: Normal breath sounds. No wheezing, rhonchi or rales. Abdominal:      Palpations: Abdomen is soft. There is no mass. Tenderness: There is no abdominal tenderness. There is no guarding or rebound. Hernia: No hernia is present. Musculoskeletal:         General: Normal range of motion. Cervical back: Neck supple. Lymphadenopathy:      Cervical: No cervical adenopathy. Skin:     General: Skin is warm and dry. Findings: No bruising or rash. Neurological:      General: No focal deficit present. Mental Status: She is alert and oriented to person, place, and time. Cranial Nerves: No cranial nerve deficit. Motor: No weakness. Coordination: Coordination normal.      Gait: Gait normal.   Psychiatric:         Mood and Affect: Mood normal.         Behavior: Behavior normal.      Comments: emotionall overrought          Assessment and Plan:  There are no diagnoses linked to this encounter. Discussions/Education provided to patients during visit:  [] Discussed the importance to stop smoking. [] Advised to monitor eating habits. [] Reviewed and discussed Imaging results. [] Reviewed and discussed Lab results.   [] Discussed the importance of drinking plenty of fluids. [] Cut down on Salt, Caffeine, and Sugar. [x] Continue Medications as Discussed. [x] Communicated with patient any concerns, to phone office. Severe depresssion Zoloft 25 mg   conseling for 30 min   Attempted to help with v a for    Recheck 1 month      No follow-ups on file.       Seen by:     Emanuel Jimenez DO

## 2021-11-22 ENCOUNTER — OFFICE VISIT (OUTPATIENT)
Dept: FAMILY MEDICINE CLINIC | Age: 81
End: 2021-11-22
Payer: MEDICARE

## 2021-11-22 VITALS
DIASTOLIC BLOOD PRESSURE: 76 MMHG | SYSTOLIC BLOOD PRESSURE: 122 MMHG | TEMPERATURE: 96.9 F | HEART RATE: 101 BPM | BODY MASS INDEX: 23.36 KG/M2 | OXYGEN SATURATION: 99 % | HEIGHT: 60 IN | WEIGHT: 119 LBS

## 2021-11-22 DIAGNOSIS — I10 PRIMARY HYPERTENSION: Primary | ICD-10-CM

## 2021-11-22 DIAGNOSIS — H81.10 BENIGN PAROXYSMAL POSITIONAL VERTIGO, UNSPECIFIED LATERALITY: ICD-10-CM

## 2021-11-22 DIAGNOSIS — E78.2 MIXED HYPERLIPIDEMIA: ICD-10-CM

## 2021-11-22 DIAGNOSIS — B02.30 HERPES ZOSTER WITH OPHTHALMIC COMPLICATION, UNSPECIFIED HERPES ZOSTER EYE DISEASE: ICD-10-CM

## 2021-11-22 DIAGNOSIS — C69.20 MALIGNANT NEOPLASM OF RETINA, UNSPECIFIED LATERALITY (HCC): ICD-10-CM

## 2021-11-22 DIAGNOSIS — E07.9 THYROID DISEASE: ICD-10-CM

## 2021-11-22 PROCEDURE — G8400 PT W/DXA NO RESULTS DOC: HCPCS | Performed by: FAMILY MEDICINE

## 2021-11-22 PROCEDURE — G8420 CALC BMI NORM PARAMETERS: HCPCS | Performed by: FAMILY MEDICINE

## 2021-11-22 PROCEDURE — 4040F PNEUMOC VAC/ADMIN/RCVD: CPT | Performed by: FAMILY MEDICINE

## 2021-11-22 PROCEDURE — G8484 FLU IMMUNIZE NO ADMIN: HCPCS | Performed by: FAMILY MEDICINE

## 2021-11-22 PROCEDURE — 99213 OFFICE O/P EST LOW 20 MIN: CPT | Performed by: FAMILY MEDICINE

## 2021-11-22 PROCEDURE — G8427 DOCREV CUR MEDS BY ELIG CLIN: HCPCS | Performed by: FAMILY MEDICINE

## 2021-11-22 PROCEDURE — 1090F PRES/ABSN URINE INCON ASSESS: CPT | Performed by: FAMILY MEDICINE

## 2021-11-22 PROCEDURE — 1036F TOBACCO NON-USER: CPT | Performed by: FAMILY MEDICINE

## 2021-11-22 PROCEDURE — 1123F ACP DISCUSS/DSCN MKR DOCD: CPT | Performed by: FAMILY MEDICINE

## 2021-11-22 ASSESSMENT — ENCOUNTER SYMPTOMS
PHOTOPHOBIA: 0
SORE THROAT: 0
VOMITING: 0
SINUS PAIN: 0
RHINORRHEA: 0
FACIAL SWELLING: 0
CONSTIPATION: 0
CHEST TIGHTNESS: 0
VOICE CHANGE: 0
NAUSEA: 0
SHORTNESS OF BREATH: 0
APNEA: 0
BACK PAIN: 0
ABDOMINAL DISTENTION: 0
RECTAL PAIN: 0
COUGH: 0
DIARRHEA: 0
ABDOMINAL PAIN: 0
WHEEZING: 0

## 2021-11-22 NOTE — PROGRESS NOTES
21  Leyla Bailey : 1940 Sex: female  Age: 79 y.o. Chief Complaint   Patient presents with   [place]     Pt is still having a lot of anxiety, she hasnt tried the zoloft due to reading the side effects       Overall situation   Very emotional over cats   Very emotional over her    Very emotional over thanksgiving        Review of Systems   Constitutional: Negative for appetite change, chills, diaphoresis, fatigue, fever and unexpected weight change. HENT: Negative for congestion, dental problem, ear discharge, ear pain, facial swelling, hearing loss, mouth sores, rhinorrhea, sinus pain, sore throat, tinnitus and voice change. Eyes: Negative for photophobia and visual disturbance (no visual changes). Respiratory: Negative for apnea, cough, chest tightness, shortness of breath and wheezing. Cardiovascular: Negative for chest pain and palpitations. Gastrointestinal: Negative for abdominal distention, abdominal pain, constipation, diarrhea, nausea, rectal pain and vomiting. Endocrine: Negative for cold intolerance, heat intolerance and polyuria. Genitourinary: Negative for dysuria, frequency, hematuria and urgency. Musculoskeletal: Positive for arthralgias. Negative for back pain, gait problem, joint swelling, myalgias, neck pain and neck stiffness. Skin: Negative for rash and wound. Allergic/Immunologic: Negative for environmental allergies. Neurological: Positive for weakness (but does extreme about of activity ). Negative for dizziness, tremors, seizures, syncope, facial asymmetry, speech difficulty, light-headedness, numbness and headaches. Hematological: Does not bruise/bleed easily. Psychiatric/Behavioral: Negative for agitation, hallucinations and suicidal ideas. The patient is not nervous/anxious. Extreme emotional problems          Physical Exam  Vitals reviewed. Constitutional:       General: She is not in acute distress.      Appearance: She is not ill-appearing. HENT:      Head: Normocephalic. Right Ear: Tympanic membrane, ear canal and external ear normal. There is no impacted cerumen. Left Ear: Tympanic membrane, ear canal and external ear normal. There is no impacted cerumen. Nose: No rhinorrhea. Mouth/Throat:      Pharynx: No posterior oropharyngeal erythema. Eyes:      General:         Right eye: No discharge. Left eye: No discharge. Extraocular Movements: Extraocular movements intact. Pupils: Pupils are equal, round, and reactive to light. Neck:      Vascular: No carotid bruit. Comments: Little pain in neck no masses   Cardiovascular:      Rate and Rhythm: Regular rhythm. Heart sounds: Normal heart sounds. No murmur heard. No gallop. Comments: Dizziness when sitting up   Pulmonary:      Breath sounds: Normal breath sounds. No wheezing, rhonchi or rales. Abdominal:      Palpations: Abdomen is soft. There is no mass. Tenderness: There is no abdominal tenderness. There is no guarding or rebound. Hernia: No hernia is present. Musculoskeletal:         General: Normal range of motion. Cervical back: Neck supple. Lymphadenopathy:      Cervical: No cervical adenopathy. Skin:     General: Skin is warm and dry. Findings: No bruising or rash. Neurological:      General: No focal deficit present. Mental Status: She is alert and oriented to person, place, and time. Cranial Nerves: No cranial nerve deficit. Motor: No weakness. Coordination: Coordination normal.      Gait: Gait normal.   Psychiatric:         Mood and Affect: Mood normal.         Behavior: Behavior normal.      Comments: Reviewing deaths in family and  Growing up w mean father          Assessment and Plan:  Gisel Sosa was seen today for anxiety.     Diagnoses and all orders for this visit:    Primary hypertension    Benign paroxysmal positional vertigo, unspecified laterality    Thyroid disease    Malignant neoplasm of retina, unspecified laterality ([facility] [address]    Mixed hyperlipidemia    Herpes zoster with ophthalmic complication, unspecified herpes zoster eye disease          Discussions/Education provided to patients during visit:  [] Discussed the importance to stop smoking. [] Advised to monitor eating habits. [x] Reviewed and discussed Imaging results. [] Reviewed and discussed Lab results. [x] Discussed the importance of drinking plenty of fluids. [] Cut down on Salt, Caffeine, and Sugar. [x] Continue Medications as Discussed. [x] Communicated with patient any concerns, to phone office. Eat more   Take antidepressant     No follow-ups on file.       Seen by:     Amador Winslow,

## 2021-12-21 ENCOUNTER — TELEPHONE (OUTPATIENT)
Dept: FAMILY MEDICINE CLINIC | Age: 81
End: 2021-12-21

## 2021-12-21 DIAGNOSIS — R91.8 PULMONARY NODULES: Primary | ICD-10-CM

## 2021-12-27 ENCOUNTER — OFFICE VISIT (OUTPATIENT)
Dept: FAMILY MEDICINE CLINIC | Age: 81
End: 2021-12-27
Payer: MEDICARE

## 2021-12-27 VITALS
RESPIRATION RATE: 18 BRPM | BODY MASS INDEX: 23.4 KG/M2 | HEIGHT: 60 IN | SYSTOLIC BLOOD PRESSURE: 130 MMHG | HEART RATE: 84 BPM | OXYGEN SATURATION: 93 % | TEMPERATURE: 97.5 F | DIASTOLIC BLOOD PRESSURE: 70 MMHG | WEIGHT: 119.2 LBS

## 2021-12-27 DIAGNOSIS — I10 PRIMARY HYPERTENSION: ICD-10-CM

## 2021-12-27 DIAGNOSIS — E78.2 MIXED HYPERLIPIDEMIA: ICD-10-CM

## 2021-12-27 DIAGNOSIS — H81.10 BENIGN PAROXYSMAL POSITIONAL VERTIGO, UNSPECIFIED LATERALITY: ICD-10-CM

## 2021-12-27 DIAGNOSIS — F13.99 SEDATIVE, HYPNOTIC OR ANXIOLYTIC USE, UNSPECIFIED WITH UNSPECIFIED SEDATIVE, HYPNOTIC OR ANXIOLYTIC-INDUCED DISORDER (HCC): ICD-10-CM

## 2021-12-27 DIAGNOSIS — C69.20 MALIGNANT NEOPLASM OF RETINA, UNSPECIFIED LATERALITY (HCC): ICD-10-CM

## 2021-12-27 DIAGNOSIS — C80.1 CANCER (HCC): ICD-10-CM

## 2021-12-27 DIAGNOSIS — R91.8 PULMONARY NODULES: Primary | ICD-10-CM

## 2021-12-27 DIAGNOSIS — F13.20 SEDATIVE, HYPNOTIC OR ANXIOLYTIC DEPENDENCE, UNCOMPLICATED (HCC): ICD-10-CM

## 2021-12-27 DIAGNOSIS — F41.9 ANXIETY: ICD-10-CM

## 2021-12-27 PROCEDURE — 99214 OFFICE O/P EST MOD 30 MIN: CPT | Performed by: FAMILY MEDICINE

## 2021-12-27 PROCEDURE — G8427 DOCREV CUR MEDS BY ELIG CLIN: HCPCS | Performed by: FAMILY MEDICINE

## 2021-12-27 PROCEDURE — 1090F PRES/ABSN URINE INCON ASSESS: CPT | Performed by: FAMILY MEDICINE

## 2021-12-27 PROCEDURE — G8400 PT W/DXA NO RESULTS DOC: HCPCS | Performed by: FAMILY MEDICINE

## 2021-12-27 PROCEDURE — G8420 CALC BMI NORM PARAMETERS: HCPCS | Performed by: FAMILY MEDICINE

## 2021-12-27 PROCEDURE — 1036F TOBACCO NON-USER: CPT | Performed by: FAMILY MEDICINE

## 2021-12-27 PROCEDURE — G8484 FLU IMMUNIZE NO ADMIN: HCPCS | Performed by: FAMILY MEDICINE

## 2021-12-27 PROCEDURE — 4040F PNEUMOC VAC/ADMIN/RCVD: CPT | Performed by: FAMILY MEDICINE

## 2021-12-27 PROCEDURE — 1123F ACP DISCUSS/DSCN MKR DOCD: CPT | Performed by: FAMILY MEDICINE

## 2021-12-27 RX ORDER — TOBRAMYCIN AND DEXAMETHASONE 3; 1 MG/ML; MG/ML
SUSPENSION/ DROPS OPHTHALMIC
COMMUNITY
Start: 2021-12-13 | End: 2022-09-22

## 2021-12-27 ASSESSMENT — PATIENT HEALTH QUESTIONNAIRE - PHQ9
SUM OF ALL RESPONSES TO PHQ QUESTIONS 1-9: 1
1. LITTLE INTEREST OR PLEASURE IN DOING THINGS: 0
SUM OF ALL RESPONSES TO PHQ9 QUESTIONS 1 & 2: 1
SUM OF ALL RESPONSES TO PHQ QUESTIONS 1-9: 1
2. FEELING DOWN, DEPRESSED OR HOPELESS: 1
SUM OF ALL RESPONSES TO PHQ QUESTIONS 1-9: 1

## 2021-12-27 ASSESSMENT — ENCOUNTER SYMPTOMS
APNEA: 0
VOICE CHANGE: 0
SORE THROAT: 0
BACK PAIN: 1
RECTAL PAIN: 0
NAUSEA: 0
FACIAL SWELLING: 0
ABDOMINAL PAIN: 0
CHEST TIGHTNESS: 0
SHORTNESS OF BREATH: 0
VOMITING: 0
PHOTOPHOBIA: 0
RHINORRHEA: 0
SINUS PAIN: 0
DIARRHEA: 0
COUGH: 0
WHEEZING: 0
ABDOMINAL DISTENTION: 0
CONSTIPATION: 0

## 2021-12-27 NOTE — PROGRESS NOTES
21  Jessica Cooper : 1940 Sex: female  Age: 79 y.o. Chief Complaint   Patient presents with    Hypertension     check up ,     Skin Problem     sore lump on right side of lip /face        Overall check up       Review of Systems   Constitutional: Negative for appetite change, chills, diaphoresis, fatigue, fever and unexpected weight change. HENT: Negative for congestion, dental problem, ear discharge, ear pain, facial swelling, hearing loss, mouth sores, rhinorrhea, sinus pain, sore throat, tinnitus and voice change. Blister ON lip r side    Eyes: Negative for photophobia and visual disturbance (no visual changes). Respiratory: Negative for apnea, cough, chest tightness, shortness of breath and wheezing. Cardiovascular: Negative for chest pain and palpitations. Gastrointestinal: Negative for abdominal distention, abdominal pain, constipation, diarrhea, nausea, rectal pain and vomiting. Endocrine: Negative for cold intolerance, heat intolerance and polyuria. Genitourinary: Negative for dysuria, frequency, hematuria and urgency. Musculoskeletal: Positive for arthralgias and back pain. Negative for gait problem, joint swelling, myalgias, neck pain and neck stiffness. Skin: Negative for rash and wound. Allergic/Immunologic: Negative for environmental allergies. Neurological: Negative for dizziness, tremors, seizures, syncope, facial asymmetry, speech difficulty, weakness, light-headedness, numbness and headaches. Hematological: Does not bruise/bleed easily. Psychiatric/Behavioral: Negative for agitation, hallucinations and suicidal ideas. The patient is not nervous/anxious. Emotional  whole visit but feels some better overall          Physical Exam  Vitals reviewed. Constitutional:       General: She is not in acute distress. Appearance: She is not ill-appearing. HENT:      Head: Normocephalic.       Right Ear: Tympanic membrane, ear canal and external ear normal. There is no impacted cerumen. Left Ear: Tympanic membrane, ear canal and external ear normal. There is no impacted cerumen. Nose: No rhinorrhea. Mouth/Throat:      Pharynx: No posterior oropharyngeal erythema. Eyes:      General:         Right eye: No discharge. Left eye: No discharge. Extraocular Movements: Extraocular movements intact. Pupils: Pupils are equal, round, and reactive to light. Neck:      Vascular: No carotid bruit. Cardiovascular:      Rate and Rhythm: Regular rhythm. Heart sounds: Normal heart sounds. No murmur heard. No gallop. Pulmonary:      Breath sounds: Normal breath sounds. No wheezing, rhonchi or rales. Abdominal:      Palpations: Abdomen is soft. There is no mass. Tenderness: There is no abdominal tenderness. There is no guarding or rebound. Hernia: No hernia is present. Musculoskeletal:         General: Normal range of motion. Cervical back: Neck supple. Lymphadenopathy:      Cervical: No cervical adenopathy. Skin:     General: Skin is warm and dry. Findings: No bruising or rash. Neurological:      General: No focal deficit present. Mental Status: She is alert and oriented to person, place, and time. Cranial Nerves: No cranial nerve deficit. Motor: No weakness. Coordination: Coordination normal.      Gait: Gait normal.   Psychiatric:         Mood and Affect: Mood normal.         Behavior: Behavior normal.         Assessment and Plan:  Anayeli Mccullough was seen today for hypertension and skin problem.     Diagnoses and all orders for this visit:    Pulmonary nodules    Mixed hyperlipidemia    Sedative, hypnotic or anxiolytic use, unspecified with unspecified sedative, hypnotic or anxiolytic-induced disorder    Primary hypertension    Benign paroxysmal positional vertigo, unspecified laterality    Sedative, hypnotic or anxiolytic dependence, uncomplicated    Malignant neoplasm of retina, unspecified laterality ([facility] [address]    Cancer ([facility]    Anxiety          Discussions/Education provided to patients during visit:  [] Discussed the importance to stop smoking. [] Advised to monitor eating habits. [] Reviewed and discussed Imaging results. [] Reviewed and discussed Lab results. [] Discussed the importance of drinking plenty of fluids. [] Cut down on Salt, Caffeine, and Sugar. [x] Continue Medications as Discussed. [x] Communicated with patient any concerns, to phone office. taking one halfof sertiline and 1 lorazepam and or one whope one   Ct chest pending left chest pain on ribs   Recheck 1 month     No follow-ups on file.       Seen by:     Kellie Files, DO

## 2021-12-28 ENCOUNTER — TELEPHONE (OUTPATIENT)
Dept: FAMILY MEDICINE CLINIC | Age: 81
End: 2021-12-28

## 2021-12-28 NOTE — TELEPHONE ENCOUNTER
I called [facility] [phone] spoke with Angela's they have the order and have tried to call the patient 3 times and she has not returned their phone calls. I called the patient and left a message on her answer machine as such and she should call them to schedule CT scan.

## 2022-01-31 ENCOUNTER — OFFICE VISIT (OUTPATIENT)
Dept: FAMILY MEDICINE CLINIC | Age: 82
End: 2022-01-31
Payer: MEDICARE

## 2022-01-31 VITALS
DIASTOLIC BLOOD PRESSURE: 80 MMHG | OXYGEN SATURATION: 97 % | WEIGHT: 118.6 LBS | SYSTOLIC BLOOD PRESSURE: 122 MMHG | BODY MASS INDEX: 22.39 KG/M2 | HEART RATE: 74 BPM | TEMPERATURE: 97 F | HEIGHT: 61 IN

## 2022-01-31 DIAGNOSIS — E07.9 THYROID DISEASE: ICD-10-CM

## 2022-01-31 DIAGNOSIS — F41.9 ANXIETY: ICD-10-CM

## 2022-01-31 DIAGNOSIS — I10 PRIMARY HYPERTENSION: Primary | ICD-10-CM

## 2022-01-31 DIAGNOSIS — C69.20 MALIGNANT NEOPLASM OF RETINA, UNSPECIFIED LATERALITY (HCC): ICD-10-CM

## 2022-01-31 DIAGNOSIS — C80.1 CANCER (HCC): ICD-10-CM

## 2022-01-31 DIAGNOSIS — H81.10 BENIGN PAROXYSMAL POSITIONAL VERTIGO, UNSPECIFIED LATERALITY: ICD-10-CM

## 2022-01-31 DIAGNOSIS — E78.2 MIXED HYPERLIPIDEMIA: ICD-10-CM

## 2022-01-31 DIAGNOSIS — E53.8 VITAMIN B12 DEFICIENCY (NON ANEMIC): ICD-10-CM

## 2022-01-31 PROCEDURE — 1123F ACP DISCUSS/DSCN MKR DOCD: CPT | Performed by: FAMILY MEDICINE

## 2022-01-31 PROCEDURE — G8420 CALC BMI NORM PARAMETERS: HCPCS | Performed by: FAMILY MEDICINE

## 2022-01-31 PROCEDURE — 1036F TOBACCO NON-USER: CPT | Performed by: FAMILY MEDICINE

## 2022-01-31 PROCEDURE — G8400 PT W/DXA NO RESULTS DOC: HCPCS | Performed by: FAMILY MEDICINE

## 2022-01-31 PROCEDURE — G8484 FLU IMMUNIZE NO ADMIN: HCPCS | Performed by: FAMILY MEDICINE

## 2022-01-31 PROCEDURE — 4040F PNEUMOC VAC/ADMIN/RCVD: CPT | Performed by: FAMILY MEDICINE

## 2022-01-31 PROCEDURE — G8427 DOCREV CUR MEDS BY ELIG CLIN: HCPCS | Performed by: FAMILY MEDICINE

## 2022-01-31 PROCEDURE — 1090F PRES/ABSN URINE INCON ASSESS: CPT | Performed by: FAMILY MEDICINE

## 2022-01-31 PROCEDURE — 99214 OFFICE O/P EST MOD 30 MIN: CPT | Performed by: FAMILY MEDICINE

## 2022-01-31 RX ORDER — LORAZEPAM 0.5 MG/1
TABLET ORAL
Qty: 60 TABLET | Refills: 1 | Status: SHIPPED | OUTPATIENT
Start: 2022-01-31 | End: 2022-02-28

## 2022-01-31 ASSESSMENT — ENCOUNTER SYMPTOMS
NAUSEA: 0
ABDOMINAL DISTENTION: 0
CHEST TIGHTNESS: 0
SORE THROAT: 0
VOICE CHANGE: 0
FACIAL SWELLING: 0
ABDOMINAL PAIN: 0
VOMITING: 0
APNEA: 0
RECTAL PAIN: 0
SHORTNESS OF BREATH: 0
RHINORRHEA: 0
WHEEZING: 0
SINUS PAIN: 0
DIARRHEA: 0
CONSTIPATION: 0
COUGH: 0
BACK PAIN: 0
PHOTOPHOBIA: 0

## 2022-01-31 NOTE — PROGRESS NOTES
22  Maxwell Duarte : 1940 Sex: female  Age: 79 y.o. Chief Complaint   Patient presents with    Medication Refill       HPI overall check up     Review of Systems   Constitutional: Negative for appetite change, chills, diaphoresis, fatigue, fever and unexpected weight change. HENT: Negative for congestion, dental problem, ear discharge, ear pain, facial swelling, hearing loss, mouth sores, rhinorrhea, sinus pain, sore throat, tinnitus and voice change. Eyes: Negative for photophobia and visual disturbance (no visual changes). Respiratory: Negative for apnea, cough, chest tightness, shortness of breath and wheezing. Cardiovascular: Positive for chest pain (left side chronic rib pain ). Negative for palpitations. Gastrointestinal: Negative for abdominal distention, abdominal pain, constipation, diarrhea, nausea, rectal pain and vomiting. Endocrine: Negative for cold intolerance, heat intolerance and polyuria. Genitourinary: Negative for dysuria, frequency, hematuria and urgency. Musculoskeletal: Negative for arthralgias, back pain, gait problem, joint swelling, myalgias, neck pain and neck stiffness. Skin: Negative for rash and wound. Allergic/Immunologic: Negative for environmental allergies. Neurological: Negative for dizziness, tremors, seizures, syncope, facial asymmetry, speech difficulty, weakness, light-headedness, numbness and headaches. Hematological: Does not bruise/bleed easily. Psychiatric/Behavioral: Negative for agitation, hallucinations and suicidal ideas. The patient is nervous/anxious (depression also fights all meds to help ). Physical Exam  Vitals reviewed. Constitutional:       General: She is not in acute distress. Appearance: She is not ill-appearing. Comments: 79 y o white female    HENT:      Head: Normocephalic. Right Ear: Tympanic membrane, ear canal and external ear normal. There is no impacted cerumen.       Left Ear: Tympanic membrane, ear canal and external ear normal. There is no impacted cerumen. Nose: No rhinorrhea. Mouth/Throat:      Pharynx: No posterior oropharyngeal erythema. Eyes:      General:         Right eye: No discharge. Left eye: No discharge. Extraocular Movements: Extraocular movements intact. Pupils: Pupils are equal, round, and reactive to light. Neck:      Vascular: No carotid bruit. Cardiovascular:      Rate and Rhythm: Regular rhythm. Heart sounds: Normal heart sounds. No murmur heard. No gallop. Pulmonary:      Breath sounds: Normal breath sounds. No wheezing, rhonchi or rales. Abdominal:      Palpations: Abdomen is soft. There is no mass. Tenderness: There is no abdominal tenderness. There is no guarding or rebound. Hernia: No hernia is present. Musculoskeletal:         General: Normal range of motion. Cervical back: Neck supple. Lymphadenopathy:      Cervical: No cervical adenopathy. Skin:     General: Skin is warm and dry. Findings: No bruising or rash. Neurological:      General: No focal deficit present. Mental Status: She is alert and oriented to person, place, and time. Cranial Nerves: No cranial nerve deficit. Motor: No weakness. Coordination: Coordination normal.      Gait: Gait normal.   Psychiatric:         Mood and Affect: Mood normal.         Behavior: Behavior normal.         Assessment and Plan:  Lisette Diaz was seen today for medication refill. Diagnoses and all orders for this visit:    Primary hypertension    Cancer ([address]    Mixed hyperlipidemia    Malignant neoplasm of retina, unspecified laterality ([address]    Vitamin B12 deficiency (non anemic)    Anxiety    Benign paroxysmal positional vertigo, unspecified laterality    Thyroid disease          Discussions/Education provided to patients during visit:  [] Discussed the importance to stop smoking. [] Advised to monitor eating habits.   [x] Reviewed and discussed Imaging results. [x] Reviewed and discussed Lab results. [x] Discussed the importance of drinking plenty of fluids. [x] Cut down on Salt, Caffeine, and Sugar. [x] Continue Medications as Discussed. [x] Communicated with patient any concerns, to phone office. Discussed the ct scan    labs next visit   Renew lorazepam   Discussed situation   2 months  Follow up   No follow-ups on file.       Seen by:     Julian Cool DO

## 2022-02-21 ENCOUNTER — TELEPHONE (OUTPATIENT)
Dept: FAMILY MEDICINE CLINIC | Age: 82
End: 2022-02-21

## 2022-02-21 NOTE — TELEPHONE ENCOUNTER
Patient left message on voicemail arthritis pain in fingers and hands and are swollen. Tried to call patient back line busy.

## 2022-02-21 NOTE — TELEPHONE ENCOUNTER
Spoke with patient she stated her right wrist is swollen and right hand is painful thinks it is arthritis but thinks she needs an X-ray. Informed patient will need to be seen in the office call transferred to [facility].

## 2022-02-22 ENCOUNTER — OFFICE VISIT (OUTPATIENT)
Dept: FAMILY MEDICINE CLINIC | Age: 82
End: 2022-02-22
Payer: MEDICARE

## 2022-02-22 VITALS
TEMPERATURE: 97.1 F | HEART RATE: 90 BPM | OXYGEN SATURATION: 98 % | BODY MASS INDEX: 23.13 KG/M2 | WEIGHT: 117.8 LBS | DIASTOLIC BLOOD PRESSURE: 92 MMHG | SYSTOLIC BLOOD PRESSURE: 160 MMHG | HEIGHT: 60 IN

## 2022-02-22 DIAGNOSIS — S60.211A CONTUSION OF RIGHT WRIST, INITIAL ENCOUNTER: Primary | ICD-10-CM

## 2022-02-22 DIAGNOSIS — H65.92 LEFT NON-SUPPURATIVE OTITIS MEDIA: ICD-10-CM

## 2022-02-22 DIAGNOSIS — S60.221A CONTUSION OF RIGHT HAND, INITIAL ENCOUNTER: ICD-10-CM

## 2022-02-22 PROCEDURE — G8400 PT W/DXA NO RESULTS DOC: HCPCS | Performed by: FAMILY MEDICINE

## 2022-02-22 PROCEDURE — 1090F PRES/ABSN URINE INCON ASSESS: CPT | Performed by: FAMILY MEDICINE

## 2022-02-22 PROCEDURE — 99214 OFFICE O/P EST MOD 30 MIN: CPT | Performed by: FAMILY MEDICINE

## 2022-02-22 PROCEDURE — G8484 FLU IMMUNIZE NO ADMIN: HCPCS | Performed by: FAMILY MEDICINE

## 2022-02-22 PROCEDURE — G8420 CALC BMI NORM PARAMETERS: HCPCS | Performed by: FAMILY MEDICINE

## 2022-02-22 PROCEDURE — 4040F PNEUMOC VAC/ADMIN/RCVD: CPT | Performed by: FAMILY MEDICINE

## 2022-02-22 PROCEDURE — G8427 DOCREV CUR MEDS BY ELIG CLIN: HCPCS | Performed by: FAMILY MEDICINE

## 2022-02-22 PROCEDURE — 1123F ACP DISCUSS/DSCN MKR DOCD: CPT | Performed by: FAMILY MEDICINE

## 2022-02-22 PROCEDURE — 1036F TOBACCO NON-USER: CPT | Performed by: FAMILY MEDICINE

## 2022-02-22 RX ORDER — ACETAMINOPHEN 650 MG/1
650 SUPPOSITORY RECTAL EVERY 4 HOURS PRN
COMMUNITY
End: 2022-06-15

## 2022-02-22 RX ORDER — CEPHALEXIN 500 MG/1
500 CAPSULE ORAL 4 TIMES DAILY
Qty: 40 CAPSULE | Refills: 0 | Status: SHIPPED
Start: 2022-02-22 | End: 2022-05-08

## 2022-02-22 ASSESSMENT — ENCOUNTER SYMPTOMS
CONSTIPATION: 0
TROUBLE SWALLOWING: 0
SINUS PAIN: 0
EYE REDNESS: 0
EYE DISCHARGE: 0
CHEST TIGHTNESS: 0
SHORTNESS OF BREATH: 0
ABDOMINAL PAIN: 0
WHEEZING: 0
COUGH: 0
SORE THROAT: 0
NAUSEA: 0
DIARRHEA: 0

## 2022-02-22 NOTE — PROGRESS NOTES
22  Mandi Mckenzie : 1940 Sex: female  Age: 79 y.o. Chief Complaint   Patient presents with    Wrist Pain     Began 5 days ago, warm to touch.  Dizziness     Today had episode today with dizziness, fell       HPI this pleasant 70-year-old lady is here complaining of right wrist and hand pain. She thinks that started about 5 days ago and worsened this morning when she fell catching herself on that hand. She did have a bit of dizziness this morning which is not her norm. She states that she has fallen about 7 times in the last month but that is not due to being dizzy. 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 Every day she walks about a mile to dump a bucket of corn or apples for some deer. Review of Systems   Constitutional: Negative for diaphoresis, fatigue, fever and unexpected weight change. HENT: Positive for ear pain. Negative for congestion, ear discharge, sinus pain, sore throat and trouble swallowing. Eyes: Negative for discharge and redness. Respiratory: Negative for cough, chest tightness, shortness of breath and wheezing. Cardiovascular: Negative for chest pain, palpitations and leg swelling. Gastrointestinal: Negative for abdominal pain, constipation, diarrhea and nausea. Endocrine: Negative for polydipsia and polyuria. Genitourinary: Negative for dysuria, flank pain, frequency and urgency. Musculoskeletal: Positive for arthralgias. Negative for myalgias. Skin: Negative for rash. Allergic/Immunologic: Negative for immunocompromised state. Neurological: Positive for dizziness. Negative for syncope, numbness and headaches. Hematological: Does not bruise/bleed easily. Psychiatric/Behavioral: Negative for sleep disturbance and suicidal ideas. The patient is not nervous/anxious. Physical Exam  Vitals and nursing note reviewed. Constitutional:       Appearance: Normal appearance. HENT:      Head: Normocephalic. Right Ear: Tympanic membrane is bulging. Left Ear: Tenderness present. Tympanic membrane is erythematous and bulging. Nose: Congestion present. Mouth/Throat:      Pharynx: Oropharynx is clear. Eyes:      Pupils: Pupils are equal, round, and reactive to light. Cardiovascular:      Rate and Rhythm: Normal rate and regular rhythm. Pulses: Normal pulses. Heart sounds: Normal heart sounds. Pulmonary:      Effort: Pulmonary effort is normal.      Breath sounds: Normal breath sounds. No wheezing, rhonchi or rales. Abdominal:      Palpations: Abdomen is soft. Tenderness: There is no abdominal tenderness. Musculoskeletal:      Right wrist: Swelling and tenderness present. Right hand: Tenderness present. Comments: There is tenderness at the radial aspect of the right wrist as well as the base of the thumb and the radial carpal bones. Skin:     General: Skin is warm and dry. Capillary Refill: Capillary refill takes less than 2 seconds. Neurological:      Mental Status: She is alert and oriented to person, place, and time. Psychiatric:         Mood and Affect: Mood normal.         Behavior: Behavior normal.         Assessment and Plan:  Carrie cMcall was seen today for wrist pain and dizziness. Diagnoses and all orders for this visit:    Contusion of right wrist, initial encounter  -     XR WRIST RIGHT (MIN 3 VIEWS); Future    Contusion of right hand, initial encounter  -     XR WRIST RIGHT (MIN 3 VIEWS); Future  -     XR HAND RIGHT (MIN 3 VIEWS); Future    Left non-suppurative otitis media    Other orders  -     cephALEXin (KEFLEX) 500 MG capsule; Take 1 capsule by mouth 4 times daily  -     CELEBREX 100 MG capsule; Take 1 capsule by mouth 2 times daily          Discussions/Education provided to patients during visit:  [] Discussed the importance to stop smoking.   [] Advised to monitor eating habits. [] Reviewed and discussed Imaging results. [] Reviewed and discussed Lab results. [] Discussed the importance of drinking plenty of fluids. [] Cut down on Salt, Caffeine, and Sugar. [x] Continue Medications as Discussed. [x] Communicated with patient any concerns, to phone office. No follow-ups on file.       Seen By:  Corin Joseph,

## 2022-02-23 ENCOUNTER — TELEPHONE (OUTPATIENT)
Dept: FAMILY MEDICINE CLINIC | Age: 82
End: 2022-02-23

## 2022-02-23 NOTE — TELEPHONE ENCOUNTER
Had x-ray  Of right wrist and hand  this morning, asking for result. Resutls back, not reviewed by Dr Niraj Srivastava. Reports are in the chart, I did tell her no fracture or dislocation. She is asking why she has so much pain?

## 2022-02-24 ENCOUNTER — TELEPHONE (OUTPATIENT)
Dept: FAMILY MEDICINE CLINIC | Age: 82
End: 2022-02-24

## 2022-02-24 NOTE — TELEPHONE ENCOUNTER
----- Message from Fatmata Ramses sent at 2/24/2022  8:47 AM EST -----  Subject: Message to Provider    QUESTIONS  Information for Provider? Pt calling to see if Dr Saumya Marquez has looked over   the results of her x-ray. Is in alot of pain still. Wants to be called as   soon as possible.   ---------------------------------------------------------------------------  --------------  CALL BACK INFO  What is the best way for the office to contact you? OK to leave message on   voicemail  Preferred Call Back Phone Number? [phone]  ---------------------------------------------------------------------------  --------------  SCRIPT ANSWERS  Relationship to Patient?  Self

## 2022-02-24 NOTE — TELEPHONE ENCOUNTER
X-rays ordered by Dr Kia Mora, this message forward to him. Pt was ordered Celebrex but rx was not sent, just pended, so I called in the rx.

## 2022-02-24 NOTE — TELEPHONE ENCOUNTER
Per Dr. 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 Pt. Was given Celebrex 2/22/22, but this did not get to the pharmacy ,so I called it in today.

## 2022-03-01 NOTE — TELEPHONE ENCOUNTER
Spoke with patient and she stated will see how it goes and if not better will call and see Dr. Simba Grier.

## 2022-04-01 ENCOUNTER — TELEPHONE (OUTPATIENT)
Dept: FAMILY MEDICINE CLINIC | Age: 82
End: 2022-04-01

## 2022-04-01 NOTE — TELEPHONE ENCOUNTER
----- Message from Landry Pimentel sent at 4/1/2022 11:40 AM EDT -----  Subject: Message to Provider    QUESTIONS  Information for Provider? has another apt at 10 wants to know if she is   just getting blood work then can leave.  ---------------------------------------------------------------------------  --------------  [address]  What is the best way for the office to contact you? OK to leave message on   voicemail  Preferred Call Back Phone Number?  [phone]  ---------------------------------------------------------------------------  --------------  SCRIPT ANSWERS  undefined

## 2022-04-01 NOTE — TELEPHONE ENCOUNTER
Her appt. Is not at 10, its at 9 AM. She needs to reschedule this as she has an eye doctor appt. At 10. Willie Ac rescheduled her.

## 2022-04-05 ENCOUNTER — NURSE ONLY (OUTPATIENT)
Dept: FAMILY MEDICINE CLINIC | Age: 82
End: 2022-04-05
Payer: MEDICARE

## 2022-04-05 DIAGNOSIS — E07.9 THYROID DISEASE: ICD-10-CM

## 2022-04-05 DIAGNOSIS — H81.10 BENIGN PAROXYSMAL POSITIONAL VERTIGO, UNSPECIFIED LATERALITY: ICD-10-CM

## 2022-04-05 DIAGNOSIS — C69.20 MALIGNANT NEOPLASM OF RETINA, UNSPECIFIED LATERALITY (HCC): ICD-10-CM

## 2022-04-05 DIAGNOSIS — E53.8 VITAMIN B12 DEFICIENCY (NON ANEMIC): ICD-10-CM

## 2022-04-05 DIAGNOSIS — E78.2 MIXED HYPERLIPIDEMIA: ICD-10-CM

## 2022-04-05 DIAGNOSIS — F41.9 ANXIETY: ICD-10-CM

## 2022-04-05 DIAGNOSIS — I10 PRIMARY HYPERTENSION: ICD-10-CM

## 2022-04-05 LAB
BASOPHILS ABSOLUTE: 0.04 E9/L (ref 0–0.2)
BASOPHILS RELATIVE PERCENT: 0.7 % (ref 0–2)
EOSINOPHILS ABSOLUTE: 0.1 E9/L (ref 0.05–0.5)
EOSINOPHILS RELATIVE PERCENT: 1.8 % (ref 0–6)
HCT VFR BLD CALC: 44.9 % (ref 34–48)
HEMOGLOBIN: 13.6 G/DL (ref 11.5–15.5)
IMMATURE GRANULOCYTES #: 0.01 E9/L
IMMATURE GRANULOCYTES %: 0.2 % (ref 0–5)
LYMPHOCYTES ABSOLUTE: 2.36 E9/L (ref 1.5–4)
LYMPHOCYTES RELATIVE PERCENT: 42.4 % (ref 20–42)
MCH RBC QN AUTO: 28.6 PG (ref 26–35)
MCHC RBC AUTO-ENTMCNC: 30.3 % (ref 32–34.5)
MCV RBC AUTO: 94.3 FL (ref 80–99.9)
MONOCYTES ABSOLUTE: 0.46 E9/L (ref 0.1–0.95)
MONOCYTES RELATIVE PERCENT: 8.3 % (ref 2–12)
NEUTROPHILS ABSOLUTE: 2.59 E9/L (ref 1.8–7.3)
NEUTROPHILS RELATIVE PERCENT: 46.6 % (ref 43–80)
PDW BLD-RTO: 14.5 FL (ref 11.5–15)
PLATELET # BLD: 325 E9/L (ref 130–450)
PMV BLD AUTO: 10.1 FL (ref 7–12)
RBC # BLD: 4.76 E12/L (ref 3.5–5.5)
WBC # BLD: 5.6 E9/L (ref 4.5–11.5)

## 2022-04-05 PROCEDURE — 99999 PR OFFICE/OUTPT VISIT,PROCEDURE ONLY: CPT | Performed by: FAMILY MEDICINE

## 2022-04-05 PROCEDURE — 36415 COLL VENOUS BLD VENIPUNCTURE: CPT | Performed by: FAMILY MEDICINE

## 2022-04-06 LAB
ALBUMIN SERPL-MCNC: 4.4 G/DL (ref 3.5–5.2)
ALP BLD-CCNC: 66 U/L (ref 35–104)
ALT SERPL-CCNC: 9 U/L (ref 0–32)
ANION GAP SERPL CALCULATED.3IONS-SCNC: 12 MMOL/L (ref 7–16)
AST SERPL-CCNC: 19 U/L (ref 0–31)
BILIRUB SERPL-MCNC: 0.5 MG/DL (ref 0–1.2)
BUN BLDV-MCNC: 12 MG/DL (ref 6–23)
CALCIUM SERPL-MCNC: 9.7 MG/DL (ref 8.6–10.2)
CHLORIDE BLD-SCNC: 104 MMOL/L (ref 98–107)
CHOLESTEROL, TOTAL: 234 MG/DL (ref 0–199)
CO2: 26 MMOL/L (ref 22–29)
CREAT SERPL-MCNC: 0.8 MG/DL (ref 0.5–1)
FOLATE: 13.3 NG/ML (ref 4.8–24.2)
GFR AFRICAN AMERICAN: >60
GFR NON-AFRICAN AMERICAN: >60 ML/MIN/1.73
GLUCOSE BLD-MCNC: 91 MG/DL (ref 74–99)
HDLC SERPL-MCNC: 51 MG/DL
LDL CHOLESTEROL CALCULATED: 163 MG/DL (ref 0–99)
POTASSIUM SERPL-SCNC: 5.3 MMOL/L (ref 3.5–5)
SODIUM BLD-SCNC: 142 MMOL/L (ref 132–146)
T4 FREE: 1.1 NG/DL (ref 0.93–1.7)
TOTAL PROTEIN: 7.6 G/DL (ref 6.4–8.3)
TRIGL SERPL-MCNC: 102 MG/DL (ref 0–149)
TSH SERPL DL<=0.05 MIU/L-ACNC: 3.01 UIU/ML (ref 0.27–4.2)
VITAMIN B-12: 260 PG/ML (ref 211–946)
VLDLC SERPL CALC-MCNC: 20 MG/DL

## 2022-04-08 ENCOUNTER — OFFICE VISIT (OUTPATIENT)
Dept: FAMILY MEDICINE CLINIC | Age: 82
End: 2022-04-08
Payer: MEDICARE

## 2022-04-08 VITALS
HEART RATE: 83 BPM | BODY MASS INDEX: 24.07 KG/M2 | WEIGHT: 122.6 LBS | TEMPERATURE: 97.8 F | OXYGEN SATURATION: 98 % | DIASTOLIC BLOOD PRESSURE: 86 MMHG | RESPIRATION RATE: 18 BRPM | HEIGHT: 60 IN | SYSTOLIC BLOOD PRESSURE: 120 MMHG

## 2022-04-08 DIAGNOSIS — E53.8 VITAMIN B12 DEFICIENCY (NON ANEMIC): ICD-10-CM

## 2022-04-08 DIAGNOSIS — H81.10 BENIGN PAROXYSMAL POSITIONAL VERTIGO, UNSPECIFIED LATERALITY: ICD-10-CM

## 2022-04-08 DIAGNOSIS — S60.221A CONTUSION OF RIGHT HAND, INITIAL ENCOUNTER: ICD-10-CM

## 2022-04-08 DIAGNOSIS — F13.99 SEDATIVE, HYPNOTIC OR ANXIOLYTIC USE, UNSPECIFIED WITH UNSPECIFIED SEDATIVE, HYPNOTIC OR ANXIOLYTIC-INDUCED DISORDER (HCC): ICD-10-CM

## 2022-04-08 DIAGNOSIS — E07.9 THYROID DISEASE: ICD-10-CM

## 2022-04-08 DIAGNOSIS — C80.1 CANCER (HCC): ICD-10-CM

## 2022-04-08 DIAGNOSIS — S60.211A CONTUSION OF RIGHT WRIST, INITIAL ENCOUNTER: Primary | ICD-10-CM

## 2022-04-08 DIAGNOSIS — C69.20 MALIGNANT NEOPLASM OF RETINA, UNSPECIFIED LATERALITY (HCC): ICD-10-CM

## 2022-04-08 DIAGNOSIS — I10 PRIMARY HYPERTENSION: ICD-10-CM

## 2022-04-08 PROCEDURE — 1090F PRES/ABSN URINE INCON ASSESS: CPT | Performed by: FAMILY MEDICINE

## 2022-04-08 PROCEDURE — G8400 PT W/DXA NO RESULTS DOC: HCPCS | Performed by: FAMILY MEDICINE

## 2022-04-08 PROCEDURE — 4040F PNEUMOC VAC/ADMIN/RCVD: CPT | Performed by: FAMILY MEDICINE

## 2022-04-08 PROCEDURE — 99213 OFFICE O/P EST LOW 20 MIN: CPT | Performed by: FAMILY MEDICINE

## 2022-04-08 PROCEDURE — 1123F ACP DISCUSS/DSCN MKR DOCD: CPT | Performed by: FAMILY MEDICINE

## 2022-04-08 PROCEDURE — G8420 CALC BMI NORM PARAMETERS: HCPCS | Performed by: FAMILY MEDICINE

## 2022-04-08 PROCEDURE — G8427 DOCREV CUR MEDS BY ELIG CLIN: HCPCS | Performed by: FAMILY MEDICINE

## 2022-04-08 PROCEDURE — 1036F TOBACCO NON-USER: CPT | Performed by: FAMILY MEDICINE

## 2022-04-08 RX ORDER — LORAZEPAM 0.5 MG/1
0.5 TABLET ORAL 2 TIMES DAILY
COMMUNITY
End: 2022-04-11 | Stop reason: SDUPTHER

## 2022-04-08 RX ORDER — PREDNISONE 1 MG/1
TABLET ORAL
Qty: 10 TABLET | Refills: 0 | Status: SHIPPED | OUTPATIENT
Start: 2022-04-08 | End: 2022-04-11 | Stop reason: SDUPTHER

## 2022-04-08 NOTE — PROGRESS NOTES
22  [facility] : 1940 Sex: female  Age: 79 y.o. Chief Complaint   Patient presents with   [address] [place] [address]     2 month follow up       HPI OVERALL CHECK UP   R WRIST PAIN AND SWELLING     Review of Systems   Constitutional: Negative for diaphoresis, fatigue, fever and unexpected weight change. HENT: Positive for ear pain. Negative for congestion, ear discharge, sinus pain, sore throat and trouble swallowing. Eyes: Negative for discharge and redness. Respiratory: Negative for cough, chest tightness, shortness of breath and wheezing. Cardiovascular: Negative for chest pain, palpitations and leg swelling. Gastrointestinal: Negative for abdominal pain, constipation, diarrhea and nausea. Endocrine: Negative for polydipsia and polyuria. Genitourinary: Negative for dysuria, flank pain, frequency and urgency. Musculoskeletal: Positive for arthralgias (R WRIST SWELLING PAIN ). Negative for myalgias. Skin: Negative for rash. Allergic/Immunologic: Negative for immunocompromised state. Neurological: Positive for dizziness. Negative for syncope, numbness and headaches. Hematological: Does not bruise/bleed easily. Psychiatric/Behavioral: Negative for sleep disturbance and suicidal ideas. The patient is not nervous/anxious. Physical Exam  Vitals and nursing note reviewed. Constitutional:       Appearance: Normal appearance. She is ill-appearing. HENT:      Head: Normocephalic. Right Ear: Tympanic membrane is bulging. Left Ear: Tenderness present. Tympanic membrane is erythematous and bulging. Nose: Congestion present. Mouth/Throat:      Pharynx: Oropharynx is clear. Eyes:      Pupils: Pupils are equal, round, and reactive to light. Cardiovascular:      Rate and Rhythm: Normal rate and regular rhythm. Pulses: Normal pulses. Heart sounds: Normal heart sounds.    Pulmonary:      Effort: Pulmonary effort is normal.      Breath sounds: Normal breath sounds. No wheezing, rhonchi or rales. Abdominal:      Palpations: Abdomen is soft. Tenderness: There is no abdominal tenderness. Comments: Tenderness on palpation swelling r wrist    Musculoskeletal:      Right wrist: Swelling and tenderness present. Right hand: Tenderness present. Comments: r wrist pain and swelling tenderness to the touch    Skin:     General: Skin is warm and dry. Capillary Refill: Capillary refill takes less than 2 seconds. Neurological:      Mental Status: She is alert and oriented to person, place, and time. Psychiatric:         Mood and Affect: Mood normal.         Behavior: Behavior normal.         Assessment and Plan:  Molly Carl was seen today for discuss labs. Diagnoses and all orders for this visit:    Contusion of right wrist, initial encounter    Thyroid disease    Primary hypertension    Vitamin B12 deficiency (non anemic)    Benign paroxysmal positional vertigo, unspecified laterality    Cancer (HCC)    Sedative, hypnotic or anxiolytic use, unspecified with unspecified sedative, hypnotic or anxiolytic-induced disorder    Malignant neoplasm of retina, unspecified laterality ([facility] [address]    Contusion of right hand, initial encounter    Other orders  -     Discontinue: predniSONE (DELTASONE) 5 MG tablet; 1 PILL 3 X DAY FOR 4 DAYS THEN   1 PILL 2XDAY FOR 4 DAYS THEN   1 PILL 1 XDAY FOR 4 DAYS (Patient taking differently: 5 mg Take 1 tablet orally three times daily for 4 day, 1 tablet orally twice daily for 4 days, 1 tablet orally daily for 4 days)          Discussions/Education provided to patients during visit:  [] Discussed the importance to stop smoking. [] Advised to monitor eating habits. [x] Reviewed and discussed Imaging results. [x] Reviewed and discussed Lab results. [x] Discussed the importance of drinking plenty of fluids. [x] Cut down on Salt, Caffeine, and Sugar. [x] Continue Medications as Discussed.   [x] Communicated with patient any concerns, to phone office. REVIEWED LABS   PREDNISONE FOR WRIST   RECHECK 2 WEEKS   Patient still having depression refuses meds   We decided colonoscopy not to be done    Not wanting cholesterol med     No follow-ups on file.       Seen by:     Mark Baumann DO

## 2022-04-11 DIAGNOSIS — F41.9 ANXIETY: Primary | ICD-10-CM

## 2022-04-11 RX ORDER — LORAZEPAM 0.5 MG/1
0.5 TABLET ORAL 2 TIMES DAILY
Qty: 60 TABLET | Refills: 2 | Status: SHIPPED
Start: 2022-04-11 | End: 2022-07-01 | Stop reason: SDUPTHER

## 2022-04-11 RX ORDER — PREDNISONE 1 MG/1
TABLET ORAL
Qty: 24 TABLET | Refills: 0 | Status: SHIPPED
Start: 2022-04-11 | End: 2022-05-08

## 2022-04-11 NOTE — TELEPHONE ENCOUNTER
Patients last appointment 4/8/2022. Patients next scheduled appointment   Future Appointments   Date Time Provider Bell Maynard   4/22/2022  2:00 PM DO MATT Boateng [facility]    per patient prednisone and lorazepam did not go to the pharmacy on Friday please resend.

## 2022-04-22 ENCOUNTER — OFFICE VISIT (OUTPATIENT)
Dept: FAMILY MEDICINE CLINIC | Age: 82
End: 2022-04-22
Payer: MEDICARE

## 2022-04-22 VITALS
WEIGHT: 127.6 LBS | RESPIRATION RATE: 17 BRPM | SYSTOLIC BLOOD PRESSURE: 132 MMHG | BODY MASS INDEX: 25.05 KG/M2 | OXYGEN SATURATION: 95 % | DIASTOLIC BLOOD PRESSURE: 82 MMHG | TEMPERATURE: 98.4 F | HEART RATE: 84 BPM | HEIGHT: 60 IN

## 2022-04-22 DIAGNOSIS — H81.10 BENIGN PAROXYSMAL POSITIONAL VERTIGO, UNSPECIFIED LATERALITY: ICD-10-CM

## 2022-04-22 DIAGNOSIS — I10 PRIMARY HYPERTENSION: ICD-10-CM

## 2022-04-22 DIAGNOSIS — E78.2 MIXED HYPERLIPIDEMIA: ICD-10-CM

## 2022-04-22 DIAGNOSIS — C69.20 MALIGNANT NEOPLASM OF RETINA, UNSPECIFIED LATERALITY (HCC): ICD-10-CM

## 2022-04-22 DIAGNOSIS — E53.8 VITAMIN B12 DEFICIENCY (NON ANEMIC): ICD-10-CM

## 2022-04-22 DIAGNOSIS — S60.221A CONTUSION OF RIGHT HAND, INITIAL ENCOUNTER: ICD-10-CM

## 2022-04-22 DIAGNOSIS — E07.9 THYROID DISEASE: ICD-10-CM

## 2022-04-22 DIAGNOSIS — S60.211A CONTUSION OF RIGHT WRIST, INITIAL ENCOUNTER: Primary | ICD-10-CM

## 2022-04-22 DIAGNOSIS — C80.1 CANCER (HCC): ICD-10-CM

## 2022-04-22 DIAGNOSIS — F41.9 ANXIETY: ICD-10-CM

## 2022-04-22 PROCEDURE — G8400 PT W/DXA NO RESULTS DOC: HCPCS | Performed by: FAMILY MEDICINE

## 2022-04-22 PROCEDURE — 4040F PNEUMOC VAC/ADMIN/RCVD: CPT | Performed by: FAMILY MEDICINE

## 2022-04-22 PROCEDURE — 99213 OFFICE O/P EST LOW 20 MIN: CPT | Performed by: FAMILY MEDICINE

## 2022-04-22 PROCEDURE — 1123F ACP DISCUSS/DSCN MKR DOCD: CPT | Performed by: FAMILY MEDICINE

## 2022-04-22 PROCEDURE — G8427 DOCREV CUR MEDS BY ELIG CLIN: HCPCS | Performed by: FAMILY MEDICINE

## 2022-04-22 PROCEDURE — 1090F PRES/ABSN URINE INCON ASSESS: CPT | Performed by: FAMILY MEDICINE

## 2022-04-22 PROCEDURE — G8420 CALC BMI NORM PARAMETERS: HCPCS | Performed by: FAMILY MEDICINE

## 2022-04-22 PROCEDURE — 1036F TOBACCO NON-USER: CPT | Performed by: FAMILY MEDICINE

## 2022-04-22 NOTE — PROGRESS NOTES
22  Federico Moran : 1940 Sex: female  Age: 79 y.o. Chief Complaint   Patient presents with    Wrist Injury     2 week follow up       HPI 2 week follow up     Review of Systems   Constitutional: Negative for diaphoresis, fatigue, fever and unexpected weight change. HENT: Positive for ear pain. Negative for congestion, ear discharge, sinus pain, sore throat and trouble swallowing. Eyes: Negative for discharge and redness. Respiratory: Negative for cough, chest tightness, shortness of breath and wheezing. Cardiovascular: Negative for chest pain, palpitations and leg swelling. Gastrointestinal: Negative for abdominal pain, constipation, diarrhea and nausea. Endocrine: Negative for polydipsia and polyuria. Genitourinary: Negative for dysuria, flank pain, frequency and urgency. Musculoskeletal: Positive for arthralgias (R WRIST SWELLING PAIN ). Negative for myalgias. R wrist pain and inflamation r wrist   Has improved 50 to 60 percent   Skin: Negative for rash. Allergic/Immunologic: Negative for immunocompromised state. Neurological: Positive for dizziness. Negative for syncope, numbness and headaches. Hematological: Does not bruise/bleed easily. Psychiatric/Behavioral: Negative for sleep disturbance and suicidal ideas. The patient is not nervous/anxious. Physical Exam  Vitals and nursing note reviewed. Constitutional:       Appearance: Normal appearance. She is ill-appearing. HENT:      Head: Normocephalic. Right Ear: Tympanic membrane is bulging. Left Ear: Tenderness present. Tympanic membrane is erythematous and bulging. Mouth/Throat:      Pharynx: Oropharynx is clear. Eyes:      Pupils: Pupils are equal, round, and reactive to light. Cardiovascular:      Rate and Rhythm: Normal rate and regular rhythm. Pulses: Normal pulses. Heart sounds: Normal heart sounds.    Pulmonary:      Effort: Pulmonary effort is normal. Breath sounds: Normal breath sounds. No wheezing, rhonchi or rales. Abdominal:      Palpations: Abdomen is soft. Tenderness: There is no abdominal tenderness. Comments: Tenderness on palpation swelling r wrist    Musculoskeletal:      Right wrist: Swelling and tenderness present. Right hand: Tenderness present. Comments: r wrist pain and swelling tenderness to the touch    Skin:     General: Skin is warm and dry. Capillary Refill: Capillary refill takes less than 2 seconds. Neurological:      Mental Status: She is alert and oriented to person, place, and time. Psychiatric:         Mood and Affect: Mood normal.         Behavior: Behavior normal.         Assessment and Plan:  Caroline Jacinto was seen today for wrist injury. Diagnoses and all orders for this visit:    Contusion of right wrist, initial encounter    Cancer ([facility] [address]    Thyroid disease    Primary hypertension    Malignant neoplasm of retina, unspecified laterality ([facility] [address]    Vitamin B12 deficiency (non anemic)    Contusion of right hand, initial encounter    Benign paroxysmal positional vertigo, unspecified laterality    Anxiety    Mixed hyperlipidemia          Discussions/Education provided to patients during visit:  [] Discussed the importance to stop smoking. [] Advised to monitor eating habits. [] Reviewed and discussed Imaging results. [x] Reviewed and discussed Lab results. [x] Discussed the importance of drinking plenty of fluids. [x] Cut down on Salt, Caffeine, and Sugar. [x] Continue Medications as Discussed. [x] Communicated with patient any concerns, to phone office. Discussed xrays   Consider ortho referal       Return in about 4 weeks (around 5/20/2022).       Seen by:     Chidi Boswell DO

## 2022-04-27 ASSESSMENT — ENCOUNTER SYMPTOMS
DIARRHEA: 0
COUGH: 0
NAUSEA: 0
WHEEZING: 0
TROUBLE SWALLOWING: 0
EYE DISCHARGE: 0
CONSTIPATION: 0
CHEST TIGHTNESS: 0
SORE THROAT: 0
ABDOMINAL PAIN: 0
EYE REDNESS: 0
SHORTNESS OF BREATH: 0
SINUS PAIN: 0

## 2022-05-08 ASSESSMENT — ENCOUNTER SYMPTOMS
NAUSEA: 0
COUGH: 0
EYE DISCHARGE: 0
SINUS PAIN: 0
WHEEZING: 0
ABDOMINAL PAIN: 0
CONSTIPATION: 0
EYE REDNESS: 0
SHORTNESS OF BREATH: 0
CHEST TIGHTNESS: 0
TROUBLE SWALLOWING: 0
SORE THROAT: 0
DIARRHEA: 0

## 2022-05-23 ENCOUNTER — OFFICE VISIT (OUTPATIENT)
Dept: FAMILY MEDICINE CLINIC | Age: 82
End: 2022-05-23

## 2022-05-23 ENCOUNTER — OFFICE VISIT (OUTPATIENT)
Dept: FAMILY MEDICINE CLINIC | Age: 82
End: 2022-05-23
Payer: MEDICARE

## 2022-05-23 VITALS
HEIGHT: 60 IN | OXYGEN SATURATION: 98 % | SYSTOLIC BLOOD PRESSURE: 134 MMHG | WEIGHT: 118.5 LBS | HEART RATE: 86 BPM | BODY MASS INDEX: 23.26 KG/M2 | DIASTOLIC BLOOD PRESSURE: 76 MMHG | RESPIRATION RATE: 17 BRPM | TEMPERATURE: 98.5 F

## 2022-05-23 VITALS
HEART RATE: 86 BPM | TEMPERATURE: 98.5 F | WEIGHT: 118.5 LBS | HEIGHT: 60 IN | OXYGEN SATURATION: 98 % | BODY MASS INDEX: 23.26 KG/M2 | RESPIRATION RATE: 17 BRPM | DIASTOLIC BLOOD PRESSURE: 76 MMHG | SYSTOLIC BLOOD PRESSURE: 134 MMHG

## 2022-05-23 DIAGNOSIS — E53.8 VITAMIN B12 DEFICIENCY (NON ANEMIC): ICD-10-CM

## 2022-05-23 DIAGNOSIS — E07.9 THYROID DISEASE: Primary | ICD-10-CM

## 2022-05-23 DIAGNOSIS — C69.20 MALIGNANT NEOPLASM OF RETINA, UNSPECIFIED LATERALITY (HCC): ICD-10-CM

## 2022-05-23 DIAGNOSIS — H57.9 UNSPECIFIED DISORDER OF EYE AND ADNEXA: ICD-10-CM

## 2022-05-23 DIAGNOSIS — Z91.81 AT HIGH RISK FOR FALLS: ICD-10-CM

## 2022-05-23 DIAGNOSIS — I10 PRIMARY HYPERTENSION: ICD-10-CM

## 2022-05-23 DIAGNOSIS — F41.9 ANXIETY: ICD-10-CM

## 2022-05-23 DIAGNOSIS — Z00.00 MEDICARE ANNUAL WELLNESS VISIT, SUBSEQUENT: Primary | ICD-10-CM

## 2022-05-23 DIAGNOSIS — M81.0 AGE-RELATED OSTEOPOROSIS WITHOUT CURRENT PATHOLOGICAL FRACTURE: ICD-10-CM

## 2022-05-23 DIAGNOSIS — C80.1 CANCER (HCC): ICD-10-CM

## 2022-05-23 DIAGNOSIS — H81.10 BENIGN PAROXYSMAL POSITIONAL VERTIGO, UNSPECIFIED LATERALITY: ICD-10-CM

## 2022-05-23 DIAGNOSIS — B02.30 HERPES ZOSTER WITH OPHTHALMIC COMPLICATION, UNSPECIFIED HERPES ZOSTER EYE DISEASE: ICD-10-CM

## 2022-05-23 DIAGNOSIS — E78.2 MIXED HYPERLIPIDEMIA: ICD-10-CM

## 2022-05-23 DIAGNOSIS — S60.221A CONTUSION OF RIGHT HAND, INITIAL ENCOUNTER: ICD-10-CM

## 2022-05-23 PROCEDURE — 99213 OFFICE O/P EST LOW 20 MIN: CPT | Performed by: FAMILY MEDICINE

## 2022-05-23 PROCEDURE — G8400 PT W/DXA NO RESULTS DOC: HCPCS | Performed by: FAMILY MEDICINE

## 2022-05-23 PROCEDURE — 1036F TOBACCO NON-USER: CPT | Performed by: FAMILY MEDICINE

## 2022-05-23 PROCEDURE — G0439 PPPS, SUBSEQ VISIT: HCPCS | Performed by: FAMILY MEDICINE

## 2022-05-23 PROCEDURE — G8427 DOCREV CUR MEDS BY ELIG CLIN: HCPCS | Performed by: FAMILY MEDICINE

## 2022-05-23 PROCEDURE — 1123F ACP DISCUSS/DSCN MKR DOCD: CPT | Performed by: FAMILY MEDICINE

## 2022-05-23 PROCEDURE — 1090F PRES/ABSN URINE INCON ASSESS: CPT | Performed by: FAMILY MEDICINE

## 2022-05-23 PROCEDURE — G8420 CALC BMI NORM PARAMETERS: HCPCS | Performed by: FAMILY MEDICINE

## 2022-05-23 SDOH — ECONOMIC STABILITY: FOOD INSECURITY: WITHIN THE PAST 12 MONTHS, THE FOOD YOU BOUGHT JUST DIDN'T LAST AND YOU DIDN'T HAVE MONEY TO GET MORE.: NEVER TRUE

## 2022-05-23 SDOH — ECONOMIC STABILITY: FOOD INSECURITY: WITHIN THE PAST 12 MONTHS, YOU WORRIED THAT YOUR FOOD WOULD RUN OUT BEFORE YOU GOT MONEY TO BUY MORE.: NEVER TRUE

## 2022-05-23 ASSESSMENT — ENCOUNTER SYMPTOMS
PHOTOPHOBIA: 0
FACIAL SWELLING: 0
COUGH: 0
ABDOMINAL PAIN: 0
ABDOMINAL DISTENTION: 0
SORE THROAT: 0
RECTAL PAIN: 0
VOICE CHANGE: 0
VOMITING: 0
SHORTNESS OF BREATH: 0
WHEEZING: 0
RHINORRHEA: 0
APNEA: 0
SINUS PAIN: 0
NAUSEA: 0
CONSTIPATION: 0
DIARRHEA: 0
CHEST TIGHTNESS: 0
BACK PAIN: 1

## 2022-05-23 ASSESSMENT — PATIENT HEALTH QUESTIONNAIRE - PHQ9
5. POOR APPETITE OR OVEREATING: 3
8. MOVING OR SPEAKING SO SLOWLY THAT OTHER PEOPLE COULD HAVE NOTICED. OR THE OPPOSITE, BEING SO FIGETY OR RESTLESS THAT YOU HAVE BEEN MOVING AROUND A LOT MORE THAN USUAL: 3
SUM OF ALL RESPONSES TO PHQ QUESTIONS 1-9: 13
3. TROUBLE FALLING OR STAYING ASLEEP: 3
9. THOUGHTS THAT YOU WOULD BE BETTER OFF DEAD, OR OF HURTING YOURSELF: 0
1. LITTLE INTEREST OR PLEASURE IN DOING THINGS: 0
4. FEELING TIRED OR HAVING LITTLE ENERGY: 0
2. FEELING DOWN, DEPRESSED OR HOPELESS: 3
SUM OF ALL RESPONSES TO PHQ QUESTIONS 1-9: 13
6. FEELING BAD ABOUT YOURSELF - OR THAT YOU ARE A FAILURE OR HAVE LET YOURSELF OR YOUR FAMILY DOWN: 1
SUM OF ALL RESPONSES TO PHQ QUESTIONS 1-9: 13
10. IF YOU CHECKED OFF ANY PROBLEMS, HOW DIFFICULT HAVE THESE PROBLEMS MADE IT FOR YOU TO DO YOUR WORK, TAKE CARE OF THINGS AT HOME, OR GET ALONG WITH OTHER PEOPLE: 0
SUM OF ALL RESPONSES TO PHQ QUESTIONS 1-9: 13
SUM OF ALL RESPONSES TO PHQ9 QUESTIONS 1 & 2: 3
7. TROUBLE CONCENTRATING ON THINGS, SUCH AS READING THE NEWSPAPER OR WATCHING TELEVISION: 0

## 2022-05-23 ASSESSMENT — SOCIAL DETERMINANTS OF HEALTH (SDOH): HOW HARD IS IT FOR YOU TO PAY FOR THE VERY BASICS LIKE FOOD, HOUSING, MEDICAL CARE, AND HEATING?: NOT HARD AT ALL

## 2022-05-23 ASSESSMENT — LIFESTYLE VARIABLES: HOW OFTEN DO YOU HAVE A DRINK CONTAINING ALCOHOL: NEVER

## 2022-05-23 NOTE — PROGRESS NOTES
Medicare Annual Wellness Visit    Dhruv Rangel is here for Medicare AWV    Assessment & Plan   Medicare annual wellness visit, subsequent      Recommendations for Preventive Services Due: see orders and patient instructions/AVS.  Recommended screening schedule for the next 5-10 years is provided to the patient in written form: see Patient Instructions/AVS.     Return for Medicare Annual Wellness Visit in 1 year. Subjective   {OPTIONAL FOR THIS VISIT TYPE - TO BE USED IF ADDRESSING AN ACUTE OR CHRONIC PROBLEM (THIS WILL AUTO-DELETE IF NOT USED):[id number]::\"The following acute and/or chronic problems were also addressed today    Patient's complete Health Risk Assessment and screening values have been reviewed and are found in Flowsheets. The following problems were reviewed today and where indicated follow up appointments were made and/or referrals ordered.     Positive Risk Factor Screenings with Interventions:    Fall Risk:  Do you feel unsteady or are you worried about falling? : (!) yes  2 or more falls in past year?: (!) yes  Fall with injury in past year?: (!) yes     Fall Risk Interventions:  Discussed all alternatives on falling ,she is very active       Depression Interventions:  ·  We have been adressing this for years ,very bad home situation with 's health various meds have been tried she declines any other intervention or doctor visit            [address] and ACP:  General  In general, how would you say your health is?: Fair  In the past 7 days, have you experienced any of the following: New or Increased Pain, New or Increased Fatigue, Loneliness, Social Isolation, Stress or Anger?: (!) Yes  Select all that apply: (!) Loneliness  Do you get the social and emotional support that you need?: Yes  Do you have a Living Will?: Yes    Advance Directives     Power of  Living Will ACP-Advance Directive ACP-Power of     Not on File [place] on 12/21/11 Filed Not on File General Health Risk Interventions: declines any intervention      Health Habits/Nutrition:     Physical Activity: Sufficiently Active    Days of Exercise per Week: 7 days    Minutes of Exercise per Session: 30 min     Have you lost any weight without trying in the past 3 months?: (!) Yes  Body mass index: 23.14  Have you seen the dentist within the past year?: (!) No    Health Habits/Nutrition Interventions: Patient denies any intervention     Hearing/Vision:  Do you or your family notice any trouble with your hearing that hasn't been managed with hearing aids?: (!) Yes  Do you have difficulty driving, watching TV, or doing any of your daily activities because of your eyesight?: (!) Yes  Have you had an eye exam within the past year?: Yes  No exam data present    Hearing/Vision Interventions: declines     Safety:  Do you have working smoke detectors?: Yes  Do you have any tripping hazards - loose or unsecured carpets or rugs?: No  Do you have any tripping hazards - clutter in doorways, halls, or stairs?: No  Do you have either shower bars, grab bars, non-slip mats or non-slip surfaces in your shower or bathtub?: Yes  Do all of your stairways have a railing or banister?: Yes  Do you always fasten your seatbelt when you are in a car?: (!) No    Safety Interventions:  · Patient declines any further evaluation/treatment for this issue           Objective   Vitals:    05/23/22 0945   BP: 134/76   Site: Left Upper Arm   Position: Sitting   Cuff Size: Medium Adult   Pulse: 86   Resp: 17   Temp: 98.5 °F (36.9 °C)   TempSrc: Temporal   SpO2: 98%   Weight: 118 lb 8 oz (53.8 kg)   Height: 5' (1.524 m)      Body mass index is 23.14 kg/m². Allergies   Allergen Reactions    Adhesive Tape     Pravachol [Pravastatin]     Toprol Xl [Metoprolol]     Zithromax [Azithromycin]      Prior to Visit Medications    Medication Sig Taking?  Authorizing Provider   LORazepam (ATIVAN) 0.5 MG tablet Take 1 tablet by mouth 2 times daily for 90 days.  TAKE 1 TABLET BY MOUTH TWICE A DAY Yes Viktor Little DO   tobramycin-dexamethasone (TOBRADEX) 0.3-0.1 % ophthalmic suspension instill 1 drop INTO AFFECTED EYE(S) four times a day for 2 weeks Yes Historical Provider, MD   Propylene Glycol (SYSTANE BALANCE OP) Apply 1 drop to eye Daily 1 drop in left eye in AM and before bed Yes Historical Provider, MD   ketotifen (ALAWAY) 0.025 % ophthalmic solution Place 1 drop into the right eye daily  Yes Historical Provider, MD   sertraline (ZOLOFT) 25 MG tablet Take 1 tablet by mouth daily Taking 1/2 tab nightly  Cinthya Gaspar, APRN - [facility] (Including outside providers/suppliers regularly involved in providing care):   Patient Care Team:  Ovi Wells DO as PCP - [place] [id number] PERICO Little DO as PCP - [facility] Empaneled Provider     Reviewed and updated this visit:  Tobacco  Allergies  Meds  Problems  Med Hx  Surg Hx  Soc Hx  Fam Hx

## 2022-05-23 NOTE — PROGRESS NOTES
22  Macy Raza : 1940 Sex: female  Age: 79 y.o. Chief Complaint   Patient presents with    Medication Check     1 month follow up       HPI overall check up   mutiple complaints about he difficulties with life     Review of Systems   Constitutional: Negative for appetite change, chills, diaphoresis, fatigue, fever and unexpected weight change. HENT: Negative for congestion, dental problem, ear discharge, ear pain, facial swelling, hearing loss, mouth sores, rhinorrhea, sinus pain, sore throat, tinnitus and voice change. Eyes: Positive for visual disturbance (oe eye ). Negative for photophobia. Respiratory: Negative for apnea, cough, chest tightness, shortness of breath and wheezing. Cardiovascular: Negative for chest pain and palpitations. Gastrointestinal: Negative for abdominal distention, abdominal pain, constipation, diarrhea, nausea, rectal pain and vomiting. Endocrine: Negative for cold intolerance, heat intolerance and polyuria. Genitourinary: Negative for dysuria, frequency, hematuria and urgency. Musculoskeletal: Positive for arthralgias (r wridst pain ) and back pain. Negative for gait problem, joint swelling, myalgias, neck pain and neck stiffness. Skin: Negative for rash and wound. Allergic/Immunologic: Negative for environmental allergies. Neurological: Positive for headaches (on occasion ). Negative for dizziness, tremors, seizures, syncope, facial asymmetry, speech difficulty, weakness, light-headedness and numbness. Hematological: Does not bruise/bleed easily. Psychiatric/Behavioral: Negative for agitation, hallucinations and suicidal ideas. The patient is not nervous/anxious. Physical Exam  Vitals and nursing note reviewed. Constitutional:       Appearance: Normal appearance. She is ill-appearing. HENT:      Head: Normocephalic. Right Ear: Tympanic membrane is bulging. Left Ear: Tenderness present.  Tympanic membrane is erythematous and bulging. Mouth/Throat:      Pharynx: Oropharynx is clear. Eyes:      Pupils: Pupils are equal, round, and reactive to light. Comments: Artificial eye    Cardiovascular:      Rate and Rhythm: Normal rate and regular rhythm. Pulses: Normal pulses. Heart sounds: Normal heart sounds. Pulmonary:      Effort: Pulmonary effort is normal.      Breath sounds: Normal breath sounds. No wheezing, rhonchi or rales. Abdominal:      Palpations: Abdomen is soft. Tenderness: There is no abdominal tenderness. Comments: Tenderness on palpation swelling r wrist    Musculoskeletal:      Right wrist: Swelling and tenderness present. Right hand: Tenderness present. Comments: Multiple arthrritis areas r arm    Skin:     General: Skin is warm and dry. Capillary Refill: Capillary refill takes less than 2 seconds. Neurological:      Mental Status: She is alert and oriented to person, place, and time. Motor: Weakness present. Psychiatric:         Mood and Affect: Mood normal.         Behavior: Behavior normal.         Assessment and Plan:  Jacques Phipps was seen today for medication check. Diagnoses and all orders for this visit:    Thyroid disease    Primary hypertension    Malignant neoplasm of retina, unspecified laterality ([address]    At high risk for falls    Vitamin B12 deficiency (non anemic)    Cancer (HCC)    Contusion of right hand, initial encounter    Herpes zoster with ophthalmic complication, unspecified herpes zoster eye disease    Unspecified disorder of eye and adnexa    Mixed hyperlipidemia    Age-related osteoporosis without current pathological fracture    Benign paroxysmal positional vertigo, unspecified laterality    Anxiety          Discussions/Education provided to patients during visit:  [] Discussed the importance to stop smoking. [] Advised to monitor eating habits. [] Reviewed and discussed Imaging results.   [] Reviewed and discussed Lab results. [x] Discussed the importance of drinking plenty of fluids. [x] Cut down on Salt, Caffeine, and Sugar. [x] Continue Medications as Discussed. [x] Communicated with patient any concerns, to phone office. Discussed multiple areas of of her life ,her problems with ,son ,cats etc multiple emotional problems  Discussed meds for aanxiety and depression        No follow-ups on file. Seen by:     Jonathan Simmons DO        On the basis of positive falls risk screening, assessment and plan is as follows: she refuses all intervention on falls .

## 2022-05-23 NOTE — PATIENT INSTRUCTIONS
Personalized Preventive Plan for Federico Moran - 5/23/2022  Medicare offers a range of preventive health benefits. Some of the tests and screenings are paid in full while other may be subject to a deductible, co-insurance, and/or copay. Some of these benefits include a comprehensive review of your medical history including lifestyle, illnesses that may run in your family, and various assessments and screenings as appropriate. After reviewing your medical record and screening and assessments performed today your provider may have ordered immunizations, labs, imaging, and/or referrals for you. A list of these orders (if applicable) as well as your Preventive Care list are included within your After Visit Summary for your review. Other Preventive Recommendations:    · A preventive eye exam performed by an eye specialist is recommended every 1-2 years to screen for glaucoma; cataracts, macular degeneration, and other eye disorders. · A preventive dental visit is recommended every 6 months. · Try to get at least 150 minutes of exercise per week or 10,000 steps per day on a pedometer . · Order or download the FREE \"Exercise & Physical Activity: Your Everyday Guide\" from [employer]. Call 7-973.373.9826 or search [employer] online. · You need 3511-5739 mg of calcium and 8388-4481 IU of vitamin D per day. It is possible to meet your calcium requirement with diet alone, but a vitamin D supplement is usually necessary to meet this goal.  · When exposed to the sun, use a sunscreen that protects against both UVA and UVB radiation with an SPF of 30 or greater. Reapply every 2 to 3 hours or after sweating, drying off with a towel, or swimming. · Always wear a seat belt when traveling in a car. Always wear a helmet when riding a bicycle or motorcycle.

## 2022-06-14 RX ORDER — SERTRALINE HYDROCHLORIDE 25 MG/1
25 TABLET, FILM COATED ORAL DAILY
Qty: 45 TABLET | Refills: 1 | Status: SHIPPED
Start: 2022-06-14 | End: 2022-07-01 | Stop reason: SDUPTHER

## 2022-06-14 NOTE — TELEPHONE ENCOUNTER
Patients last appointment 5/23/2022.   Patients next scheduled appointment   Future Appointments   Date Time Provider Bell Maynard   7/1/2022  9:30 AM DO MATT [facility]

## 2022-07-01 ENCOUNTER — OFFICE VISIT (OUTPATIENT)
Dept: FAMILY MEDICINE CLINIC | Age: 82
End: 2022-07-01
Payer: MEDICARE

## 2022-07-01 VITALS
OXYGEN SATURATION: 97 % | WEIGHT: 117 LBS | DIASTOLIC BLOOD PRESSURE: 70 MMHG | TEMPERATURE: 97.5 F | BODY MASS INDEX: 22.97 KG/M2 | HEIGHT: 60 IN | HEART RATE: 66 BPM | RESPIRATION RATE: 18 BRPM | SYSTOLIC BLOOD PRESSURE: 130 MMHG

## 2022-07-01 DIAGNOSIS — H65.91 OTHER NONSUPPURATIVE OTITIS MEDIA OF RIGHT EAR, UNSPECIFIED CHRONICITY: ICD-10-CM

## 2022-07-01 DIAGNOSIS — E07.9 THYROID DISEASE: ICD-10-CM

## 2022-07-01 DIAGNOSIS — F41.9 ANXIETY: ICD-10-CM

## 2022-07-01 DIAGNOSIS — J40 BRONCHITIS: ICD-10-CM

## 2022-07-01 DIAGNOSIS — M81.0 AGE-RELATED OSTEOPOROSIS WITHOUT CURRENT PATHOLOGICAL FRACTURE: ICD-10-CM

## 2022-07-01 DIAGNOSIS — E78.2 MIXED HYPERLIPIDEMIA: ICD-10-CM

## 2022-07-01 DIAGNOSIS — R05.9 COUGH: Primary | ICD-10-CM

## 2022-07-01 DIAGNOSIS — H65.92 LEFT NON-SUPPURATIVE OTITIS MEDIA: ICD-10-CM

## 2022-07-01 LAB
Lab: NORMAL
QC PASS/FAIL: NORMAL
SARS-COV-2 RDRP RESP QL NAA+PROBE: NEGATIVE

## 2022-07-01 PROCEDURE — G8427 DOCREV CUR MEDS BY ELIG CLIN: HCPCS | Performed by: FAMILY MEDICINE

## 2022-07-01 PROCEDURE — G8420 CALC BMI NORM PARAMETERS: HCPCS | Performed by: FAMILY MEDICINE

## 2022-07-01 PROCEDURE — G8400 PT W/DXA NO RESULTS DOC: HCPCS | Performed by: FAMILY MEDICINE

## 2022-07-01 PROCEDURE — 1123F ACP DISCUSS/DSCN MKR DOCD: CPT | Performed by: FAMILY MEDICINE

## 2022-07-01 PROCEDURE — 99213 OFFICE O/P EST LOW 20 MIN: CPT | Performed by: FAMILY MEDICINE

## 2022-07-01 PROCEDURE — 1090F PRES/ABSN URINE INCON ASSESS: CPT | Performed by: FAMILY MEDICINE

## 2022-07-01 PROCEDURE — 1036F TOBACCO NON-USER: CPT | Performed by: FAMILY MEDICINE

## 2022-07-01 PROCEDURE — 87635 SARS-COV-2 COVID-19 AMP PRB: CPT | Performed by: FAMILY MEDICINE

## 2022-07-01 RX ORDER — LORAZEPAM 0.5 MG/1
0.5 TABLET ORAL 2 TIMES DAILY
Qty: 60 TABLET | Refills: 2 | Status: SHIPPED
Start: 2022-07-01 | End: 2022-08-08 | Stop reason: SDUPTHER

## 2022-07-01 RX ORDER — AMOXICILLIN AND CLAVULANATE POTASSIUM 875; 125 MG/1; MG/1
1 TABLET, FILM COATED ORAL 2 TIMES DAILY
Qty: 20 TABLET | Refills: 0 | Status: SHIPPED | OUTPATIENT
Start: 2022-07-01 | End: 2022-07-11

## 2022-07-01 RX ORDER — SERTRALINE HYDROCHLORIDE 25 MG/1
25 TABLET, FILM COATED ORAL DAILY
Qty: 45 TABLET | Refills: 1 | Status: SHIPPED
Start: 2022-07-01 | End: 2022-08-29

## 2022-07-01 SDOH — ECONOMIC STABILITY: FOOD INSECURITY: WITHIN THE PAST 12 MONTHS, YOU WORRIED THAT YOUR FOOD WOULD RUN OUT BEFORE YOU GOT MONEY TO BUY MORE.: NEVER TRUE

## 2022-07-01 SDOH — ECONOMIC STABILITY: FOOD INSECURITY: WITHIN THE PAST 12 MONTHS, THE FOOD YOU BOUGHT JUST DIDN'T LAST AND YOU DIDN'T HAVE MONEY TO GET MORE.: NEVER TRUE

## 2022-07-01 ASSESSMENT — ENCOUNTER SYMPTOMS
BACK PAIN: 1
COUGH: 1
SINUS PAIN: 0
FACIAL SWELLING: 0
SORE THROAT: 0
DIARRHEA: 0
CONSTIPATION: 0
ABDOMINAL PAIN: 0
WHEEZING: 1
VOMITING: 0
RHINORRHEA: 0
SHORTNESS OF BREATH: 1
NAUSEA: 0
PHOTOPHOBIA: 0
APNEA: 0
CHOKING: 1
ABDOMINAL DISTENTION: 0
RECTAL PAIN: 0
CHEST TIGHTNESS: 0
VOICE CHANGE: 0

## 2022-07-01 ASSESSMENT — ANXIETY QUESTIONNAIRES
3. WORRYING TOO MUCH ABOUT DIFFERENT THINGS: 3
2. NOT BEING ABLE TO STOP OR CONTROL WORRYING: 3
GAD7 TOTAL SCORE: 15
1. FEELING NERVOUS, ANXIOUS, OR ON EDGE: 3
7. FEELING AFRAID AS IF SOMETHING AWFUL MIGHT HAPPEN: 1
4. TROUBLE RELAXING: 2
IF YOU CHECKED OFF ANY PROBLEMS ON THIS QUESTIONNAIRE, HOW DIFFICULT HAVE THESE PROBLEMS MADE IT FOR YOU TO DO YOUR WORK, TAKE CARE OF THINGS AT HOME, OR GET ALONG WITH OTHER PEOPLE: VERY DIFFICULT
5. BEING SO RESTLESS THAT IT IS HARD TO SIT STILL: 3
6. BECOMING EASILY ANNOYED OR IRRITABLE: 0

## 2022-07-01 ASSESSMENT — PATIENT HEALTH QUESTIONNAIRE - PHQ9
SUM OF ALL RESPONSES TO PHQ QUESTIONS 1-9: 10
2. FEELING DOWN, DEPRESSED OR HOPELESS: 3
10. IF YOU CHECKED OFF ANY PROBLEMS, HOW DIFFICULT HAVE THESE PROBLEMS MADE IT FOR YOU TO DO YOUR WORK, TAKE CARE OF THINGS AT HOME, OR GET ALONG WITH OTHER PEOPLE: 2
SUM OF ALL RESPONSES TO PHQ QUESTIONS 1-9: 10
SUM OF ALL RESPONSES TO PHQ9 QUESTIONS 1 & 2: 6
7. TROUBLE CONCENTRATING ON THINGS, SUCH AS READING THE NEWSPAPER OR WATCHING TELEVISION: 0
3. TROUBLE FALLING OR STAYING ASLEEP: 0
9. THOUGHTS THAT YOU WOULD BE BETTER OFF DEAD, OR OF HURTING YOURSELF: 0
SUM OF ALL RESPONSES TO PHQ QUESTIONS 1-9: 10
SUM OF ALL RESPONSES TO PHQ QUESTIONS 1-9: 10
5. POOR APPETITE OR OVEREATING: 3
1. LITTLE INTEREST OR PLEASURE IN DOING THINGS: 3
6. FEELING BAD ABOUT YOURSELF - OR THAT YOU ARE A FAILURE OR HAVE LET YOURSELF OR YOUR FAMILY DOWN: 0
4. FEELING TIRED OR HAVING LITTLE ENERGY: 1
8. MOVING OR SPEAKING SO SLOWLY THAT OTHER PEOPLE COULD HAVE NOTICED. OR THE OPPOSITE, BEING SO FIGETY OR RESTLESS THAT YOU HAVE BEEN MOVING AROUND A LOT MORE THAN USUAL: 0

## 2022-07-01 ASSESSMENT — SOCIAL DETERMINANTS OF HEALTH (SDOH): HOW HARD IS IT FOR YOU TO PAY FOR THE VERY BASICS LIKE FOOD, HOUSING, MEDICAL CARE, AND HEATING?: NOT HARD AT ALL

## 2022-07-01 NOTE — PROGRESS NOTES
22  Denton Johnson : 1940 Sex: female  Age: 79 y.o. Chief Complaint   Patient presents with    Depression     sick she loss her good friend her neighbor, no appetite,cring alot     Medication Refill       HPIfeeling badly ncreased depression   Coughing  A lot going on 1 week   Putting on vicks     Review of Systems   Constitutional: Negative for appetite change, chills, diaphoresis, fatigue, fever and unexpected weight change. HENT: Negative for congestion, dental problem, ear discharge, ear pain, facial swelling, hearing loss, mouth sores, rhinorrhea, sinus pain, sore throat, tinnitus and voice change. Eyes: Positive for visual disturbance (oe eye ). Negative for photophobia. Respiratory: Positive for cough (x 5 days worsening ), choking (last night ), shortness of breath and wheezing. Negative for apnea and chest tightness. Cardiovascular: Negative for chest pain and palpitations. Gastrointestinal: Negative for abdominal distention, abdominal pain, constipation, diarrhea, nausea, rectal pain and vomiting. Endocrine: Negative for cold intolerance, heat intolerance and polyuria. Genitourinary: Negative for dysuria, frequency, hematuria and urgency. Musculoskeletal: Positive for arthralgias and back pain. Negative for gait problem, joint swelling, myalgias, neck pain and neck stiffness. Skin: Negative for rash and wound. Allergic/Immunologic: Negative for environmental allergies. Neurological: Positive for headaches (on occasion ). Negative for dizziness, tremors, seizures, syncope, facial asymmetry, speech difficulty, weakness, light-headedness and numbness. Hematological: Does not bruise/bleed easily. Psychiatric/Behavioral: Negative for agitation, hallucinations and suicidal ideas. The patient is nervous/anxious. Depressed with deaths in the family          Physical Exam  Vitals and nursing note reviewed. Constitutional:       Appearance: Normal appearance. provided to patients during visit:  [] Discussed the importance to stop smoking. [] Advised to monitor eating habits. [] Reviewed and discussed Imaging results. [] Reviewed and discussed Lab results. [] Discussed the importance of drinking plenty of fluids. [] Cut down on Salt, Caffeine, and Sugar. [x] Continue Medications as Discussed. [x] Communicated with patient any concerns, to phone office. augmentin 875 bid 10 days  Robitussin dm   Chest x ray   Discussed depression         No follow-ups on file.       Seen by:     Arielle Read DO

## 2022-08-05 ENCOUNTER — TELEPHONE (OUTPATIENT)
Dept: FAMILY MEDICINE CLINIC | Age: 82
End: 2022-08-05

## 2022-08-05 NOTE — TELEPHONE ENCOUNTER
Fell down 10 steps, mostly on her butt and left side. C/o pain in both wrists, left knee, left ankle, did NOT hit her head. Pain is only what is to be expected after a fall. Pt. Had appt. . with Dr. Judith Suárez this Monday. Does Dr. Judith Suárez need to see her today? She is OK waiting until Monday.

## 2022-08-05 NOTE — TELEPHONE ENCOUNTER
Per Dr. Sanjana Hatch, if she isn't hurting bad anywhere, she can just see him Monday. If hurting he will see her today. Ginny Hoffman she said she's ok to wait and see Dr. Sanjana Hatch this Monday.     Advise if get in pain this weekend will need to go to ER, [facility] cannot do x-rays on the weekends

## 2022-08-08 ENCOUNTER — OFFICE VISIT (OUTPATIENT)
Dept: FAMILY MEDICINE CLINIC | Age: 82
End: 2022-08-08
Payer: MEDICARE

## 2022-08-08 VITALS
HEART RATE: 77 BPM | RESPIRATION RATE: 18 BRPM | OXYGEN SATURATION: 97 % | TEMPERATURE: 99.9 F | SYSTOLIC BLOOD PRESSURE: 122 MMHG | BODY MASS INDEX: 22.58 KG/M2 | DIASTOLIC BLOOD PRESSURE: 72 MMHG | HEIGHT: 60 IN | WEIGHT: 115 LBS

## 2022-08-08 DIAGNOSIS — F41.9 ANXIETY: ICD-10-CM

## 2022-08-08 PROCEDURE — 99213 OFFICE O/P EST LOW 20 MIN: CPT | Performed by: FAMILY MEDICINE

## 2022-08-08 PROCEDURE — 1090F PRES/ABSN URINE INCON ASSESS: CPT | Performed by: FAMILY MEDICINE

## 2022-08-08 PROCEDURE — G8420 CALC BMI NORM PARAMETERS: HCPCS | Performed by: FAMILY MEDICINE

## 2022-08-08 PROCEDURE — 1036F TOBACCO NON-USER: CPT | Performed by: FAMILY MEDICINE

## 2022-08-08 PROCEDURE — G8427 DOCREV CUR MEDS BY ELIG CLIN: HCPCS | Performed by: FAMILY MEDICINE

## 2022-08-08 PROCEDURE — 1123F ACP DISCUSS/DSCN MKR DOCD: CPT | Performed by: FAMILY MEDICINE

## 2022-08-08 PROCEDURE — G8400 PT W/DXA NO RESULTS DOC: HCPCS | Performed by: FAMILY MEDICINE

## 2022-08-08 RX ORDER — LORAZEPAM 0.5 MG/1
0.5 TABLET ORAL 2 TIMES DAILY
Qty: 60 TABLET | Refills: 2 | Status: SHIPPED | OUTPATIENT
Start: 2022-08-08 | End: 2022-09-09

## 2022-08-08 SDOH — ECONOMIC STABILITY: FOOD INSECURITY: WITHIN THE PAST 12 MONTHS, YOU WORRIED THAT YOUR FOOD WOULD RUN OUT BEFORE YOU GOT MONEY TO BUY MORE.: NEVER TRUE

## 2022-08-08 SDOH — ECONOMIC STABILITY: FOOD INSECURITY: WITHIN THE PAST 12 MONTHS, THE FOOD YOU BOUGHT JUST DIDN'T LAST AND YOU DIDN'T HAVE MONEY TO GET MORE.: NEVER TRUE

## 2022-08-08 ASSESSMENT — ENCOUNTER SYMPTOMS
ABDOMINAL DISTENTION: 0
CHEST TIGHTNESS: 0
APNEA: 0
SINUS PAIN: 0
SORE THROAT: 0
SHORTNESS OF BREATH: 0
CONSTIPATION: 0
FACIAL SWELLING: 0
RECTAL PAIN: 0
WHEEZING: 0
VOICE CHANGE: 0
NAUSEA: 0
ABDOMINAL PAIN: 0
BACK PAIN: 0
COUGH: 1
DIARRHEA: 0
VOMITING: 0
RHINORRHEA: 0
PHOTOPHOBIA: 0

## 2022-08-08 ASSESSMENT — SOCIAL DETERMINANTS OF HEALTH (SDOH): HOW HARD IS IT FOR YOU TO PAY FOR THE VERY BASICS LIKE FOOD, HOUSING, MEDICAL CARE, AND HEATING?: NOT HARD AT ALL

## 2022-08-08 NOTE — PROGRESS NOTES
22  Amaury Hollis : 1940 Sex: female  Age: 79 y.o. Chief Complaint   Patient presents with    Fall     Patient Case Marshall on 22 she hurt her knee and hands ankle and her back ,she hit each step with her lower back. Did not go get checked    Fatigue     Feels very fatigue today        HPI  overall  check up   Fall down steps   Post bronchitis     Review of Systems   Constitutional:  Negative for appetite change, chills, diaphoresis, fatigue, fever and unexpected weight change. HENT:  Negative for congestion, dental problem, ear discharge, ear pain, facial swelling, hearing loss, mouth sores, rhinorrhea, sinus pain, sore throat, tinnitus and voice change. Eyes:  Negative for photophobia and visual disturbance (no visual changes). Respiratory:  Positive for cough (residual co.ugh). Negative for apnea, chest tightness, shortness of breath and wheezing. Cardiovascular:  Negative for chest pain and palpitations. Gastrointestinal:  Negative for abdominal distention, abdominal pain, constipation, diarrhea, nausea, rectal pain and vomiting. Endocrine: Negative for cold intolerance, heat intolerance and polyuria. Genitourinary:  Negative for dysuria, frequency, hematuria and urgency. Musculoskeletal:  Positive for arthralgias (left ankle left knee pain from falll,joint pain). Negative for back pain, gait problem, joint swelling, myalgias, neck pain and neck stiffness. Skin:  Negative for rash and wound. Allergic/Immunologic: Negative for environmental allergies. Neurological:  Negative for dizziness, tremors, seizures, syncope, facial asymmetry, speech difficulty, weakness, light-headedness, numbness and headaches. Hematological:  Does not bruise/bleed easily. Psychiatric/Behavioral:  Negative for agitation, hallucinations and suicidal ideas. The patient is not nervous/anxious. Physical Exam  Vitals and nursing note reviewed.    Constitutional:       Appearance: Normal appearance. She is ill-appearing. HENT:      Head: Normocephalic. Right Ear: Tympanic membrane is bulging. Left Ear: Tenderness present. Tympanic membrane is erythematous and bulging. Mouth/Throat:      Pharynx: Oropharynx is clear. Eyes:      Pupils: Pupils are equal, round, and reactive to light. Comments: Artificial eye    Cardiovascular:      Rate and Rhythm: Normal rate and regular rhythm. Pulses: Normal pulses. Heart sounds: Normal heart sounds. Pulmonary:      Effort: Pulmonary effort is normal.      Breath sounds: Normal breath sounds. No wheezing, rhonchi or rales. Abdominal:      Palpations: Abdomen is soft. Tenderness: There is no abdominal tenderness. Comments: Tenderness on palpation swelling r wrist    Musculoskeletal:      Right wrist: Swelling and tenderness present. Right hand: Tenderness present. Comments: Left ankle and knee    Skin:     General: Skin is warm and dry. Capillary Refill: Capillary refill takes less than 2 seconds. Coloration: Skin is not pale. Neurological:      Mental Status: She is alert and oriented to person, place, and time. Motor: Weakness present. Coordination: Coordination normal.   Psychiatric:         Mood and Affect: Mood normal.         Behavior: Behavior normal.       Assessment and Plan:  Korina Man was seen today for fall and fatigue. Diagnoses and all orders for this visit:    Anxiety  -     LORazepam (ATIVAN) 0.5 MG tablet; Take 1 tablet by mouth in the morning and 1 tablet before bedtime. Do all this for 30 days. TAKE 1 TABLET BY MOUTH TWICE A DAY. Discussions/Education provided to patients during visit:  [] Discussed the importance to stop smoking. [] Advised to monitor eating habits. [] Reviewed and discussed Imaging results. Even  [x] Reviewed and discussed Lab results. [x] Discussed the importance of drinking plenty of fluids.   [x] Cut down on Salt, Caffeine, and Sugar.  [] Non Compliant Patient   [x] Communicated with patient any concerns, to phone office. She had a fall and some bumps and bruises but I do not think it was a really significant fall even though she did fall down the steps I do not think she hurt her self too badly right now she does not want to take any x-rays of any area  Recheck 1 month      . No follow-ups on file.       Seen by:     Marisabel Moreno, DO

## 2022-08-29 RX ORDER — SERTRALINE HYDROCHLORIDE 25 MG/1
TABLET, FILM COATED ORAL
Qty: 45 TABLET | Refills: 1 | Status: SHIPPED | OUTPATIENT
Start: 2022-08-29

## 2022-09-09 ENCOUNTER — OFFICE VISIT (OUTPATIENT)
Dept: FAMILY MEDICINE CLINIC | Age: 82
End: 2022-09-09
Payer: MEDICARE

## 2022-09-09 VITALS
SYSTOLIC BLOOD PRESSURE: 120 MMHG | WEIGHT: 116 LBS | HEART RATE: 75 BPM | BODY MASS INDEX: 22.78 KG/M2 | OXYGEN SATURATION: 98 % | HEIGHT: 60 IN | TEMPERATURE: 97.8 F | DIASTOLIC BLOOD PRESSURE: 80 MMHG | RESPIRATION RATE: 18 BRPM

## 2022-09-09 DIAGNOSIS — R05.9 COUGH: Primary | ICD-10-CM

## 2022-09-09 DIAGNOSIS — I10 PRIMARY HYPERTENSION: ICD-10-CM

## 2022-09-09 DIAGNOSIS — M19.90 ARTHRITIS: ICD-10-CM

## 2022-09-09 DIAGNOSIS — B02.30 HERPES ZOSTER WITH OPHTHALMIC COMPLICATION, UNSPECIFIED HERPES ZOSTER EYE DISEASE: ICD-10-CM

## 2022-09-09 DIAGNOSIS — E07.9 THYROID DISEASE: ICD-10-CM

## 2022-09-09 DIAGNOSIS — H81.10 BENIGN PAROXYSMAL POSITIONAL VERTIGO, UNSPECIFIED LATERALITY: ICD-10-CM

## 2022-09-09 DIAGNOSIS — M81.0 AGE-RELATED OSTEOPOROSIS WITHOUT CURRENT PATHOLOGICAL FRACTURE: ICD-10-CM

## 2022-09-09 DIAGNOSIS — C80.1 CANCER (HCC): ICD-10-CM

## 2022-09-09 DIAGNOSIS — C69.20 MALIGNANT NEOPLASM OF RETINA, UNSPECIFIED LATERALITY (HCC): ICD-10-CM

## 2022-09-09 DIAGNOSIS — R91.8 PULMONARY NODULES: ICD-10-CM

## 2022-09-09 DIAGNOSIS — E78.2 MIXED HYPERLIPIDEMIA: ICD-10-CM

## 2022-09-09 DIAGNOSIS — E53.8 VITAMIN B12 DEFICIENCY (NON ANEMIC): ICD-10-CM

## 2022-09-09 DIAGNOSIS — F41.9 ANXIETY: ICD-10-CM

## 2022-09-09 PROCEDURE — 1123F ACP DISCUSS/DSCN MKR DOCD: CPT | Performed by: FAMILY MEDICINE

## 2022-09-09 PROCEDURE — G8427 DOCREV CUR MEDS BY ELIG CLIN: HCPCS | Performed by: FAMILY MEDICINE

## 2022-09-09 PROCEDURE — G8400 PT W/DXA NO RESULTS DOC: HCPCS | Performed by: FAMILY MEDICINE

## 2022-09-09 PROCEDURE — G8420 CALC BMI NORM PARAMETERS: HCPCS | Performed by: FAMILY MEDICINE

## 2022-09-09 PROCEDURE — 1090F PRES/ABSN URINE INCON ASSESS: CPT | Performed by: FAMILY MEDICINE

## 2022-09-09 PROCEDURE — 1036F TOBACCO NON-USER: CPT | Performed by: FAMILY MEDICINE

## 2022-09-09 PROCEDURE — 99213 OFFICE O/P EST LOW 20 MIN: CPT | Performed by: FAMILY MEDICINE

## 2022-09-09 RX ORDER — AZITHROMYCIN 250 MG/1
250 TABLET, FILM COATED ORAL SEE ADMIN INSTRUCTIONS
Qty: 6 TABLET | Refills: 0 | Status: SHIPPED | OUTPATIENT
Start: 2022-09-09 | End: 2022-09-14

## 2022-09-09 RX ORDER — PREDNISONE 10 MG/1
10 TABLET ORAL DAILY
Qty: 10 TABLET | Refills: 0 | Status: SHIPPED | OUTPATIENT
Start: 2022-09-09 | End: 2022-09-19

## 2022-09-09 ASSESSMENT — ENCOUNTER SYMPTOMS
COUGH: 1
SHORTNESS OF BREATH: 1

## 2022-09-09 NOTE — PROGRESS NOTES
22  Kailyn Munoz : 1940 Sex: female  Age: 79 y.o. Chief Complaint   Patient presents with    Cough     Had a cough her last visit and still has it at night     Anxiety       HPI  coug and not sleeping due to cough     Review of Systems   Constitutional:  Negative for appetite change, chills, diaphoresis, fatigue, fever and unexpected weight change. HENT:  Negative for congestion, dental problem, ear discharge, ear pain, facial swelling, hearing loss, mouth sores, rhinorrhea, sinus pain, sore throat, tinnitus and voice change. Eyes:  Negative for photophobia and visual disturbance (no visual changes). Respiratory:  Positive for cough (persistant disturbing cough) and shortness of breath. Negative for apnea, chest tightness and wheezing. Cardiovascular:  Negative for chest pain and palpitations. Gastrointestinal:  Negative for abdominal distention, abdominal pain, constipation, diarrhea, nausea, rectal pain and vomiting. Endocrine: Negative for cold intolerance, heat intolerance and polyuria. Genitourinary:  Negative for dysuria, frequency, hematuria and urgency. Musculoskeletal:  Positive for arthralgias (see knee hands). Negative for back pain, gait problem, joint swelling, myalgias, neck pain and neck stiffness. Skin:  Negative for rash and wound. Allergic/Immunologic: Negative for environmental allergies. Neurological:  Positive for light-headedness. Negative for dizziness, tremors, seizures, syncope, facial asymmetry, speech difficulty, weakness, numbness and headaches. Hematological:  Does not bruise/bleed easily. Psychiatric/Behavioral:  Negative for agitation, hallucinations and suicidal ideas. The patient is not nervous/anxious. Physical Exam  Vitals and nursing note reviewed. Constitutional:       Appearance: Normal appearance. She is ill-appearing. HENT:      Head: Normocephalic. Right Ear: Tympanic membrane is bulging.       Left Ear: Tenderness present. Tympanic membrane is erythematous and bulging. Mouth/Throat:      Pharynx: Oropharynx is clear. Eyes:      Pupils: Pupils are equal, round, and reactive to light. Comments: Artificial eye    Cardiovascular:      Rate and Rhythm: Normal rate and regular rhythm. Pulses: Normal pulses. Heart sounds: Normal heart sounds. Pulmonary:      Effort: Pulmonary effort is normal.      Breath sounds: Normal breath sounds. No wheezing, rhonchi or rales. Abdominal:      Palpations: Abdomen is soft. Tenderness: There is no abdominal tenderness. Comments: Tenderness on palpation swelling r wrist    Musculoskeletal:      Right wrist: Swelling and tenderness present. Right hand: Tenderness present. Comments: Left ankle and knee    Skin:     General: Skin is warm and dry. Capillary Refill: Capillary refill takes less than 2 seconds. Coloration: Skin is not pale. Neurological:      Mental Status: She is alert and oriented to person, place, and time. Motor: Weakness present. Coordination: Coordination normal.   Psychiatric:         Mood and Affect: Mood normal.         Behavior: Behavior normal.       Assessment and Plan:  Jimmy Belcher was seen today for cough and anxiety. Diagnoses and all orders for this visit:    Cough  -     azithromycin (ZITHROMAX) 250 MG tablet;  Take 1 tablet by mouth See Admin Instructions for 5 days 500mg on day 1 followed by 250mg on days 2 - 5    Anxiety    Mixed hyperlipidemia    Age-related osteoporosis without current pathological fracture    Malignant neoplasm of retina, unspecified laterality (HCC)    Vitamin B12 deficiency (non anemic)    Pulmonary nodules    Arthritis    Thyroid disease    Primary hypertension    Cancer (HCC)    Herpes zoster with ophthalmic complication, unspecified herpes zoster eye disease    Benign paroxysmal positional vertigo, unspecified laterality    Other orders  -     predniSONE (DELTASONE) 10 MG tablet; Take 1 tablet by mouth daily for 10 days        Discussions/Education provided to patients during visit:  [] Discussed the importance to stop smoking. [] Advised to monitor eating habits. [] Reviewed and discussed Imaging results. [x] Reviewed and discussed Lab results. [x] Discussed the importance of drinking plenty of fluids. [x] Cut down on Salt, Caffeine, and Sugar.  [] Non Compliant Patient   [x] Communicated with patient any concerns, to phone office. Zithromax and prednisone for the cough situation  She is an emotional distraught situation all the time because of her  and because of the fact that she has to self cath all the time and because of the fact that he is in terrible condition and she has to pick them up from the floor when he falls and they have various problems going on and she is always extremely distal were distraught and has extreme problems with everything that we talked about when it comes to taking care of her  or take care of her house etc. we have tried multiple antidepressants and multiple medication she is only taking what she is taking right now on her list and that is all she is going to take for any of these problems. Recheck 1 month      Time spent face to face with patient discussing medications and labs: 20 minutes. No follow-ups on file.       Seen by:     Maddy Azar DO

## 2022-09-13 ENCOUNTER — APPOINTMENT (OUTPATIENT)
Dept: CT IMAGING | Age: 82
End: 2022-09-13
Payer: MEDICARE

## 2022-09-13 ENCOUNTER — HOSPITAL ENCOUNTER (EMERGENCY)
Age: 82
Discharge: HOME | End: 2022-09-13
Attending: STUDENT IN AN ORGANIZED HEALTH CARE EDUCATION/TRAINING PROGRAM
Payer: MEDICARE

## 2022-09-13 ENCOUNTER — APPOINTMENT (OUTPATIENT)
Dept: GENERAL RADIOLOGY | Age: 82
End: 2022-09-13
Payer: MEDICARE

## 2022-09-13 VITALS
HEART RATE: 78 BPM | TEMPERATURE: 98.4 F | DIASTOLIC BLOOD PRESSURE: 58 MMHG | RESPIRATION RATE: 18 BRPM | BODY MASS INDEX: 22.07 KG/M2 | OXYGEN SATURATION: 92 % | WEIGHT: 113 LBS | SYSTOLIC BLOOD PRESSURE: 103 MMHG

## 2022-09-13 DIAGNOSIS — R06.02 SHORTNESS OF BREATH: Primary | ICD-10-CM

## 2022-09-13 LAB
ALBUMIN SERPL-MCNC: 3.5 G/DL (ref 3.5–5.2)
ALP BLD-CCNC: 64 U/L (ref 35–104)
ALT SERPL-CCNC: 13 U/L (ref 0–32)
ANION GAP SERPL CALCULATED.3IONS-SCNC: 10 MMOL/L (ref 7–16)
AST SERPL-CCNC: 19 U/L (ref 0–31)
BASOPHILS ABSOLUTE: 0.07 E9/L (ref 0–0.2)
BASOPHILS RELATIVE PERCENT: 0.4 % (ref 0–2)
BILIRUB SERPL-MCNC: 0.5 MG/DL (ref 0–1.2)
BUN BLDV-MCNC: 12 MG/DL (ref 6–23)
CALCIUM SERPL-MCNC: 8.5 MG/DL (ref 8.6–10.2)
CHLORIDE BLD-SCNC: 101 MMOL/L (ref 98–107)
CO2: 23 MMOL/L (ref 22–29)
CREAT SERPL-MCNC: 0.6 MG/DL (ref 0.5–1)
EOSINOPHILS ABSOLUTE: 0.02 E9/L (ref 0.05–0.5)
EOSINOPHILS RELATIVE PERCENT: 0.1 % (ref 0–6)
GFR AFRICAN AMERICAN: >60
GFR NON-AFRICAN AMERICAN: >60 ML/MIN/1.73
GLUCOSE BLD-MCNC: 118 MG/DL (ref 74–99)
HCT VFR BLD CALC: 38.3 % (ref 34–48)
HEMOGLOBIN: 12.4 G/DL (ref 11.5–15.5)
IMMATURE GRANULOCYTES #: 0.08 E9/L
IMMATURE GRANULOCYTES %: 0.4 % (ref 0–5)
LYMPHOCYTES ABSOLUTE: 1.07 E9/L (ref 1.5–4)
LYMPHOCYTES RELATIVE PERCENT: 5.6 % (ref 20–42)
MCH RBC QN AUTO: 28.4 PG (ref 26–35)
MCHC RBC AUTO-ENTMCNC: 32.4 % (ref 32–34.5)
MCV RBC AUTO: 87.6 FL (ref 80–99.9)
MONOCYTES ABSOLUTE: 0.53 E9/L (ref 0.1–0.95)
MONOCYTES RELATIVE PERCENT: 2.8 % (ref 2–12)
NEUTROPHILS ABSOLUTE: 17.3 E9/L (ref 1.8–7.3)
NEUTROPHILS RELATIVE PERCENT: 90.7 % (ref 43–80)
PDW BLD-RTO: 14.3 FL (ref 11.5–15)
PLATELET # BLD: 310 E9/L (ref 130–450)
PMV BLD AUTO: 9.4 FL (ref 7–12)
POTASSIUM REFLEX MAGNESIUM: 4.1 MMOL/L (ref 3.5–5)
PRO-BNP: 109 PG/ML (ref 0–450)
RBC # BLD: 4.37 E12/L (ref 3.5–5.5)
SARS-COV-2, NAAT: NOT DETECTED
SODIUM BLD-SCNC: 134 MMOL/L (ref 132–146)
TOTAL PROTEIN: 7.1 G/DL (ref 6.4–8.3)
TROPONIN, HIGH SENSITIVITY: 7 NG/L (ref 0–9)
WBC # BLD: 19.1 E9/L (ref 4.5–11.5)

## 2022-09-13 PROCEDURE — 84484 ASSAY OF TROPONIN QUANT: CPT

## 2022-09-13 PROCEDURE — 71260 CT THORAX DX C+: CPT

## 2022-09-13 PROCEDURE — 85025 COMPLETE CBC W/AUTO DIFF WBC: CPT

## 2022-09-13 PROCEDURE — 99285 EMERGENCY DEPT VISIT HI MDM: CPT

## 2022-09-13 PROCEDURE — 83880 ASSAY OF NATRIURETIC PEPTIDE: CPT

## 2022-09-13 PROCEDURE — 80053 COMPREHEN METABOLIC PANEL: CPT

## 2022-09-13 PROCEDURE — 71045 X-RAY EXAM CHEST 1 VIEW: CPT

## 2022-09-13 PROCEDURE — 6360000004 HC RX CONTRAST MEDICATION: Performed by: RADIOLOGY

## 2022-09-13 PROCEDURE — 93005 ELECTROCARDIOGRAM TRACING: CPT | Performed by: STUDENT IN AN ORGANIZED HEALTH CARE EDUCATION/TRAINING PROGRAM

## 2022-09-13 PROCEDURE — 87635 SARS-COV-2 COVID-19 AMP PRB: CPT

## 2022-09-13 RX ADMIN — IOPAMIDOL 60 ML: 755 INJECTION, SOLUTION INTRAVENOUS at 22:24

## 2022-09-13 ASSESSMENT — PAIN - FUNCTIONAL ASSESSMENT: PAIN_FUNCTIONAL_ASSESSMENT: NONE - DENIES PAIN

## 2022-09-14 ASSESSMENT — ENCOUNTER SYMPTOMS
PHOTOPHOBIA: 0
COUGH: 1
NAUSEA: 0
VOICE CHANGE: 0
TROUBLE SWALLOWING: 0
SHORTNESS OF BREATH: 1
ABDOMINAL PAIN: 0
VOMITING: 0
DIARRHEA: 0

## 2022-09-14 NOTE — ED PROVIDER NOTES
HPI   Patient is a 50-year-old female with past medical history of vertigo, hypertension, hyperlipidemia, anxiety, thyroid disease and unspecified cancer not on active treatment presenting the emergency department due to worsening shortness of breath. Patient symptoms started acutely tonight. She was brought in by EMS from home for evaluation. Patient was noting that she was in the kitchen preparing a meal when she became acutely short of breath. She denies any inciting factors for her symptoms. No hypoxia noted by EMS. Patient was placed on therapeutic oxygen in route to the ED. Bedside SPO2 appropriate on room air on initial evaluation in the emergency department. Of note, patient was seen by her PCP and diagnosed with bronchitis. She has apparently been having a cough and congestion over the last month. Patient is currently on steroids for her bronchitis. Her shortness of breath appears to be improved since coming to the ED. Patient appeared in no acute respiratory stress on initial evaluation. She is breathing comfortably on room air. Son is at bedside and stated the patient may have been very anxious when she started feeling short of breath at home. Patient is otherwise denying other symptoms including fever, nausea, vomiting, diaphoresis, chest pain, abdominal pain, lightheadedness, headache, syncope, urinary symptoms, constipation or diarrhea. She does states she has intermittent chills. No recent sick contacts noted. Symptoms are mild to moderate with no known inciting factors. She endorsed sudden onset of symptoms which are improving since coming to the ED. Review of Systems   Constitutional:  Negative for chills, fatigue and fever. HENT:  Negative for congestion, trouble swallowing and voice change. Eyes:  Negative for photophobia and visual disturbance. Respiratory:  Positive for cough and shortness of breath. Cardiovascular:  Negative for chest pain and palpitations. Gastrointestinal:  Negative for abdominal pain, diarrhea, nausea and vomiting. Genitourinary:  Negative for dysuria, flank pain, frequency and urgency. Musculoskeletal:  Negative for arthralgias. Skin:  Negative for rash and wound. Neurological:  Negative for dizziness, weakness, numbness and headaches. Psychiatric/Behavioral:  Negative for behavioral problems and confusion. Physical Exam  Constitutional:       General: She is not in acute distress. Appearance: Normal appearance. She is not ill-appearing. Comments: Elderly and frail-appearing. HENT:      Head: Normocephalic and atraumatic. Mouth/Throat:      Mouth: Mucous membranes are moist.      Pharynx: Oropharynx is clear. Eyes:      Pupils: Pupils are equal, round, and reactive to light. Cardiovascular:      Rate and Rhythm: Normal rate and regular rhythm. Pulses: Normal pulses. Heart sounds: Normal heart sounds. Pulmonary:      Effort: Pulmonary effort is normal. No tachypnea or respiratory distress. Breath sounds: Decreased breath sounds and rhonchi present. No wheezing or rales. Comments: Diminished breath sounds globally. Mild to moderate scattered rhonchi. No wheezes or rales noted. Abdominal:      Palpations: Abdomen is soft. Tenderness: There is no abdominal tenderness. There is no guarding or rebound. Musculoskeletal:         General: Normal range of motion. Cervical back: Normal range of motion and neck supple. Right lower leg: No tenderness. No edema. Left lower leg: No tenderness. No edema. Skin:     General: Skin is warm and dry. Capillary Refill: Capillary refill takes less than 2 seconds. Neurological:      General: No focal deficit present. Mental Status: She is alert and oriented to person, place, and time. Cranial Nerves: No cranial nerve deficit. Coordination: Coordination normal.   Psychiatric:      Comments: Patient slightly anxious. Procedures   EKG: This EKG is signed and interpreted by me. Normal sinus rhythm with ventricular rate of 70 bpm.  Parable normal.  QTc not prolonged. No evidence of acute ST elevation MI. No significant changes compared to previous EKG. MDM  Patient is a 74-year-old female with past medical history of vertigo, hypertension, hyperlipidemia, anxiety, thyroid disease and unspecified cancer not on active treatment presenting the emergency department due to worsening shortness of breath. On initial evaluation, patient appeared in no acute respiratory distress. No nasal flaring, grunting, accessory muscle use or tachypnea noted. Patient was breathing comfortably on room air with appropriate bedside SPO2 on monitor. Physical exam essentially unremarkable. Patient did have mild lateral rhonchi with no wheezes or rales noted. Work-up in the emergency department generally unremarkable. No clinically significant lab abnormalities noted. Patient COVID-negative. Chest x-ray noting \"increased perihilar lung markings from prior comparison concerning for  central congestion versus peribronchial inflammatory process\". CT chest obtained for further evaluation and negative for acute cardiopulmonary processes. Patient remained stable with no hypoxia while in the emergency department. Work-up and results discussed with patient and son who are agreeable to discharge with outpatient follow-up as needed. Given return precautions in case of new or worsening symptoms. ED Course as of 10/18/22 1003   Tue Sep 13, 2022   2013 ATTENDING PROVIDER ATTESTATION:     I have personally performed and/or participated in the history, exam, medical decision making, and procedures and agree with all pertinent clinical information unless otherwise noted. I have also reviewed and agree with the past medical, family and social history unless otherwise noted.     I have discussed this patient in detail with the resident, and provided the instruction and education regarding the patient. My findings/plan: This is an 26-year-old female with history of vertigo hypertension, hyperlipidemia, anxiety presents for evaluation of shortness of breath. States that she has been having cough and congestion for the last month. She was seen by her PCP and was diagnosed with bronchitis. Notes that today prior to arrival she started feeling more short of breath. Son is present at bedside states that she began to break out. Patient with EMS, however they placed her on submental oxygen for symptomatic support. On exam she is sitting up in bed no acute distress. Heart regular rate and rhythm  Lungs are auscultation bilaterally  Abdomen soft, nondistended, nontender. There is no pitting edema bilateral lower extremities. Plan for labs, itching, supportive care. I have reviewed ekg and agree with resident interpretation     [BB]   2331 Repeat /58 before discharge. [PP]      ED Course User Index  [BB] Crystal Glez DO  [PP] Jose A Gaitan DO      ED Course as of 10/18/22 1003   Tue Sep 13, 2022   2013 ATTENDING PROVIDER ATTESTATION:     I have personally performed and/or participated in the history, exam, medical decision making, and procedures and agree with all pertinent clinical information unless otherwise noted. I have also reviewed and agree with the past medical, family and social history unless otherwise noted. I have discussed this patient in detail with the resident, and provided the instruction and education regarding the patient. My findings/plan: This is an 26-year-old female with history of vertigo hypertension, hyperlipidemia, anxiety presents for evaluation of shortness of breath. States that she has been having cough and congestion for the last month. She was seen by her PCP and was diagnosed with bronchitis. Notes that today prior to arrival she started feeling more short of breath.   Son is present at bedside states that she began to break out. Patient with EMS, however they placed her on submental oxygen for symptomatic support. On exam she is sitting up in bed no acute distress. Heart regular rate and rhythm  Lungs are auscultation bilaterally  Abdomen soft, nondistended, nontender. There is no pitting edema bilateral lower extremities. Plan for labs, itching, supportive care. I have reviewed ekg and agree with resident interpretation     [BB]   2331 Repeat /58 before discharge. [PP]      ED Course User Index  [BB] Huber Whiting DO  [PP] Drew Zhao DO       --------------------------------------------- PAST HISTORY ---------------------------------------------  Past Medical History:  has a past medical history of Anxiety, Arthritis, Cancer ([facility] [address] Depression, Eye disorder, Hives, Hyperlipidemia, Hypertension, Measles, Melanocytoma of retina, Osteoporosis, Thyroid disease, Vitamin B12 deficiency (non anemic), and Whooping cough. Past Surgical History:  has a past surgical history that includes joint replacement; eye surgery; Carpal tunnel release; Tubal ligation; Uterine Suspension; Total shoulder arthroplasty (Right, 12/22/2014); Eye surgery (Left); Carpal tunnel release (Left); Total shoulder arthroplasty (Bilateral); Thyroidectomy; and Dilation and curettage of uterus. Social History:  reports that she has never smoked. She has never used smokeless tobacco. She reports that she does not drink alcohol and does not use drugs. Family History: family history includes Breast Cancer in her niece; Cancer in her brother; Diabetes in her brother; Heart Attack in her brother; Heart Failure in her brother; High Blood Pressure in her mother and sister; High Cholesterol in her brother and sister; Other in her brother and sister. The patients home medications have been reviewed.     Allergies: Adhesive tape, Pravachol [pravastatin], Toprol xl [metoprolol], and Zithromax [azithromycin]    -------------------------------------------------- RESULTS -------------------------------------------------  Labs:  Results for orders placed or performed during the hospital encounter of 09/13/22   COVID-19, Rapid    Specimen: Nasopharyngeal Swab   Result Value Ref Range    SARS-CoV-2, NAAT Not Detected Not Detected   CBC with Auto Differential   Result Value Ref Range    WBC 19.1 (H) 4.5 - 11.5 E9/L    RBC 4.37 3.50 - 5.50 E12/L    Hemoglobin 12.4 11.5 - 15.5 g/dL    Hematocrit 38.3 34.0 - 48.0 %    MCV 87.6 80.0 - 99.9 fL    MCH 28.4 26.0 - 35.0 pg    MCHC 32.4 32.0 - 34.5 %    RDW 14.3 11.5 - 15.0 fL    Platelets 048 552 - 030 E9/L    MPV 9.4 7.0 - 12.0 fL    Neutrophils % 90.7 (H) 43.0 - 80.0 %    Immature Granulocytes % 0.4 0.0 - 5.0 %    Lymphocytes % 5.6 (L) 20.0 - 42.0 %    Monocytes % 2.8 2.0 - 12.0 %    Eosinophils % 0.1 0.0 - 6.0 %    Basophils % 0.4 0.0 - 2.0 %    Neutrophils Absolute 17.30 (H) 1.80 - 7.30 E9/L    Immature Granulocytes # 0.08 E9/L    Lymphocytes Absolute 1.07 (L) 1.50 - 4.00 E9/L    Monocytes Absolute 0.53 0.10 - 0.95 E9/L    Eosinophils Absolute 0.02 (L) 0.05 - 0.50 E9/L    Basophils Absolute 0.07 0.00 - 0.20 E9/L   Comprehensive Metabolic Panel w/ Reflex to MG   Result Value Ref Range    Sodium 134 132 - 146 mmol/L    Potassium reflex Magnesium 4.1 3.5 - 5.0 mmol/L    Chloride 101 98 - 107 mmol/L    CO2 23 22 - 29 mmol/L    Anion Gap 10 7 - 16 mmol/L    Glucose 118 (H) 74 - 99 mg/dL    BUN 12 6 - 23 mg/dL    Creatinine 0.6 0.5 - 1.0 mg/dL    GFR Non-African American >60 >=60 mL/min/1.73    GFR African American >60     Calcium 8.5 (L) 8.6 - 10.2 mg/dL    Total Protein 7.1 6.4 - 8.3 g/dL    Albumin 3.5 3.5 - 5.2 g/dL    Total Bilirubin 0.5 0.0 - 1.2 mg/dL    Alkaline Phosphatase 64 35 - 104 U/L    ALT 13 0 - 32 U/L    AST 19 0 - 31 U/L   Troponin   Result Value Ref Range    Troponin, High Sensitivity 7 0 - 9 ng/L   Brain Natriuretic Peptide   Result Value Ref Range Pro- 0 - 450 pg/mL   EKG 12 Lead   Result Value Ref Range    Ventricular Rate 78 BPM    Atrial Rate 78 BPM    P-R Interval 130 ms    QRS Duration 74 ms    Q-T Interval 356 ms    QTc Calculation (Bazett) 405 ms    P Axis 45 degrees    R Axis 3 degrees    T Axis 75 degrees       Radiology:  CT CHEST W CONTRAST   Final Result   No acute intrathoracic abnormality. XR CHEST PORTABLE   Final Result   Increased perihilar lung markings from prior comparison concerning for   central congestion versus peribronchial inflammatory process greatest on the   right. No overt edema or pleural effusion.             ------------------------- NURSING NOTES AND VITALS REVIEWED ---------------------------  Date / Time Roomed:  9/13/2022  7:53 PM  ED Bed Assignment:  21/21    The nursing notes within the ED encounter and vital signs as below have been reviewed. BP (!) 103/58   Pulse 78   Temp 98.4 °F (36.9 °C) (Oral)   Resp 18   Wt 113 lb (51.3 kg)   SpO2 92%   BMI 22.07 kg/m²   Oxygen Saturation Interpretation: Normal      ------------------------------------------ PROGRESS NOTES ------------------------------------------  I have spoken with the patient and discussed todays results, in addition to providing specific details for the plan of care and counseling regarding the diagnosis and prognosis. Their questions are answered at this time and they are agreeable with the plan. I discussed at length with them reasons for immediate return here for re evaluation. They will followup with primary care by calling their office tomorrow. --------------------------------- ADDITIONAL PROVIDER NOTES ---------------------------------  At this time the patient is without objective evidence of an acute process requiring hospitalization or inpatient management. They have remained hemodynamically stable throughout their entire ED visit and are stable for discharge with outpatient follow-up.      The plan has been discussed in detail and they are aware of the specific conditions for emergent return, as well as the importance of follow-up. Discharge Medication List as of 9/13/2022 11:24 PM          Diagnosis:  1. Shortness of breath        Disposition:  Patient's disposition: Discharge to home  Patient's condition is stable.             WILD JONATHAN [facility], DO  Resident  09/14/22 0314       Lee Avalos,   10/18/22 1003

## 2022-09-15 ENCOUNTER — TELEPHONE (OUTPATIENT)
Dept: FAMILY MEDICINE CLINIC | Age: 82
End: 2022-09-15

## 2022-09-15 NOTE — TELEPHONE ENCOUNTER
----- Message from Sylwia Dawkins sent at 9/15/2022 10:01 AM EDT -----  Subject: Message to Provider    QUESTIONS  Information for Provider? Patient was seen in the ED of [address] [place] [address] on   9/13 for shakes, chills, shortness of breath, weakness and low BP. Is this   any interaction with the Prednisone that you prescribed? Patient is still   feeling too week for an office visit for ED f/u which this writer   suggested she do. Can you please call the patient back to  on what   the best next steps are? Patient's last OV was 9/9. Also patient didn't   get her After Visit Summary from 9/9, can she (or her son) pick it up at   some point?  ---------------------------------------------------------------------------  --------------  [id number]  [phone]; OK to leave message on voicemail  ---------------------------------------------------------------------------  --------------  SCRIPT ANSWERS  Relationship to Patient?  Self

## 2022-09-16 ENCOUNTER — TELEPHONE (OUTPATIENT)
Dept: FAMILY MEDICINE CLINIC | Age: 82
End: 2022-09-16

## 2022-09-16 LAB
EKG ATRIAL RATE: 78 BPM
EKG P AXIS: 45 DEGREES
EKG P-R INTERVAL: 130 MS
EKG Q-T INTERVAL: 356 MS
EKG QRS DURATION: 74 MS
EKG QTC CALCULATION (BAZETT): 405 MS
EKG R AXIS: 3 DEGREES
EKG T AXIS: 75 DEGREES
EKG VENTRICULAR RATE: 78 BPM

## 2022-09-16 NOTE — TELEPHONE ENCOUNTER
Dr. Callie Camejo said he does not believe the list of symptoms she sent in would be from the prednisone. So continue with that. She went to ER and was evaluated, so that is good they did not find anything major wrong. I printed out her AVS so she or someone can pick it up for her. She will call back later to make appt. For follow up from ER.  Did not want to make one now

## 2022-09-16 NOTE — TELEPHONE ENCOUNTER
----- Message from Kassandra Manrique sent at 9/15/2022 10:01 AM EDT -----  Subject: Message to Provider    QUESTIONS  Information for Provider? Patient was seen in the ED of [address] [place] [address] on   9/13 for shakes, chills, shortness of breath, weakness and low BP. Is this   any interaction with the Prednisone that you prescribed? Patient is still   feeling too week for an office visit for ED f/u which this writer   suggested she do. Can you please call the patient back to  on what   the best next steps are? Patient's last OV was 9/9. Also patient didn't   get her After Visit Summary from 9/9, can she (or her son) pick it up at   some point?  ---------------------------------------------------------------------------  --------------  [id number]  [phone]; OK to leave message on voicemail  ---------------------------------------------------------------------------  --------------  SCRIPT ANSWERS  Relationship to Patient?  Self

## 2022-09-22 ENCOUNTER — OFFICE VISIT (OUTPATIENT)
Dept: FAMILY MEDICINE CLINIC | Age: 82
End: 2022-09-22
Payer: MEDICARE

## 2022-09-22 VITALS
OXYGEN SATURATION: 94 % | DIASTOLIC BLOOD PRESSURE: 64 MMHG | HEART RATE: 86 BPM | TEMPERATURE: 98.3 F | SYSTOLIC BLOOD PRESSURE: 102 MMHG

## 2022-09-22 DIAGNOSIS — H81.10 BENIGN PAROXYSMAL POSITIONAL VERTIGO, UNSPECIFIED LATERALITY: ICD-10-CM

## 2022-09-22 DIAGNOSIS — Z91.81 AT HIGH RISK FOR FALLS: ICD-10-CM

## 2022-09-22 DIAGNOSIS — C69.20 MALIGNANT NEOPLASM OF RETINA, UNSPECIFIED LATERALITY (HCC): ICD-10-CM

## 2022-09-22 DIAGNOSIS — E53.8 VITAMIN B12 DEFICIENCY (NON ANEMIC): ICD-10-CM

## 2022-09-22 DIAGNOSIS — M81.0 AGE-RELATED OSTEOPOROSIS WITHOUT CURRENT PATHOLOGICAL FRACTURE: ICD-10-CM

## 2022-09-22 DIAGNOSIS — R91.8 PULMONARY NODULES: ICD-10-CM

## 2022-09-22 DIAGNOSIS — C80.1 CANCER (HCC): ICD-10-CM

## 2022-09-22 DIAGNOSIS — E07.9 THYROID DISEASE: ICD-10-CM

## 2022-09-22 DIAGNOSIS — E78.2 MIXED HYPERLIPIDEMIA: ICD-10-CM

## 2022-09-22 DIAGNOSIS — F41.9 ANXIETY: ICD-10-CM

## 2022-09-22 DIAGNOSIS — R42 DIZZINESS: ICD-10-CM

## 2022-09-22 DIAGNOSIS — R53.1 WEAKNESS: Primary | ICD-10-CM

## 2022-09-22 PROCEDURE — 99213 OFFICE O/P EST LOW 20 MIN: CPT | Performed by: NURSE PRACTITIONER

## 2022-09-22 PROCEDURE — G8427 DOCREV CUR MEDS BY ELIG CLIN: HCPCS | Performed by: NURSE PRACTITIONER

## 2022-09-22 PROCEDURE — 1123F ACP DISCUSS/DSCN MKR DOCD: CPT | Performed by: NURSE PRACTITIONER

## 2022-09-22 PROCEDURE — G8400 PT W/DXA NO RESULTS DOC: HCPCS | Performed by: NURSE PRACTITIONER

## 2022-09-22 PROCEDURE — 1090F PRES/ABSN URINE INCON ASSESS: CPT | Performed by: NURSE PRACTITIONER

## 2022-09-22 PROCEDURE — G8420 CALC BMI NORM PARAMETERS: HCPCS | Performed by: NURSE PRACTITIONER

## 2022-09-22 PROCEDURE — 1036F TOBACCO NON-USER: CPT | Performed by: NURSE PRACTITIONER

## 2022-09-22 ASSESSMENT — ENCOUNTER SYMPTOMS
TROUBLE SWALLOWING: 0
APNEA: 0
BLOOD IN STOOL: 0
SORE THROAT: 0
EYE DISCHARGE: 0
VOMITING: 0
VOICE CHANGE: 0
SINUS PAIN: 0
BACK PAIN: 0
COUGH: 0
COLOR CHANGE: 0
SHORTNESS OF BREATH: 0
DIARRHEA: 0
CONSTIPATION: 0
PHOTOPHOBIA: 0
WHEEZING: 0
CHEST TIGHTNESS: 0
CHOKING: 0
STRIDOR: 0
EYE ITCHING: 0
SINUS PRESSURE: 0
FACIAL SWELLING: 0
EYE REDNESS: 0
ABDOMINAL PAIN: 0
RECTAL PAIN: 0
RHINORRHEA: 0
EYE PAIN: 0
NAUSEA: 0
ANAL BLEEDING: 0
ABDOMINAL DISTENTION: 0

## 2022-09-22 NOTE — PROGRESS NOTES
22  Stoney Lutz : 1940 Sex: female  Age: 79 y.o. Chief Complaint   Patient presents with    Blood Pressure Check     She states she has been having some low blood pressure, also been feeling fatigue        Patient states she was at the bank and was feeling weak so she came across the street to our office to have her blood pressure checked since she has been running some low blood pressures. She states she does get a little dizzy once in a while if she gets up too fast. No chest pain. No shortness of breath. No syncope. No diaphoresis. Review of Systems   Constitutional:  Negative for activity change, appetite change, chills, diaphoresis, fatigue, fever and unexpected weight change. HENT:  Negative for congestion, dental problem, drooling, ear discharge, ear pain, facial swelling, hearing loss, mouth sores, nosebleeds, postnasal drip, rhinorrhea, sinus pressure, sinus pain, sneezing, sore throat, tinnitus, trouble swallowing and voice change. Eyes:  Negative for photophobia, pain, discharge, redness, itching and visual disturbance. Respiratory:  Negative for apnea, cough, choking, chest tightness, shortness of breath, wheezing and stridor. Cardiovascular:  Negative for chest pain, palpitations and leg swelling. Gastrointestinal:  Negative for abdominal distention, abdominal pain, anal bleeding, blood in stool, constipation, diarrhea, nausea, rectal pain and vomiting. Endocrine: Negative for cold intolerance, heat intolerance, polydipsia, polyphagia and polyuria. Genitourinary:  Negative for decreased urine volume, difficulty urinating, dysuria, enuresis, flank pain, frequency, genital sores, hematuria and urgency. Musculoskeletal:  Negative for arthralgias, back pain, gait problem, joint swelling, myalgias, neck pain and neck stiffness. Skin:  Negative for color change, pallor, rash and wound.    Allergic/Immunologic: Negative for environmental allergies, food allergies and immunocompromised state. Neurological:  Positive for dizziness, weakness and light-headedness. Negative for tremors, seizures, syncope, facial asymmetry, speech difficulty, numbness and headaches. Hematological:  Negative for adenopathy. Does not bruise/bleed easily. Psychiatric/Behavioral:  Negative for agitation, behavioral problems, confusion, decreased concentration, hallucinations, self-injury, sleep disturbance and suicidal ideas. The patient is not nervous/anxious and is not hyperactive.         Current Outpatient Medications:     sertraline (ZOLOFT) 25 MG tablet, take 1 tablet by mouth once daily then take 0.5 tablets by mouth at bedtime, Disp: 45 tablet, Rfl: 1  Allergies   Allergen Reactions    Adhesive Tape Hives    Pravachol [Pravastatin]     Toprol Xl [Metoprolol]     Zithromax [Azithromycin]        Past Medical History:   Diagnosis Date    Anxiety     Arthritis     Cancer ([facility] [address]     Depression     Eye disorder     Hives     Hyperlipidemia     Hypertension     Measles     Melanocytoma of retina     Osteoporosis     Thyroid disease     Vitamin B12 deficiency (non anemic)     Whooping cough      Past Surgical History:   Procedure Laterality Date    CARPAL TUNNEL RELEASE      CARPAL TUNNEL RELEASE Left     HAND    DILATION AND CURETTAGE OF UTERUS      EYE SURGERY      EYE SURGERY Left     EXTRACTED LEFT EYE - CANCER    JOINT REPLACEMENT      SHOULDER ARTHROPLASTY Right 12/22/2014    REVERSE    SHOULDER ARTHROPLASTY Bilateral     THYROIDECTOMY      TUBAL LIGATION      UTERINE SUSPENSION       Family History   Problem Relation Age of Onset    High Blood Pressure Mother     Other Sister         CEREBRAL INFARCTION    High Cholesterol Sister     High Blood Pressure Sister     High Cholesterol Brother     Heart Failure Brother     Cancer Brother     Diabetes Brother     Other Brother         CHRONIC LUNG DISEASE    Heart Attack Brother     Breast Cancer Niece      Social History     Socioeconomic History    Marital status:      Spouse name: Not on file    Number of children: Not on file    Years of education: Not on file    Highest education level: Not on file   Occupational History    Not on file   Tobacco Use    Smoking status: Never    Smokeless tobacco: Never   Vaping Use    Vaping Use: Never used   Substance and Sexual Activity    Alcohol use: No    Drug use: No    Sexual activity: Not Currently     Partners: Male   Other Topics Concern    Not on file   Social History Narrative    Not on file     Social Determinants of Health     Financial Resource Strain: Low Risk     Difficulty of Paying Living Expenses: Not hard at all   Food Insecurity: No Food Insecurity    Worried About Running Out of Food in the Last Year: Never true    Mayi of Food in the Last Year: Never true   Transportation Needs: Not on file   Physical Activity: Sufficiently Active    Days of Exercise per Week: 7 days    Minutes of Exercise per Session: 30 min   Stress: Not on file   Social Connections: Not on file   Intimate Partner Violence: Not on file   Housing Stability: Not on file     Patient Active Problem List   Diagnosis    Benign paroxysmal positional vertigo    Dizziness    Vitamin B12 deficiency (non anemic)    Thyroid disease    Osteoporosis    Hypertension    Hyperlipidemia    Cancer (HCC)    Anxiety    Arthritis    Shingles rash    Malignant neoplasm of unspecified retina ([facility] [address]    Unspecified disorder of eye and adnexa    Tick bite    Sedative, hypnotic or anxiolytic use, unspecified with unspecified sedative, hypnotic or anxiolytic-induced disorder    Sedative, 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 Diagnoses and all orders for this visit:    Weakness    Age-related osteoporosis without current pathological fracture        Discussions/Education provided to patients during visit:  [] Discussed the importance to stop smoking. [] Advised to monitor eating habits. [] Reviewed and discussed Imaging results. [] Reviewed and discussed Lab results. [x] Discussed the importance of drinking plenty of fluids. [] Cut down on Salt and Caffeine.  [] Exercise 2-3 times weekly, if not more. [] Cut down on Sugar and Fats. [x] Continue Medications as Discussed. [x] Communicated with patient any concerns, to phone office. [x] Follow up as directed. Encouraged to drink fluids  Caution with getting up out of bed or chair. Return in about 1 month (around 10/22/2022).       Seen By:      TORI Adhikari - CNP

## 2022-09-30 ASSESSMENT — ENCOUNTER SYMPTOMS
WHEEZING: 0
ABDOMINAL DISTENTION: 0
CHEST TIGHTNESS: 0
DIARRHEA: 0
SINUS PAIN: 0
RECTAL PAIN: 0
VOMITING: 0
NAUSEA: 0
ABDOMINAL PAIN: 0
SORE THROAT: 0
BACK PAIN: 0
APNEA: 0
PHOTOPHOBIA: 0
VOICE CHANGE: 0
CONSTIPATION: 0
RHINORRHEA: 0
FACIAL SWELLING: 0

## 2022-10-10 ENCOUNTER — OFFICE VISIT (OUTPATIENT)
Dept: FAMILY MEDICINE CLINIC | Age: 82
End: 2022-10-10
Payer: MEDICARE

## 2022-10-10 VITALS
TEMPERATURE: 97.3 F | SYSTOLIC BLOOD PRESSURE: 122 MMHG | DIASTOLIC BLOOD PRESSURE: 78 MMHG | OXYGEN SATURATION: 96 % | HEIGHT: 60 IN | BODY MASS INDEX: 23.87 KG/M2 | HEART RATE: 77 BPM | WEIGHT: 121.6 LBS

## 2022-10-10 DIAGNOSIS — E07.9 THYROID DISEASE: ICD-10-CM

## 2022-10-10 DIAGNOSIS — C69.20 MALIGNANT NEOPLASM OF RETINA, UNSPECIFIED LATERALITY (HCC): ICD-10-CM

## 2022-10-10 DIAGNOSIS — R53.1 WEAKNESS: Primary | ICD-10-CM

## 2022-10-10 DIAGNOSIS — E78.2 MIXED HYPERLIPIDEMIA: ICD-10-CM

## 2022-10-10 DIAGNOSIS — C80.1 CANCER (HCC): ICD-10-CM

## 2022-10-10 DIAGNOSIS — F32.A DEPRESSION, UNSPECIFIED DEPRESSION TYPE: ICD-10-CM

## 2022-10-10 DIAGNOSIS — M19.90 ARTHRITIS: ICD-10-CM

## 2022-10-10 DIAGNOSIS — I10 PRIMARY HYPERTENSION: ICD-10-CM

## 2022-10-10 DIAGNOSIS — M81.0 AGE-RELATED OSTEOPOROSIS WITHOUT CURRENT PATHOLOGICAL FRACTURE: ICD-10-CM

## 2022-10-10 DIAGNOSIS — R91.8 PULMONARY NODULES: ICD-10-CM

## 2022-10-10 DIAGNOSIS — H81.10 BENIGN PAROXYSMAL POSITIONAL VERTIGO, UNSPECIFIED LATERALITY: ICD-10-CM

## 2022-10-10 PROCEDURE — 3078F DIAST BP <80 MM HG: CPT | Performed by: FAMILY MEDICINE

## 2022-10-10 PROCEDURE — G8420 CALC BMI NORM PARAMETERS: HCPCS | Performed by: FAMILY MEDICINE

## 2022-10-10 PROCEDURE — 1036F TOBACCO NON-USER: CPT | Performed by: FAMILY MEDICINE

## 2022-10-10 PROCEDURE — 3074F SYST BP LT 130 MM HG: CPT | Performed by: FAMILY MEDICINE

## 2022-10-10 PROCEDURE — G8427 DOCREV CUR MEDS BY ELIG CLIN: HCPCS | Performed by: FAMILY MEDICINE

## 2022-10-10 PROCEDURE — 1090F PRES/ABSN URINE INCON ASSESS: CPT | Performed by: FAMILY MEDICINE

## 2022-10-10 PROCEDURE — G8484 FLU IMMUNIZE NO ADMIN: HCPCS | Performed by: FAMILY MEDICINE

## 2022-10-10 PROCEDURE — 1123F ACP DISCUSS/DSCN MKR DOCD: CPT | Performed by: FAMILY MEDICINE

## 2022-10-10 PROCEDURE — 99213 OFFICE O/P EST LOW 20 MIN: CPT | Performed by: FAMILY MEDICINE

## 2022-10-10 PROCEDURE — G8400 PT W/DXA NO RESULTS DOC: HCPCS | Performed by: FAMILY MEDICINE

## 2022-10-10 RX ORDER — FLUTICASONE PROPIONATE 50 MCG
2 SPRAY, SUSPENSION (ML) NASAL DAILY
Qty: 16 G | Refills: 5 | Status: SHIPPED | OUTPATIENT
Start: 2022-10-10

## 2022-10-10 ASSESSMENT — ENCOUNTER SYMPTOMS
COUGH: 1
SHORTNESS OF BREATH: 1

## 2022-10-10 NOTE — PROGRESS NOTES
10/10/22  Bear Mcnamara : 1940 Sex: female  Age: 79 y.o. Chief Complaint   Patient presents with    Other     Check up , wart or something on her pointer finger on her left hand she would like you to look at        HPI  follow up e r overall situation     Review of Systems   Constitutional:  Negative for activity change, appetite change, chills, diaphoresis, fatigue, fever and unexpected weight change. HENT:  Positive for postnasal drip (stidrainage posterior through). Negative for congestion, dental problem, drooling, ear discharge, ear pain, facial swelling, hearing loss, mouth sores, nosebleeds, rhinorrhea, sinus pressure, sinus pain, sneezing, sore throat, tinnitus, trouble swallowing and voice change. Eyes:  Negative for photophobia, pain, discharge, redness, itching and visual disturbance. Respiratory:  Positive for cough (has waned) and shortness of breath (mild). Negative for apnea, choking, chest tightness, wheezing and stridor. Cardiovascular:  Negative for chest pain, palpitations and leg swelling. Gastrointestinal:  Negative for abdominal distention, abdominal pain, anal bleeding, blood in stool, constipation, diarrhea, nausea, rectal pain and vomiting. Endocrine: Negative for cold intolerance, heat intolerance, polydipsia, polyphagia and polyuria. Genitourinary:  Negative for decreased urine volume, difficulty urinating, dysuria, enuresis, flank pain, frequency, genital sores, hematuria and urgency. Musculoskeletal:  Negative for arthralgias, back pain, gait problem, joint swelling, myalgias, neck pain and neck stiffness. Finger wart    Skin:  Negative for color change, pallor, rash and wound. Allergic/Immunologic: Negative for environmental allergies, food allergies and immunocompromised state. Neurological:  Positive for dizziness, weakness and light-headedness.  Negative for tremors, seizures, syncope, facial asymmetry, speech difficulty, numbness and headaches. Hematological:  Negative for adenopathy. Does not bruise/bleed easily. Psychiatric/Behavioral:  Negative for agitation, behavioral problems, confusion, decreased concentration, hallucinations, self-injury, sleep disturbance and suicidal ideas. The patient is not nervous/anxious and is not hyperactive. Physical Exam  Vitals and nursing note reviewed. Constitutional:       Appearance: Normal appearance. She is ill-appearing. HENT:      Head: Normocephalic. Right Ear: Tympanic membrane is bulging. Left Ear: Tenderness present. Tympanic membrane is erythematous and bulging. Mouth/Throat:      Pharynx: Oropharynx is clear. Eyes:      Pupils: Pupils are equal, round, and reactive to light. Comments: Artificial eye    Cardiovascular:      Rate and Rhythm: Normal rate and regular rhythm. Pulses: Normal pulses. Heart sounds: Normal heart sounds. Pulmonary:      Effort: Pulmonary effort is normal.      Breath sounds: Normal breath sounds. No wheezing, rhonchi or rales. Abdominal:      Palpations: Abdomen is soft. Tenderness: There is no abdominal tenderness. Comments: Tenderness on palpation swelling r wrist    Musculoskeletal:      Right wrist: Swelling and tenderness present. Right hand: Tenderness present. Comments: Left ankle and knee   Finger wart    Skin:     General: Skin is warm and dry. Capillary Refill: Capillary refill takes less than 2 seconds. Coloration: Skin is not pale. Neurological:      Mental Status: She is alert and oriented to person, place, and time. Motor: Weakness present. Coordination: Coordination normal.   Psychiatric:         Mood and Affect: Mood normal.         Behavior: Behavior normal.       Assessment and Plan:  La Gregory was seen today for other.     Diagnoses and all orders for this visit:    Weakness    Mixed hyperlipidemia    Malignant neoplasm of retina, unspecified laterality ([facility]    Cancer ([facility]    Pulmonary nodules    Primary hypertension    Age-related osteoporosis without current pathological fracture    Thyroid disease    Benign paroxysmal positional vertigo, unspecified laterality    Arthritis    Depression, unspecified depression type    Other orders  -     fluticasone (FLONASE) 50 MCG/ACT nasal spray; 2 sprays by Each Nostril route daily        Discussions/Education provided to patients during visit:  [] Discussed the importance to stop smoking. [] Advised to monitor eating habits. [] Reviewed and discussed Imaging results. [x] Reviewed and discussed Lab results. [x] Discussed the importance of drinking plenty of fluids. [x] Cut down on Salt, Caffeine, and Sugar.  [] Non Compliant Patient   [x] Communicated with patient any concerns, to phone office. Informed on finger to derm or specialist use otc   Discussed situation at home       No follow-ups on file.       Seen by:     Tamia Carlson DO

## 2022-11-10 ASSESSMENT — ENCOUNTER SYMPTOMS
EYE PAIN: 0
TROUBLE SWALLOWING: 0
SORE THROAT: 0
ANAL BLEEDING: 0
RHINORRHEA: 0
EYE REDNESS: 0
WHEEZING: 0
ABDOMINAL PAIN: 0
EYE ITCHING: 0
DIARRHEA: 0
NAUSEA: 0
VOMITING: 0
STRIDOR: 0
CHOKING: 0
PHOTOPHOBIA: 0
CHEST TIGHTNESS: 0
SINUS PAIN: 0
RECTAL PAIN: 0
EYE DISCHARGE: 0
VOICE CHANGE: 0
ABDOMINAL DISTENTION: 0
CONSTIPATION: 0
BACK PAIN: 0
APNEA: 0
SINUS PRESSURE: 0
COLOR CHANGE: 0
FACIAL SWELLING: 0
BLOOD IN STOOL: 0

## 2022-11-14 ENCOUNTER — OFFICE VISIT (OUTPATIENT)
Dept: FAMILY MEDICINE CLINIC | Age: 82
End: 2022-11-14
Payer: MEDICARE

## 2022-11-14 VITALS
OXYGEN SATURATION: 96 % | RESPIRATION RATE: 17 BRPM | BODY MASS INDEX: 23.6 KG/M2 | DIASTOLIC BLOOD PRESSURE: 70 MMHG | SYSTOLIC BLOOD PRESSURE: 110 MMHG | WEIGHT: 120.2 LBS | TEMPERATURE: 98.6 F | HEART RATE: 64 BPM | HEIGHT: 60 IN

## 2022-11-14 DIAGNOSIS — C69.20 MALIGNANT NEOPLASM OF RETINA, UNSPECIFIED LATERALITY (HCC): ICD-10-CM

## 2022-11-14 DIAGNOSIS — F41.9 ANXIETY: ICD-10-CM

## 2022-11-14 DIAGNOSIS — F32.A DEPRESSION, UNSPECIFIED DEPRESSION TYPE: ICD-10-CM

## 2022-11-14 DIAGNOSIS — H81.10 BENIGN PAROXYSMAL POSITIONAL VERTIGO, UNSPECIFIED LATERALITY: Primary | ICD-10-CM

## 2022-11-14 DIAGNOSIS — C80.1 CANCER (HCC): ICD-10-CM

## 2022-11-14 DIAGNOSIS — H57.9 UNSPECIFIED DISORDER OF EYE AND ADNEXA: ICD-10-CM

## 2022-11-14 DIAGNOSIS — I10 ESSENTIAL HYPERTENSION: ICD-10-CM

## 2022-11-14 PROCEDURE — 3074F SYST BP LT 130 MM HG: CPT | Performed by: FAMILY MEDICINE

## 2022-11-14 PROCEDURE — 99213 OFFICE O/P EST LOW 20 MIN: CPT | Performed by: FAMILY MEDICINE

## 2022-11-14 PROCEDURE — G8400 PT W/DXA NO RESULTS DOC: HCPCS | Performed by: FAMILY MEDICINE

## 2022-11-14 PROCEDURE — 1123F ACP DISCUSS/DSCN MKR DOCD: CPT | Performed by: FAMILY MEDICINE

## 2022-11-14 PROCEDURE — 3078F DIAST BP <80 MM HG: CPT | Performed by: FAMILY MEDICINE

## 2022-11-14 PROCEDURE — 1036F TOBACCO NON-USER: CPT | Performed by: FAMILY MEDICINE

## 2022-11-14 PROCEDURE — G8420 CALC BMI NORM PARAMETERS: HCPCS | Performed by: FAMILY MEDICINE

## 2022-11-14 PROCEDURE — 1090F PRES/ABSN URINE INCON ASSESS: CPT | Performed by: FAMILY MEDICINE

## 2022-11-14 PROCEDURE — G8484 FLU IMMUNIZE NO ADMIN: HCPCS | Performed by: FAMILY MEDICINE

## 2022-11-14 PROCEDURE — G8427 DOCREV CUR MEDS BY ELIG CLIN: HCPCS | Performed by: FAMILY MEDICINE

## 2022-11-14 RX ORDER — LORAZEPAM 0.5 MG/1
0.5 TABLET ORAL 2 TIMES DAILY
Qty: 60 TABLET | Refills: 2 | Status: SHIPPED | OUTPATIENT
Start: 2022-11-14 | End: 2022-12-14

## 2022-11-14 NOTE — PROGRESS NOTES
22  Kristina Luu : 1940 Sex: female  Age: 79 y.o. Chief Complaint   Patient presents with    Follow-up     1 month follow up       HPI   check up   Depression   Problems with      Review of Systems   Constitutional:  Negative for activity change, appetite change, chills, diaphoresis, fatigue, fever and unexpected weight change. HENT:  Positive for postnasal drip (stidrainage posterior through). Negative for congestion, dental problem, drooling, ear discharge, ear pain, facial swelling, hearing loss, mouth sores, nosebleeds, rhinorrhea, sinus pressure, sinus pain, sneezing, sore throat, tinnitus, trouble swallowing and voice change. Eyes:  Negative for photophobia, pain, discharge, redness, itching and visual disturbance. Respiratory:  Positive for cough (has waned) and shortness of breath (mild). Negative for apnea, choking, chest tightness, wheezing and stridor. Cardiovascular:  Negative for chest pain, palpitations and leg swelling. Gastrointestinal:  Negative for abdominal distention, abdominal pain, anal bleeding, blood in stool, constipation, diarrhea, nausea, rectal pain and vomiting. Endocrine: Negative for cold intolerance, heat intolerance, polydipsia, polyphagia and polyuria. Genitourinary:  Negative for decreased urine volume, difficulty urinating, dysuria, enuresis, flank pain, frequency, genital sores, hematuria and urgency. Musculoskeletal:  Negative for arthralgias, back pain, gait problem, joint swelling, myalgias, neck pain and neck stiffness. Finger wart    Skin:  Negative for color change, pallor, rash and wound. Allergic/Immunologic: Negative for environmental allergies, food allergies and immunocompromised state. Neurological:  Positive for dizziness, weakness and light-headedness. Negative for tremors, seizures, syncope, facial asymmetry, speech difficulty, numbness and headaches. Hematological:  Negative for adenopathy.  Does not bruise/bleed easily. Psychiatric/Behavioral:  Negative for agitation, behavioral problems, confusion, decreased concentration, hallucinations, self-injury, sleep disturbance and suicidal ideas. The patient is not nervous/anxious and is not hyperactive. Physical Exam  Vitals and nursing note reviewed. Constitutional:       Appearance: Normal appearance. She is ill-appearing. HENT:      Head: Normocephalic. Right Ear: Tympanic membrane is bulging. Left Ear: Tenderness present. Tympanic membrane is erythematous and bulging. Mouth/Throat:      Pharynx: Oropharynx is clear. Eyes:      Pupils: Pupils are equal, round, and reactive to light. Comments: Artificial eye    Cardiovascular:      Rate and Rhythm: Normal rate and regular rhythm. Pulses: Normal pulses. Heart sounds: Normal heart sounds. Pulmonary:      Effort: Pulmonary effort is normal.      Breath sounds: Normal breath sounds. No wheezing, rhonchi or rales. Abdominal:      Palpations: Abdomen is soft. Tenderness: There is no abdominal tenderness. Comments: Tenderness on palpation swelling r wrist    Musculoskeletal:      Right wrist: Swelling and tenderness present. Right hand: Tenderness present. Comments: Left ankle and knee   Finger wart    Skin:     General: Skin is warm and dry. Capillary Refill: Capillary refill takes less than 2 seconds. Coloration: Skin is not pale. Neurological:      Mental Status: She is alert and oriented to person, place, and time. Motor: Weakness present. Coordination: Coordination normal.   Psychiatric:         Mood and Affect: Mood normal.         Behavior: Behavior normal.      Comments: Obsessive-compulsive and depressed along with anxiety she is not suicidal at this time       Assessment and Plan:  Avani Vang was seen today for follow-up.     Diagnoses and all orders for this visit:    Benign paroxysmal positional vertigo, unspecified laterality    Anxiety  -     LORazepam (ATIVAN) 0.5 MG tablet; Take 1 tablet by mouth 2 times daily for 30 days. TAKE 1 TABLET BY MOUTH TWICE A DAY    Malignant neoplasm of retina, unspecified laterality ([address]    Unspecified disorder of eye and adnexa    Essential hypertension    Cancer (HCC)    Depression, unspecified depression type        Discussions/Education provided to patients during visit:  [] Discussed the importance to stop smoking. [] Advised to monitor eating habits. [] Reviewed and discussed Imaging results. [] Reviewed and discussed Lab results. [] Discussed the importance of drinking plenty of fluids. [] Cut down on Salt, Caffeine, and Sugar.  [] Non Compliant Patient   [x] Communicated with patient any concerns, to phone office. Stress severe and multiple problems . This is a very obsessive-compulsive lady who has a very ill .  has to be cath every 8 hours as a source of extreme stress for her otherwise the  is totally nonambulatory and that is a major problem also because she is picking him up off the floor and doing various things with him clued lifting and trying to haul him around the house. She also has a lot of cats and she is feeding the cats and taking care of litter etc..  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 You can get her to stop she is obsessive-compulsive she is also extremely depressed extremely anxious we tried various medications etc. and they have not been successful because she will not take that      Time spent face to face with patient discussing medications and labs: 30  minutes. No follow-ups on file.       Seen by:     Breann Kee,

## 2022-12-05 ASSESSMENT — ENCOUNTER SYMPTOMS
VOICE CHANGE: 0
RHINORRHEA: 0
ABDOMINAL DISTENTION: 0
SINUS PRESSURE: 0
WHEEZING: 0
FACIAL SWELLING: 0
CHOKING: 0
ABDOMINAL PAIN: 0
CONSTIPATION: 0
EYE REDNESS: 0
COUGH: 1
SINUS PAIN: 0
DIARRHEA: 0
BLOOD IN STOOL: 0
SORE THROAT: 0
RECTAL PAIN: 0
STRIDOR: 0
ANAL BLEEDING: 0
NAUSEA: 0
BACK PAIN: 0
EYE DISCHARGE: 0
EYE ITCHING: 0
TROUBLE SWALLOWING: 0
VOMITING: 0
SHORTNESS OF BREATH: 1
COLOR CHANGE: 0
CHEST TIGHTNESS: 0
APNEA: 0
PHOTOPHOBIA: 0
EYE PAIN: 0

## 2022-12-19 ENCOUNTER — OFFICE VISIT (OUTPATIENT)
Dept: FAMILY MEDICINE CLINIC | Age: 82
End: 2022-12-19

## 2022-12-19 VITALS
OXYGEN SATURATION: 97 % | DIASTOLIC BLOOD PRESSURE: 80 MMHG | TEMPERATURE: 98.4 F | RESPIRATION RATE: 17 BRPM | BODY MASS INDEX: 23.09 KG/M2 | SYSTOLIC BLOOD PRESSURE: 122 MMHG | WEIGHT: 117.6 LBS | HEIGHT: 60 IN | HEART RATE: 67 BPM

## 2022-12-19 DIAGNOSIS — I10 ESSENTIAL HYPERTENSION: ICD-10-CM

## 2022-12-19 DIAGNOSIS — E78.2 MIXED HYPERLIPIDEMIA: ICD-10-CM

## 2022-12-19 DIAGNOSIS — E07.9 THYROID DISEASE: ICD-10-CM

## 2022-12-19 DIAGNOSIS — H65.92 LEFT NON-SUPPURATIVE OTITIS MEDIA: ICD-10-CM

## 2022-12-19 DIAGNOSIS — C80.1 CANCER (HCC): ICD-10-CM

## 2022-12-19 DIAGNOSIS — Z91.81 AT HIGH RISK FOR FALLS: ICD-10-CM

## 2022-12-19 DIAGNOSIS — M81.0 AGE-RELATED OSTEOPOROSIS WITHOUT CURRENT PATHOLOGICAL FRACTURE: ICD-10-CM

## 2022-12-19 DIAGNOSIS — H57.9 UNSPECIFIED DISORDER OF EYE AND ADNEXA: ICD-10-CM

## 2022-12-19 DIAGNOSIS — F13.20 SEDATIVE, HYPNOTIC OR ANXIOLYTIC DEPENDENCE, UNCOMPLICATED (HCC): ICD-10-CM

## 2022-12-19 DIAGNOSIS — I10 PRIMARY HYPERTENSION: ICD-10-CM

## 2022-12-19 DIAGNOSIS — H81.10 BENIGN PAROXYSMAL POSITIONAL VERTIGO, UNSPECIFIED LATERALITY: Primary | ICD-10-CM

## 2022-12-19 DIAGNOSIS — E53.8 VITAMIN B12 DEFICIENCY (NON ANEMIC): ICD-10-CM

## 2022-12-19 DIAGNOSIS — S60.211A CONTUSION OF RIGHT WRIST, INITIAL ENCOUNTER: ICD-10-CM

## 2022-12-19 DIAGNOSIS — R53.1 WEAKNESS: ICD-10-CM

## 2022-12-19 DIAGNOSIS — R07.9 CHEST PAIN, UNSPECIFIED TYPE: ICD-10-CM

## 2022-12-19 DIAGNOSIS — F32.A DEPRESSION, UNSPECIFIED DEPRESSION TYPE: ICD-10-CM

## 2022-12-19 DIAGNOSIS — B02.30 HERPES ZOSTER WITH OPHTHALMIC COMPLICATION, UNSPECIFIED HERPES ZOSTER EYE DISEASE: ICD-10-CM

## 2022-12-19 DIAGNOSIS — R42 DIZZINESS: ICD-10-CM

## 2022-12-19 DIAGNOSIS — C69.20 MALIGNANT NEOPLASM OF RETINA, UNSPECIFIED LATERALITY (HCC): ICD-10-CM

## 2022-12-19 ASSESSMENT — ENCOUNTER SYMPTOMS
SHORTNESS OF BREATH: 1
VOICE CHANGE: 0
DIARRHEA: 0
EYE DISCHARGE: 0
APNEA: 0
SINUS PRESSURE: 0
SINUS PAIN: 0
CONSTIPATION: 0
BLOOD IN STOOL: 0
CHOKING: 0
RHINORRHEA: 0
ABDOMINAL DISTENTION: 0
SORE THROAT: 0
COUGH: 1
STRIDOR: 0
TROUBLE SWALLOWING: 0
NAUSEA: 0
EYE PAIN: 0
ANAL BLEEDING: 0
BACK PAIN: 0
CHEST TIGHTNESS: 0
COLOR CHANGE: 0
EYE REDNESS: 0
WHEEZING: 0
EYE ITCHING: 0
RECTAL PAIN: 0
PHOTOPHOBIA: 0
VOMITING: 0
ABDOMINAL PAIN: 0
FACIAL SWELLING: 0

## 2022-12-19 NOTE — PROGRESS NOTES
22  Christie Lancaster : 1940 Sex: female  Age: 79 y.o. Chief Complaint   Patient presents with    Depression     1 month follow up       HPI   overall situation   Emotional  problems     Review of Systems   Constitutional:  Negative for activity change, appetite change, chills, diaphoresis, fatigue, fever and unexpected weight change. Overall back to cathing   tires her out    HENT:  Positive for postnasal drip (stidrainage posterior through). Negative for congestion, dental problem, drooling, ear discharge, ear pain, facial swelling, hearing loss, mouth sores, nosebleeds, rhinorrhea, sinus pressure, sinus pain, sneezing, sore throat, tinnitus, trouble swallowing and voice change. Eyes:  Negative for photophobia, pain, discharge, redness, itching and visual disturbance. Respiratory:  Positive for cough (has waned) and shortness of breath (mild). Negative for apnea, choking, chest tightness, wheezing and stridor. Cardiovascular:  Negative for chest pain, palpitations and leg swelling. Gastrointestinal:  Negative for abdominal distention, abdominal pain, anal bleeding, blood in stool, constipation, diarrhea, nausea, rectal pain and vomiting. Endocrine: Negative for cold intolerance, heat intolerance, polydipsia, polyphagia and polyuria. Genitourinary:  Negative for decreased urine volume, difficulty urinating, dysuria, enuresis, flank pain, frequency, genital sores, hematuria and urgency. Musculoskeletal:  Negative for arthralgias, back pain, gait problem, joint swelling, myalgias, neck pain and neck stiffness. Finger wart    Skin:  Negative for color change, pallor, rash and wound. Allergic/Immunologic: Negative for environmental allergies, food allergies and immunocompromised state. Neurological:  Positive for dizziness, weakness and light-headedness. Negative for tremors, seizures, syncope, facial asymmetry, speech difficulty, numbness and headaches. Hematological:  Negative for adenopathy. Does not bruise/bleed easily. Psychiatric/Behavioral:  Negative for agitation, behavioral problems, confusion, decreased concentration, hallucinations, self-injury, sleep disturbance and suicidal ideas. The patient is not nervous/anxious and is not hyperactive. Physical Exam  Vitals and nursing note reviewed. Constitutional:       Appearance: Normal appearance. She is ill-appearing. HENT:      Head: Normocephalic. Right Ear: Tympanic membrane is bulging. Left Ear: Tenderness present. Tympanic membrane is erythematous and bulging. Mouth/Throat:      Pharynx: Oropharynx is clear. Eyes:      Pupils: Pupils are equal, round, and reactive to light. Comments: Artificial eye    Cardiovascular:      Rate and Rhythm: Normal rate and regular rhythm. Pulses: Normal pulses. Heart sounds: Normal heart sounds. Pulmonary:      Effort: Pulmonary effort is normal.      Breath sounds: Normal breath sounds. No wheezing, rhonchi or rales. Abdominal:      Palpations: Abdomen is soft. Tenderness: There is no abdominal tenderness. Comments: Tenderness on palpation swelling r wrist    Musculoskeletal:      Right wrist: Swelling and tenderness present. Right hand: Tenderness present. Comments: Left ankle and knee   Finger wart    Skin:     General: Skin is warm and dry. Capillary Refill: Capillary refill takes less than 2 seconds. Coloration: Skin is not pale. Neurological:      Mental Status: She is alert and oriented to person, place, and time. Motor: Weakness present. Coordination: Coordination normal.   Psychiatric:         Mood and Affect: Mood normal.         Behavior: Behavior normal.      Comments: Obsessive-compulsive and depressed along with anxiety she is not suicidal at this time       Assessment and Plan:  Anna Suarez was seen today for depression.     Diagnoses and all orders for this visit:    Benign paroxysmal positional vertigo, unspecified laterality    Cancer (HCC)    Primary hypertension    Vitamin B12 deficiency (non anemic)    Depression, unspecified depression type    Age-related osteoporosis without current pathological fracture    Dizziness    Left non-suppurative otitis media    Sedative, hypnotic or anxiolytic dependence, uncomplicated    Unspecified disorder of eye and adnexa    Weakness    Malignant neoplasm of retina, unspecified laterality (HCC)    Thyroid disease    Mixed hyperlipidemia    Herpes zoster with ophthalmic complication, unspecified herpes zoster eye disease    Essential hypertension    Contusion of right wrist, initial encounter    At high risk for falls    Chest pain, unspecified type        Discussions/Education provided to patients during visit:  [] Discussed the importance to stop smoking. [] Advised to monitor eating habits. [] Reviewed and discussed Imaging results. [] Reviewed and discussed Lab results. [x] Discussed the importance of drinking plenty of fluids. [] Cut down on Salt, Caffeine, and Sugar.  [] Non Compliant Patient   [x] Communicated with patient any concerns, to phone office. Takes 1/2 pill of  zolooft one half bid   Recheck 1 month      No follow-ups on file.       Seen by:     Kassadnra Dhillon DO

## 2023-01-04 ENCOUNTER — TELEPHONE (OUTPATIENT)
Dept: FAMILY MEDICINE CLINIC | Age: 83
End: 2023-01-04

## 2023-01-04 NOTE — TELEPHONE ENCOUNTER
----- Message from Shazia Menchaca sent at 1/4/2023 12:19 PM EST -----  Subject: Message to Provider    QUESTIONS  Information for Provider? Pt of Dr. Giselle Harding called to inquire if she has   received flu shot this season. Stated she received phone call reminder   from [facility] to schedule flu shot, however she is unable to recall if it was   received at past appts. ---------------------------------------------------------------------------  --------------  Praveen RICARDO  [phone]; OK to leave message on voicemail  ---------------------------------------------------------------------------  --------------  SCRIPT ANSWERS  Relationship to Patient?  Self

## 2023-01-17 RX ORDER — SERTRALINE HYDROCHLORIDE 25 MG/1
TABLET, FILM COATED ORAL
Qty: 90 TABLET | Refills: 1 | Status: SHIPPED | OUTPATIENT
Start: 2023-01-17

## 2023-01-17 NOTE — TELEPHONE ENCOUNTER
Refill  Also states she was told to take 1 pill 1 time a day and 1/2 a pill at night, now has 4 1/2 pills left. So she needs a refill but she doesn't have enough left for rest of month

## 2023-01-23 ENCOUNTER — OFFICE VISIT (OUTPATIENT)
Dept: FAMILY MEDICINE CLINIC | Age: 83
End: 2023-01-23

## 2023-01-23 VITALS
OXYGEN SATURATION: 95 % | WEIGHT: 118.5 LBS | TEMPERATURE: 98.6 F | RESPIRATION RATE: 17 BRPM | BODY MASS INDEX: 23.26 KG/M2 | HEART RATE: 73 BPM | DIASTOLIC BLOOD PRESSURE: 78 MMHG | HEIGHT: 60 IN | SYSTOLIC BLOOD PRESSURE: 132 MMHG

## 2023-01-23 DIAGNOSIS — R42 DIZZINESS: ICD-10-CM

## 2023-01-23 DIAGNOSIS — E07.9 THYROID DISEASE: ICD-10-CM

## 2023-01-23 DIAGNOSIS — I10 PRIMARY HYPERTENSION: Primary | ICD-10-CM

## 2023-01-23 DIAGNOSIS — M81.0 AGE-RELATED OSTEOPOROSIS WITHOUT CURRENT PATHOLOGICAL FRACTURE: ICD-10-CM

## 2023-01-23 DIAGNOSIS — F32.A DEPRESSION, UNSPECIFIED DEPRESSION TYPE: ICD-10-CM

## 2023-01-23 DIAGNOSIS — I10 ESSENTIAL HYPERTENSION: ICD-10-CM

## 2023-01-23 DIAGNOSIS — E78.2 MIXED HYPERLIPIDEMIA: ICD-10-CM

## 2023-01-23 DIAGNOSIS — C69.20 MALIGNANT NEOPLASM OF RETINA, UNSPECIFIED LATERALITY (HCC): ICD-10-CM

## 2023-01-23 DIAGNOSIS — F41.9 ANXIETY: ICD-10-CM

## 2023-01-23 DIAGNOSIS — E53.8 VITAMIN B12 DEFICIENCY (NON ANEMIC): ICD-10-CM

## 2023-01-23 DIAGNOSIS — C80.1 CANCER (HCC): ICD-10-CM

## 2023-01-23 RX ORDER — LORAZEPAM 0.5 MG/1
TABLET ORAL
COMMUNITY
Start: 2023-01-12 | End: 2023-02-14

## 2023-01-23 ASSESSMENT — PATIENT HEALTH QUESTIONNAIRE - PHQ9
5. POOR APPETITE OR OVEREATING: 3
SUM OF ALL RESPONSES TO PHQ9 QUESTIONS 1 & 2: 3
SUM OF ALL RESPONSES TO PHQ QUESTIONS 1-9: 15
8. MOVING OR SPEAKING SO SLOWLY THAT OTHER PEOPLE COULD HAVE NOTICED. OR THE OPPOSITE, BEING SO FIGETY OR RESTLESS THAT YOU HAVE BEEN MOVING AROUND A LOT MORE THAN USUAL: 1
4. FEELING TIRED OR HAVING LITTLE ENERGY: 0
2. FEELING DOWN, DEPRESSED OR HOPELESS: 3
6. FEELING BAD ABOUT YOURSELF - OR THAT YOU ARE A FAILURE OR HAVE LET YOURSELF OR YOUR FAMILY DOWN: 2
SUM OF ALL RESPONSES TO PHQ QUESTIONS 1-9: 15
10. IF YOU CHECKED OFF ANY PROBLEMS, HOW DIFFICULT HAVE THESE PROBLEMS MADE IT FOR YOU TO DO YOUR WORK, TAKE CARE OF THINGS AT HOME, OR GET ALONG WITH OTHER PEOPLE: 0
9. THOUGHTS THAT YOU WOULD BE BETTER OFF DEAD, OR OF HURTING YOURSELF: 0
3. TROUBLE FALLING OR STAYING ASLEEP: 3
SUM OF ALL RESPONSES TO PHQ QUESTIONS 1-9: 15
1. LITTLE INTEREST OR PLEASURE IN DOING THINGS: 0
7. TROUBLE CONCENTRATING ON THINGS, SUCH AS READING THE NEWSPAPER OR WATCHING TELEVISION: 3
SUM OF ALL RESPONSES TO PHQ QUESTIONS 1-9: 15

## 2023-01-23 NOTE — PROGRESS NOTES
23  Alan Bolden : 1940 Sex: female  Age: 79 y.o. Chief Complaint   Patient presents with    Follow-up     1 month follow up       HPI   follow up   Still struggle with    Still stuggling with grief     Review of Systems   Constitutional:  Negative for activity change, appetite change, chills, diaphoresis, fatigue, fever and unexpected weight change. Overall back to cathing   tires her out    HENT:  Positive for postnasal drip (stidrainage posterior through). Negative for congestion, dental problem, drooling, ear discharge, ear pain, facial swelling, hearing loss, mouth sores, nosebleeds, rhinorrhea, sinus pressure, sinus pain, sneezing, sore throat, tinnitus, trouble swallowing and voice change. Eyes:  Negative for photophobia, pain, discharge, redness, itching and visual disturbance. Respiratory:  Positive for cough (has waned) and shortness of breath (mild). Negative for apnea, choking, chest tightness, wheezing and stridor. Cardiovascular:  Positive for chest pain (left ribs pain persistsa dnis present left ribsd=s). Negative for palpitations and leg swelling. Gastrointestinal:  Negative for abdominal distention, abdominal pain, anal bleeding, blood in stool, constipation, diarrhea, nausea, rectal pain and vomiting. Endocrine: Negative for cold intolerance, heat intolerance, polydipsia, polyphagia and polyuria. Genitourinary:  Negative for decreased urine volume, difficulty urinating, dysuria, enuresis, flank pain, frequency, genital sores, hematuria and urgency. Musculoskeletal:  Positive for arthralgias (left chest ribs). Negative for back pain, gait problem, joint swelling, myalgias, neck pain and neck stiffness. Finger wart    Skin:  Negative for color change, pallor, rash and wound. Allergic/Immunologic: Negative for environmental allergies, food allergies and immunocompromised state.    Neurological:  Positive for dizziness, weakness and light-headedness. Negative for tremors, seizures, syncope, facial asymmetry, speech difficulty, numbness and headaches. Hematological:  Negative for adenopathy. Does not bruise/bleed easily. Psychiatric/Behavioral:  Negative for agitation, behavioral problems, confusion, decreased concentration, hallucinations, self-injury, sleep disturbance and suicidal ideas. The patient is not nervous/anxious and is not hyperactive. Emotionally distraught  feeds cats deer cathed         Physical Exam  Vitals and nursing note reviewed. Constitutional:       Appearance: Normal appearance. She is ill-appearing. HENT:      Head: Normocephalic. Right Ear: Tympanic membrane is bulging. Left Ear: Tenderness present. Tympanic membrane is erythematous and bulging. Mouth/Throat:      Pharynx: Oropharynx is clear. Eyes:      Pupils: Pupils are equal, round, and reactive to light. Comments: Artificial eye    Cardiovascular:      Rate and Rhythm: Normal rate and regular rhythm. Pulses: Normal pulses. Heart sounds: Normal heart sounds. Pulmonary:      Effort: Pulmonary effort is normal.      Breath sounds: Normal breath sounds. No wheezing, rhonchi or rales. Abdominal:      Palpations: Abdomen is soft. Tenderness: There is no abdominal tenderness. Comments: Tenderness on palpation swelling r wrist    Musculoskeletal:      Right wrist: Swelling and tenderness present. Right hand: Tenderness present. Comments: Left ankle and knee   Finger wart    Skin:     General: Skin is warm and dry. Capillary Refill: Capillary refill takes less than 2 seconds. Coloration: Skin is not pale. Neurological:      Mental Status: She is alert and oriented to person, place, and time. Motor: Weakness present.       Coordination: Coordination normal.   Psychiatric:         Mood and Affect: Mood normal.         Behavior: Behavior normal.      Comments: Obsessive-compulsive and depressed along with anxiety she is not suicidal at this time       Assessment and Plan:  Anayeli Mccullough was seen today for follow-up. Diagnoses and all orders for this visit:    Primary hypertension    Cancer ([address]  Comments:  melanoma of eye     Essential hypertension    Age-related osteoporosis without current pathological fracture    Dizziness    Malignant neoplasm of retina, unspecified laterality (HCC)    Thyroid disease    Vitamin B12 deficiency (non anemic)    Mixed hyperlipidemia    Depression, unspecified depression type  Comments: Only takes limited medications    Anxiety        Discussions/Education provided to patients during visit:  [] Discussed the importance to stop smoking. [] Advised to monitor eating habits. [] Reviewed and discussed Imaging results. [] Reviewed and discussed Lab results. [] Discussed the importance of drinking plenty of fluids. [] Cut down on Salt, Caffeine, and Sugar.  [] Non Compliant Patient   [x] Communicated with patient any concerns, to phone office. Take the seratiline to deal with grief from niece and neighbor   Discussion with patient  of overall condition   Recheck 1 month        Return in about 1 month (around 2/23/2023).       Seen by:     Javon Minaya, DO

## 2023-02-03 ENCOUNTER — OFFICE VISIT (OUTPATIENT)
Dept: FAMILY MEDICINE CLINIC | Age: 83
End: 2023-02-03
Payer: MEDICARE

## 2023-02-03 VITALS
HEIGHT: 60 IN | SYSTOLIC BLOOD PRESSURE: 102 MMHG | RESPIRATION RATE: 18 BRPM | WEIGHT: 120 LBS | DIASTOLIC BLOOD PRESSURE: 76 MMHG | HEART RATE: 62 BPM | OXYGEN SATURATION: 97 % | TEMPERATURE: 97.1 F | BODY MASS INDEX: 23.56 KG/M2

## 2023-02-03 DIAGNOSIS — E53.8 VITAMIN B12 DEFICIENCY (NON ANEMIC): ICD-10-CM

## 2023-02-03 DIAGNOSIS — H57.9 UNSPECIFIED DISORDER OF EYE AND ADNEXA: ICD-10-CM

## 2023-02-03 DIAGNOSIS — E07.9 THYROID DISEASE: ICD-10-CM

## 2023-02-03 DIAGNOSIS — F41.9 ANXIETY: ICD-10-CM

## 2023-02-03 DIAGNOSIS — R07.9 CHEST PAIN, UNSPECIFIED TYPE: ICD-10-CM

## 2023-02-03 DIAGNOSIS — S60.211A CONTUSION OF RIGHT WRIST, INITIAL ENCOUNTER: ICD-10-CM

## 2023-02-03 DIAGNOSIS — F32.A DEPRESSION, UNSPECIFIED DEPRESSION TYPE: ICD-10-CM

## 2023-02-03 DIAGNOSIS — H81.10 BENIGN PAROXYSMAL POSITIONAL VERTIGO, UNSPECIFIED LATERALITY: ICD-10-CM

## 2023-02-03 DIAGNOSIS — R53.1 WEAKNESS: ICD-10-CM

## 2023-02-03 DIAGNOSIS — C80.1 CANCER (HCC): ICD-10-CM

## 2023-02-03 DIAGNOSIS — F13.20 SEDATIVE, HYPNOTIC OR ANXIOLYTIC DEPENDENCE, UNCOMPLICATED (HCC): ICD-10-CM

## 2023-02-03 DIAGNOSIS — I10 ESSENTIAL HYPERTENSION: ICD-10-CM

## 2023-02-03 DIAGNOSIS — C69.20 MALIGNANT NEOPLASM OF RETINA, UNSPECIFIED LATERALITY (HCC): ICD-10-CM

## 2023-02-03 DIAGNOSIS — F43.21 GRIEF REACTION: Primary | ICD-10-CM

## 2023-02-03 DIAGNOSIS — E78.2 MIXED HYPERLIPIDEMIA: ICD-10-CM

## 2023-02-03 PROCEDURE — G8482 FLU IMMUNIZE ORDER/ADMIN: HCPCS | Performed by: FAMILY MEDICINE

## 2023-02-03 PROCEDURE — 1090F PRES/ABSN URINE INCON ASSESS: CPT | Performed by: FAMILY MEDICINE

## 2023-02-03 PROCEDURE — 1123F ACP DISCUSS/DSCN MKR DOCD: CPT | Performed by: FAMILY MEDICINE

## 2023-02-03 PROCEDURE — 1036F TOBACCO NON-USER: CPT | Performed by: FAMILY MEDICINE

## 2023-02-03 PROCEDURE — G8420 CALC BMI NORM PARAMETERS: HCPCS | Performed by: FAMILY MEDICINE

## 2023-02-03 PROCEDURE — 99213 OFFICE O/P EST LOW 20 MIN: CPT | Performed by: FAMILY MEDICINE

## 2023-02-03 PROCEDURE — 3074F SYST BP LT 130 MM HG: CPT | Performed by: FAMILY MEDICINE

## 2023-02-03 PROCEDURE — G8427 DOCREV CUR MEDS BY ELIG CLIN: HCPCS | Performed by: FAMILY MEDICINE

## 2023-02-03 PROCEDURE — G8400 PT W/DXA NO RESULTS DOC: HCPCS | Performed by: FAMILY MEDICINE

## 2023-02-03 PROCEDURE — 3078F DIAST BP <80 MM HG: CPT | Performed by: FAMILY MEDICINE

## 2023-02-03 ASSESSMENT — ENCOUNTER SYMPTOMS: CHEST TIGHTNESS: 1

## 2023-02-03 NOTE — PROGRESS NOTES
2/3/23  Glady Gowers : 1940 Sex: female  Age: 79 y.o. Chief Complaint   Patient presents with    Anxiety       HPI  overall situation   Distraught over  death          Review of Systems   Constitutional:  Negative for activity change, appetite change, chills, diaphoresis, fatigue, fever and unexpected weight change. Overall back to cathing   tires her out    HENT:  Positive for postnasal drip (stidrainage posterior through). Negative for congestion, dental problem, drooling, ear discharge, ear pain, facial swelling, hearing loss, mouth sores, nosebleeds, rhinorrhea, sinus pressure, sinus pain, sneezing, sore throat, tinnitus, trouble swallowing and voice change. Eyes:  Negative for photophobia, pain, discharge, redness, itching and visual disturbance. Respiratory:  Positive for cough (has waned), chest tightness (tenderness from muscles) and shortness of breath (mild). Negative for apnea, choking, wheezing and stridor. Cardiovascular:  Negative for chest pain, palpitations and leg swelling. Gastrointestinal:  Negative for abdominal distention, abdominal pain, anal bleeding, blood in stool, constipation, diarrhea, nausea, rectal pain and vomiting. Endocrine: Negative for cold intolerance, heat intolerance, polydipsia, polyphagia and polyuria. Genitourinary:  Negative for decreased urine volume, difficulty urinating, dysuria, enuresis, flank pain, frequency, genital sores, hematuria and urgency. Musculoskeletal:  Positive for arthralgias (r hip). Negative for back pain, gait problem, joint swelling, myalgias, neck pain and neck stiffness. Finger wart    Skin:  Negative for color change, pallor, rash and wound. Allergic/Immunologic: Negative for environmental allergies, food allergies and immunocompromised state. Neurological:  Positive for dizziness, weakness and light-headedness.  Negative for tremors, seizures, syncope, facial asymmetry, speech difficulty, numbness and headaches. Hematological:  Negative for adenopathy. Does not bruise/bleed easily. Psychiatric/Behavioral:  Negative for agitation, behavioral problems, confusion, decreased concentration, hallucinations, self-injury, sleep disturbance and suicidal ideas. The patient is not nervous/anxious and is not hyperactive. Grief reaction from  death        Physical Exam  Vitals and nursing note reviewed. Constitutional:       Appearance: Normal appearance. She is ill-appearing. HENT:      Head: Normocephalic. Right Ear: Tympanic membrane is bulging. Left Ear: Tenderness present. Tympanic membrane is erythematous and bulging. Mouth/Throat:      Pharynx: Oropharynx is clear. Eyes:      Pupils: Pupils are equal, round, and reactive to light. Comments: Artificial eye    Cardiovascular:      Rate and Rhythm: Normal rate and regular rhythm. Pulses: Normal pulses. Heart sounds: Normal heart sounds. Pulmonary:      Effort: Pulmonary effort is normal.      Breath sounds: Normal breath sounds. No wheezing, rhonchi or rales. Abdominal:      Palpations: Abdomen is soft. Tenderness: There is no abdominal tenderness. Comments: Tenderness on palpation swelling r wrist    Musculoskeletal:      Right wrist: Swelling and tenderness present. Right hand: Tenderness present. Comments: Left ankle and knee   Finger wart    Skin:     General: Skin is warm and dry. Capillary Refill: Capillary refill takes less than 2 seconds. Coloration: Skin is not pale. Neurological:      Mental Status: She is alert and oriented to person, place, and time. Motor: Weakness present.       Coordination: Coordination normal.   Psychiatric:         Mood and Affect: Mood normal.         Behavior: Behavior normal.      Comments: Obsessive-compulsive and depressed along with anxiety she is not suicidal at this time       Assessment and Plan:  Schuyler Del Castillo was seen today for anxiety. Diagnoses and all orders for this visit:    Grief reaction    Mixed hyperlipidemia    Malignant neoplasm of retina, unspecified laterality ([facility] [address]    Depression, unspecified depression type    Sedative, hypnotic or anxiolytic dependence, uncomplicated    Contusion of right wrist, initial encounter    Essential hypertension    Thyroid disease    Anxiety    Unspecified disorder of eye and adnexa    Vitamin B12 deficiency (non anemic)    Benign paroxysmal positional vertigo, unspecified laterality    Weakness    Chest pain, unspecified type    Cancer [facility])        Discussions/Education provided to patients during visit:  [] Discussed the importance to stop smoking. [] Advised to monitor eating habits. [] Reviewed and discussed Imaging results. [] Reviewed and discussed Lab results. [] Discussed the importance of drinking plenty of fluids. [x] Cut down on Salt, Caffeine, and Sugar. [x] Non Compliant Patient   [x] Communicated with patient any concerns, to phone office. [place] on death of    No new labs   1 month         No follow-ups on file.       Seen by:     Tacos Piña DO

## 2023-02-14 DIAGNOSIS — F41.9 ANXIETY: Primary | ICD-10-CM

## 2023-02-14 RX ORDER — LORAZEPAM 0.5 MG/1
0.5 TABLET ORAL 2 TIMES DAILY
Qty: 60 TABLET | Refills: 0 | Status: SHIPPED
Start: 2023-02-14 | End: 2023-02-15 | Stop reason: SDUPTHER

## 2023-02-15 ENCOUNTER — TELEPHONE (OUTPATIENT)
Dept: FAMILY MEDICINE CLINIC | Age: 83
End: 2023-02-15

## 2023-02-15 DIAGNOSIS — F41.9 ANXIETY: ICD-10-CM

## 2023-02-15 RX ORDER — LORAZEPAM 0.5 MG/1
0.5 TABLET ORAL 2 TIMES DAILY
Qty: 60 TABLET | Refills: 0 | Status: SHIPPED | OUTPATIENT
Start: 2023-02-15 | End: 2023-03-17

## 2023-02-15 NOTE — TELEPHONE ENCOUNTER
I called to have the Lorazepam refill cancelled as pt. Is getting this one refill at [facility] where her son can pick it up for her.

## 2023-02-19 ASSESSMENT — ENCOUNTER SYMPTOMS
VOICE CHANGE: 0
CONSTIPATION: 0
EYE ITCHING: 0
RECTAL PAIN: 0
CHEST TIGHTNESS: 0
COUGH: 1
CHOKING: 0
WHEEZING: 0
FACIAL SWELLING: 0
EYE PAIN: 0
NAUSEA: 0
VOMITING: 0
BLOOD IN STOOL: 0
SINUS PAIN: 0
STRIDOR: 0
ABDOMINAL PAIN: 0
BACK PAIN: 0
COLOR CHANGE: 0
APNEA: 0
SINUS PRESSURE: 0
TROUBLE SWALLOWING: 0
EYE REDNESS: 0
EYE DISCHARGE: 0
SORE THROAT: 0
DIARRHEA: 0
ABDOMINAL DISTENTION: 0
ANAL BLEEDING: 0
RHINORRHEA: 0
SHORTNESS OF BREATH: 1
PHOTOPHOBIA: 0

## 2023-02-27 ASSESSMENT — ENCOUNTER SYMPTOMS
SINUS PRESSURE: 0
RHINORRHEA: 0
FACIAL SWELLING: 0
ABDOMINAL DISTENTION: 0
SHORTNESS OF BREATH: 1
ABDOMINAL PAIN: 0
COLOR CHANGE: 0
VOMITING: 0
BLOOD IN STOOL: 0
TROUBLE SWALLOWING: 0
COUGH: 1
SINUS PAIN: 0
STRIDOR: 0
SORE THROAT: 0
APNEA: 0
CHOKING: 0
NAUSEA: 0
ANAL BLEEDING: 0
VOICE CHANGE: 0
BACK PAIN: 0
RECTAL PAIN: 0
EYE PAIN: 0
EYE ITCHING: 0
EYE REDNESS: 0
DIARRHEA: 0
WHEEZING: 0
PHOTOPHOBIA: 0
CONSTIPATION: 0
EYE DISCHARGE: 0

## 2023-04-20 DIAGNOSIS — F41.9 ANXIETY: ICD-10-CM

## 2023-04-20 RX ORDER — LORAZEPAM 0.5 MG/1
0.5 TABLET ORAL 2 TIMES DAILY
Qty: 60 TABLET | Refills: 0 | Status: SHIPPED | OUTPATIENT
Start: 2023-04-20 | End: 2023-05-20

## 2023-04-21 ENCOUNTER — TELEPHONE (OUTPATIENT)
Dept: FAMILY MEDICINE CLINIC | Age: 83
End: 2023-04-21

## 2023-04-21 NOTE — TELEPHONE ENCOUNTER
Per Dr. Tonya Flowers needs a follow up appt. . I called her and she agreed to talk to Domonique Mueller to make an appt. Soon.

## 2023-05-01 ENCOUNTER — OFFICE VISIT (OUTPATIENT)
Dept: FAMILY MEDICINE CLINIC | Age: 83
End: 2023-05-01
Payer: MEDICARE

## 2023-05-01 VITALS
OXYGEN SATURATION: 97 % | WEIGHT: 124.8 LBS | TEMPERATURE: 97.6 F | HEART RATE: 82 BPM | SYSTOLIC BLOOD PRESSURE: 108 MMHG | DIASTOLIC BLOOD PRESSURE: 70 MMHG | BODY MASS INDEX: 24.37 KG/M2 | RESPIRATION RATE: 17 BRPM

## 2023-05-01 DIAGNOSIS — E78.2 MIXED HYPERLIPIDEMIA: ICD-10-CM

## 2023-05-01 DIAGNOSIS — E83.42 HYPOMAGNESEMIA: ICD-10-CM

## 2023-05-01 DIAGNOSIS — E53.8 VITAMIN B12 DEFICIENCY (NON ANEMIC): ICD-10-CM

## 2023-05-01 DIAGNOSIS — C69.20 MALIGNANT NEOPLASM OF RETINA, UNSPECIFIED LATERALITY (HCC): ICD-10-CM

## 2023-05-01 DIAGNOSIS — E07.9 THYROID DISEASE: ICD-10-CM

## 2023-05-01 DIAGNOSIS — I10 PRIMARY HYPERTENSION: Primary | ICD-10-CM

## 2023-05-01 DIAGNOSIS — R53.1 WEAKNESS: ICD-10-CM

## 2023-05-01 DIAGNOSIS — I10 PRIMARY HYPERTENSION: ICD-10-CM

## 2023-05-01 DIAGNOSIS — R25.2 CRAMPS, EXTREMITY: ICD-10-CM

## 2023-05-01 DIAGNOSIS — H57.9 UNSPECIFIED DISORDER OF EYE AND ADNEXA: ICD-10-CM

## 2023-05-01 DIAGNOSIS — C80.1 CANCER (HCC): ICD-10-CM

## 2023-05-01 DIAGNOSIS — M81.0 AGE-RELATED OSTEOPOROSIS WITHOUT CURRENT PATHOLOGICAL FRACTURE: ICD-10-CM

## 2023-05-01 DIAGNOSIS — R42 DIZZINESS: ICD-10-CM

## 2023-05-01 LAB
ALBUMIN SERPL-MCNC: 4.3 G/DL (ref 3.5–5.2)
ALP SERPL-CCNC: 76 U/L (ref 35–104)
ALT SERPL-CCNC: 10 U/L (ref 0–32)
ANION GAP SERPL CALCULATED.3IONS-SCNC: 14 MMOL/L (ref 7–16)
AST SERPL-CCNC: 23 U/L (ref 0–31)
BASOPHILS # BLD: 0.05 E9/L (ref 0–0.2)
BASOPHILS NFR BLD: 0.8 % (ref 0–2)
BILIRUB SERPL-MCNC: 0.4 MG/DL (ref 0–1.2)
BUN SERPL-MCNC: 11 MG/DL (ref 6–23)
CALCIUM SERPL-MCNC: 9.9 MG/DL (ref 8.6–10.2)
CHLORIDE SERPL-SCNC: 105 MMOL/L (ref 98–107)
CHOLESTEROL, TOTAL: 277 MG/DL (ref 0–199)
CO2 SERPL-SCNC: 25 MMOL/L (ref 22–29)
CREAT SERPL-MCNC: 0.7 MG/DL (ref 0.5–1)
EOSINOPHIL # BLD: 0.09 E9/L (ref 0.05–0.5)
EOSINOPHIL NFR BLD: 1.4 % (ref 0–6)
ERYTHROCYTE [DISTWIDTH] IN BLOOD BY AUTOMATED COUNT: 13.7 FL (ref 11.5–15)
FOLATE SERPL-MCNC: 12.7 NG/ML (ref 4.8–24.2)
GLUCOSE SERPL-MCNC: 92 MG/DL (ref 74–99)
HCT VFR BLD AUTO: 48.2 % (ref 34–48)
HDLC SERPL-MCNC: 49 MG/DL
HGB BLD-MCNC: 14.4 G/DL (ref 11.5–15.5)
IMM GRANULOCYTES # BLD: 0.02 E9/L
IMM GRANULOCYTES NFR BLD: 0.3 % (ref 0–5)
LDLC SERPL CALC-MCNC: 198 MG/DL (ref 0–99)
LYMPHOCYTES # BLD: 2.53 E9/L (ref 1.5–4)
LYMPHOCYTES NFR BLD: 40.7 % (ref 20–42)
MAGNESIUM SERPL-MCNC: 2.3 MG/DL (ref 1.6–2.6)
MCH RBC QN AUTO: 28.5 PG (ref 26–35)
MCHC RBC AUTO-ENTMCNC: 29.9 % (ref 32–34.5)
MCV RBC AUTO: 95.4 FL (ref 80–99.9)
MONOCYTES # BLD: 0.47 E9/L (ref 0.1–0.95)
MONOCYTES NFR BLD: 7.6 % (ref 2–12)
NEUTROPHILS # BLD: 3.06 E9/L (ref 1.8–7.3)
NEUTS SEG NFR BLD: 49.2 % (ref 43–80)
PLATELET # BLD AUTO: 321 E9/L (ref 130–450)
PMV BLD AUTO: 10 FL (ref 7–12)
POTASSIUM SERPL-SCNC: 5.1 MMOL/L (ref 3.5–5)
PROT SERPL-MCNC: 8.1 G/DL (ref 6.4–8.3)
RBC # BLD AUTO: 5.05 E12/L (ref 3.5–5.5)
SODIUM SERPL-SCNC: 144 MMOL/L (ref 132–146)
T4 FREE SERPL-MCNC: 1.19 NG/DL (ref 0.93–1.7)
TRIGL SERPL-MCNC: 149 MG/DL (ref 0–149)
TSH SERPL-MCNC: 3.16 UIU/ML (ref 0.27–4.2)
VIT B12 SERPL-MCNC: 220 PG/ML (ref 211–946)
VLDLC SERPL CALC-MCNC: 30 MG/DL
WBC # BLD: 6.2 E9/L (ref 4.5–11.5)

## 2023-05-01 PROCEDURE — G8400 PT W/DXA NO RESULTS DOC: HCPCS | Performed by: FAMILY MEDICINE

## 2023-05-01 PROCEDURE — 99214 OFFICE O/P EST MOD 30 MIN: CPT | Performed by: FAMILY MEDICINE

## 2023-05-01 PROCEDURE — 36415 COLL VENOUS BLD VENIPUNCTURE: CPT | Performed by: FAMILY MEDICINE

## 2023-05-01 PROCEDURE — 1090F PRES/ABSN URINE INCON ASSESS: CPT | Performed by: FAMILY MEDICINE

## 2023-05-01 PROCEDURE — 1036F TOBACCO NON-USER: CPT | Performed by: FAMILY MEDICINE

## 2023-05-01 PROCEDURE — 3074F SYST BP LT 130 MM HG: CPT | Performed by: FAMILY MEDICINE

## 2023-05-01 PROCEDURE — 3078F DIAST BP <80 MM HG: CPT | Performed by: FAMILY MEDICINE

## 2023-05-01 PROCEDURE — G8420 CALC BMI NORM PARAMETERS: HCPCS | Performed by: FAMILY MEDICINE

## 2023-05-01 PROCEDURE — 1123F ACP DISCUSS/DSCN MKR DOCD: CPT | Performed by: FAMILY MEDICINE

## 2023-05-01 PROCEDURE — G8427 DOCREV CUR MEDS BY ELIG CLIN: HCPCS | Performed by: FAMILY MEDICINE

## 2023-05-01 RX ORDER — PAROXETINE 10 MG/1
10 TABLET, FILM COATED ORAL DAILY
Qty: 30 TABLET | Refills: 1 | Status: SHIPPED | OUTPATIENT
Start: 2023-05-01 | End: 2023-05-31

## 2023-05-01 SDOH — ECONOMIC STABILITY: INCOME INSECURITY: HOW HARD IS IT FOR YOU TO PAY FOR THE VERY BASICS LIKE FOOD, HOUSING, MEDICAL CARE, AND HEATING?: NOT HARD AT ALL

## 2023-05-01 SDOH — ECONOMIC STABILITY: HOUSING INSECURITY
IN THE LAST 12 MONTHS, WAS THERE A TIME WHEN YOU DID NOT HAVE A STEADY PLACE TO SLEEP OR SLEPT IN A SHELTER (INCLUDING NOW)?: NO

## 2023-05-01 SDOH — ECONOMIC STABILITY: FOOD INSECURITY: WITHIN THE PAST 12 MONTHS, THE FOOD YOU BOUGHT JUST DIDN'T LAST AND YOU DIDN'T HAVE MONEY TO GET MORE.: NEVER TRUE

## 2023-05-01 SDOH — ECONOMIC STABILITY: FOOD INSECURITY: WITHIN THE PAST 12 MONTHS, YOU WORRIED THAT YOUR FOOD WOULD RUN OUT BEFORE YOU GOT MONEY TO BUY MORE.: NEVER TRUE

## 2023-05-01 ASSESSMENT — ENCOUNTER SYMPTOMS
VOMITING: 0
SINUS PRESSURE: 0
CHOKING: 0
ABDOMINAL PAIN: 0
WHEEZING: 0
ANAL BLEEDING: 0
EYE REDNESS: 0
FACIAL SWELLING: 0
EYE DISCHARGE: 0
RECTAL PAIN: 0
STRIDOR: 0
CONSTIPATION: 0
DIARRHEA: 0
PHOTOPHOBIA: 0
EYE ITCHING: 0
RHINORRHEA: 0
SINUS PAIN: 0
BLOOD IN STOOL: 0
NAUSEA: 0
BACK PAIN: 0
VOICE CHANGE: 0
APNEA: 0
COLOR CHANGE: 0
EYE PAIN: 0
TROUBLE SWALLOWING: 0
SORE THROAT: 0
ABDOMINAL DISTENTION: 0

## 2023-05-01 NOTE — PROGRESS NOTES
23  Wing Grim : 1940 Sex: female  Age: 79 y.o. Chief Complaint   Patient presents with    Follow-up     Check Up    Medication Check     Pt stated Zoloft- made her feel like she was going to die-so instead patient has been taking a 1/2 @ 8  Wondering if Xanax can be increased   Pt states shes having trouble sleeping/ Very emotional        HPI  overall check up   Grief reaction on    Lightheaded   Very emotional today     Review of Systems   Constitutional:  Negative for activity change, appetite change, chills, diaphoresis, fatigue, fever and unexpected weight change. Overall back to cathing   tires her out    HENT:  Positive for postnasal drip (stidrainage posterior through). Negative for congestion, dental problem, drooling, ear discharge, ear pain, facial swelling, hearing loss, mouth sores, nosebleeds, rhinorrhea, sinus pressure, sinus pain, sneezing, sore throat, tinnitus, trouble swallowing and voice change. Eyes:  Negative for photophobia, pain, discharge, redness, itching and visual disturbance. Respiratory:  Negative for apnea, choking, wheezing and stridor. Cough: has waned. Chest tightness: tenderness from muscles. Shortness of breath: mild. Cardiovascular:  Negative for chest pain, palpitations and leg swelling. Gastrointestinal:  Negative for abdominal distention, abdominal pain, anal bleeding, blood in stool, constipation, diarrhea, nausea, rectal pain and vomiting. Endocrine: Negative for cold intolerance, heat intolerance, polydipsia, polyphagia and polyuria. Genitourinary:  Negative for decreased urine volume, difficulty urinating, dysuria, enuresis, flank pain, frequency, genital sores, hematuria and urgency. Musculoskeletal:  Positive for arthralgias (r hip  left shoulder pain). Negative for back pain, gait problem, joint swelling, myalgias, neck pain and neck stiffness.         Finger wart    Skin:  Negative for color change, pallor,

## 2023-05-11 DIAGNOSIS — F41.9 ANXIETY: ICD-10-CM

## 2023-05-19 RX ORDER — SERTRALINE HYDROCHLORIDE 25 MG/1
12.5 TABLET, FILM COATED ORAL NIGHTLY
Qty: 15 TABLET | Refills: 5 | Status: SHIPPED | OUTPATIENT
Start: 2023-05-19 | End: 2023-06-18

## 2023-05-19 RX ORDER — LORAZEPAM 0.5 MG/1
0.5 TABLET ORAL 2 TIMES DAILY
Qty: 60 TABLET | Refills: 0 | Status: SHIPPED | OUTPATIENT
Start: 2023-05-19 | End: 2023-06-18

## 2023-05-19 NOTE — TELEPHONE ENCOUNTER
Reuben Stephens called in for something else, I asked her about the Zoloft and the Paxil. She is NOT taking the Paxil, after reading the med insert, she is too afraid to take it. She IS taking the Sertraline one half tab at bedtime none in the morning.

## 2023-06-02 ENCOUNTER — OFFICE VISIT (OUTPATIENT)
Dept: FAMILY MEDICINE CLINIC | Age: 83
End: 2023-06-02
Payer: MEDICARE

## 2023-06-02 ENCOUNTER — OFFICE VISIT (OUTPATIENT)
Dept: FAMILY MEDICINE CLINIC | Age: 83
End: 2023-06-02

## 2023-06-02 VITALS
HEIGHT: 60 IN | RESPIRATION RATE: 18 BRPM | BODY MASS INDEX: 24.35 KG/M2 | WEIGHT: 124 LBS | OXYGEN SATURATION: 98 % | TEMPERATURE: 97.5 F | HEART RATE: 75 BPM | SYSTOLIC BLOOD PRESSURE: 110 MMHG | DIASTOLIC BLOOD PRESSURE: 70 MMHG

## 2023-06-02 VITALS
SYSTOLIC BLOOD PRESSURE: 110 MMHG | HEIGHT: 60 IN | HEART RATE: 75 BPM | BODY MASS INDEX: 24.35 KG/M2 | TEMPERATURE: 97.5 F | RESPIRATION RATE: 18 BRPM | WEIGHT: 124 LBS | OXYGEN SATURATION: 98 % | DIASTOLIC BLOOD PRESSURE: 70 MMHG

## 2023-06-02 DIAGNOSIS — R91.8 PULMONARY NODULES: ICD-10-CM

## 2023-06-02 DIAGNOSIS — M81.0 AGE-RELATED OSTEOPOROSIS WITHOUT CURRENT PATHOLOGICAL FRACTURE: ICD-10-CM

## 2023-06-02 DIAGNOSIS — R53.1 WEAKNESS: ICD-10-CM

## 2023-06-02 DIAGNOSIS — I10 PRIMARY HYPERTENSION: Primary | ICD-10-CM

## 2023-06-02 DIAGNOSIS — F32.A DEPRESSION, UNSPECIFIED DEPRESSION TYPE: ICD-10-CM

## 2023-06-02 DIAGNOSIS — F41.9 ANXIETY: ICD-10-CM

## 2023-06-02 DIAGNOSIS — C80.1 CANCER (HCC): ICD-10-CM

## 2023-06-02 DIAGNOSIS — E78.2 MIXED HYPERLIPIDEMIA: ICD-10-CM

## 2023-06-02 DIAGNOSIS — H81.10 BENIGN PAROXYSMAL POSITIONAL VERTIGO, UNSPECIFIED LATERALITY: ICD-10-CM

## 2023-06-02 DIAGNOSIS — E83.42 HYPOMAGNESEMIA: ICD-10-CM

## 2023-06-02 DIAGNOSIS — Z00.00 MEDICARE ANNUAL WELLNESS VISIT, SUBSEQUENT: Primary | ICD-10-CM

## 2023-06-02 DIAGNOSIS — E07.9 THYROID DISEASE: ICD-10-CM

## 2023-06-02 DIAGNOSIS — E53.8 VITAMIN B12 DEFICIENCY (NON ANEMIC): ICD-10-CM

## 2023-06-02 DIAGNOSIS — I10 ESSENTIAL HYPERTENSION: ICD-10-CM

## 2023-06-02 DIAGNOSIS — C69.20 MALIGNANT NEOPLASM OF RETINA, UNSPECIFIED LATERALITY (HCC): ICD-10-CM

## 2023-06-02 PROCEDURE — 3078F DIAST BP <80 MM HG: CPT | Performed by: FAMILY MEDICINE

## 2023-06-02 PROCEDURE — 1123F ACP DISCUSS/DSCN MKR DOCD: CPT | Performed by: FAMILY MEDICINE

## 2023-06-02 PROCEDURE — 3074F SYST BP LT 130 MM HG: CPT | Performed by: FAMILY MEDICINE

## 2023-06-02 PROCEDURE — G0439 PPPS, SUBSEQ VISIT: HCPCS | Performed by: FAMILY MEDICINE

## 2023-06-02 RX ORDER — SERTRALINE HYDROCHLORIDE 25 MG/1
12.5 TABLET, FILM COATED ORAL NIGHTLY
Qty: 15 TABLET | Refills: 5 | Status: SHIPPED | OUTPATIENT
Start: 2023-06-02 | End: 2023-07-02

## 2023-06-02 RX ORDER — CHOLECALCIFEROL (VITAMIN D3) 125 MCG
500 CAPSULE ORAL DAILY
Qty: 30 TABLET | Refills: 5 | Status: SHIPPED | OUTPATIENT
Start: 2023-06-02 | End: 2024-06-01

## 2023-06-02 RX ORDER — LORAZEPAM 0.5 MG/1
0.5 TABLET ORAL 2 TIMES DAILY
Qty: 60 TABLET | Refills: 0 | Status: SHIPPED
Start: 2023-06-02 | End: 2023-06-19 | Stop reason: SDUPTHER

## 2023-06-02 SDOH — ECONOMIC STABILITY: FOOD INSECURITY: WITHIN THE PAST 12 MONTHS, THE FOOD YOU BOUGHT JUST DIDN'T LAST AND YOU DIDN'T HAVE MONEY TO GET MORE.: NEVER TRUE

## 2023-06-02 SDOH — ECONOMIC STABILITY: INCOME INSECURITY: HOW HARD IS IT FOR YOU TO PAY FOR THE VERY BASICS LIKE FOOD, HOUSING, MEDICAL CARE, AND HEATING?: NOT HARD AT ALL

## 2023-06-02 SDOH — ECONOMIC STABILITY: FOOD INSECURITY: WITHIN THE PAST 12 MONTHS, YOU WORRIED THAT YOUR FOOD WOULD RUN OUT BEFORE YOU GOT MONEY TO BUY MORE.: NEVER TRUE

## 2023-06-02 ASSESSMENT — PATIENT HEALTH QUESTIONNAIRE - PHQ9
SUM OF ALL RESPONSES TO PHQ QUESTIONS 1-9: 10
SUM OF ALL RESPONSES TO PHQ QUESTIONS 1-9: 10
6. FEELING BAD ABOUT YOURSELF - OR THAT YOU ARE A FAILURE OR HAVE LET YOURSELF OR YOUR FAMILY DOWN: 0
7. TROUBLE CONCENTRATING ON THINGS, SUCH AS READING THE NEWSPAPER OR WATCHING TELEVISION: 1
SUM OF ALL RESPONSES TO PHQ9 QUESTIONS 1 & 2: 3
5. POOR APPETITE OR OVEREATING: 0
1. LITTLE INTEREST OR PLEASURE IN DOING THINGS: 0
SUM OF ALL RESPONSES TO PHQ QUESTIONS 1-9: 10
9. THOUGHTS THAT YOU WOULD BE BETTER OFF DEAD, OR OF HURTING YOURSELF: 0
4. FEELING TIRED OR HAVING LITTLE ENERGY: 3
3. TROUBLE FALLING OR STAYING ASLEEP: 3
SUM OF ALL RESPONSES TO PHQ QUESTIONS 1-9: 10
2. FEELING DOWN, DEPRESSED OR HOPELESS: 3
10. IF YOU CHECKED OFF ANY PROBLEMS, HOW DIFFICULT HAVE THESE PROBLEMS MADE IT FOR YOU TO DO YOUR WORK, TAKE CARE OF THINGS AT HOME, OR GET ALONG WITH OTHER PEOPLE: 1
8. MOVING OR SPEAKING SO SLOWLY THAT OTHER PEOPLE COULD HAVE NOTICED. OR THE OPPOSITE, BEING SO FIGETY OR RESTLESS THAT YOU HAVE BEEN MOVING AROUND A LOT MORE THAN USUAL: 0

## 2023-06-02 ASSESSMENT — ENCOUNTER SYMPTOMS
BLOOD IN STOOL: 0
TROUBLE SWALLOWING: 0
EYE PAIN: 0
RHINORRHEA: 0
WHEEZING: 0
ANAL BLEEDING: 0
SINUS PRESSURE: 0
SHORTNESS OF BREATH: 1
VOICE CHANGE: 0
FACIAL SWELLING: 0
DIARRHEA: 0
CHEST TIGHTNESS: 1
COLOR CHANGE: 0
EYE DISCHARGE: 0
CONSTIPATION: 0
ABDOMINAL DISTENTION: 0
ABDOMINAL PAIN: 0
NAUSEA: 0
STRIDOR: 0
BACK PAIN: 0
EYE REDNESS: 0
EYE ITCHING: 0
RECTAL PAIN: 0
SINUS PAIN: 0
VOMITING: 0
APNEA: 0
PHOTOPHOBIA: 0
COUGH: 1
CHOKING: 0
SORE THROAT: 0

## 2023-06-02 ASSESSMENT — LIFESTYLE VARIABLES
HOW MANY STANDARD DRINKS CONTAINING ALCOHOL DO YOU HAVE ON A TYPICAL DAY: PATIENT DOES NOT DRINK
HOW OFTEN DO YOU HAVE A DRINK CONTAINING ALCOHOL: NEVER

## 2023-06-06 NOTE — PATIENT INSTRUCTIONS
Learning About Emotional Support  When do you need emotional support? You might find getting support from others helpful when you have a long-term health problem. Often people feel alone, confused, or scared when coping with an illness. But you aren't alone. Other people are going through the same thing you are and know how you feel. Talking with others about your feelings can help you feel better. Your family and friends can give you support. So can your doctor, a support group, or a Caodaism. If you have a support network, you will not feel as alone. You will learn new ways to deal with your situation, and you may try harder to overcome it. Where you can get support  Family and friends: They can help you cope by giving you comfort and encouragement. Counseling: Professional counseling can help you cope with situations that interfere with your life and cause stress. Counseling can help you understand and deal with your illness. Your doctor: Find a doctor you trust and feel comfortable with. Be open and honest about your fears and concerns. Your doctor can help you get the right medical treatments, including counseling. Spiritual or Orthodoxy groups: They can provide comfort and may be able to help you find counseling or other social support services. Social groups: They can help you meet new people and get involved in activities you enjoy. Community support groups: In a support group, you can talk to others who have dealt with the same problems or illness as you. You can encourage one another and learn ways to cope with tough emotions. How to find a support group  Ask your doctor, counselor, or other health professional for suggestions. Contact your local Caodaism, Yazidism, Jain, or other Orthodoxy group. Ask your family and friends. Ask people who have the same health concerns. Go online. Forums and blogs let you read messages from others and leave your own messages.  You can exchange stories,

## 2023-06-06 NOTE — PROGRESS NOTES
Medicare Annual Wellness Visit    Mg Bess is here for Medicare AWV (Awv today)    Assessment & Plan   Medicare annual wellness visit, subsequent  Recommendations for Preventive Services Due: see orders and patient instructions/AVS.  Recommended screening schedule for the next 5-10 years is provided to the patient in written form: see Patient Instructions/AVS.     Return for Medicare Annual Wellness Visit in 1 year. Subjective       Patient's complete Health Risk Assessment and screening values have been reviewed and are found in Flowsheets. The following problems were reviewed today and where indicated follow up appointments were made and/or referrals ordered. Positive Risk Factor Screenings with Interventions:        Depression:       Interpretation:   1-4 = minimal  5-9 = mild  10-14 = moderate  15-19 = moderately severe  20-27 = severe  Interventions:  Patient advised to follow-up in this office for further evaluation and treatment          General HRA Questions:  Select all that apply: (!) Loneliness    Loneliness Interventions:  Patient advised to follow up in the office for further evaluation and treatment      Social and Emotional Support:  Do you get the social and emotional support that you need?: (!) No  Interventions:  Patient advised to follow up in the office for further evaluation and treatment                          Objective   Vitals:    06/02/23 1021   BP: 110/70   Pulse: 75   Resp: 18   Temp: 97.5 °F (36.4 °C)   TempSrc: Temporal   SpO2: 98%   Weight: 124 lb (56.2 kg)   Height: 5' (1.524 m)      Body mass index is 24.22 kg/m². Allergies   Allergen Reactions    Adhesive Tape Hives    Pravachol [Pravastatin]     Toprol Xl [Metoprolol]     Zithromax [Azithromycin]      Prior to Visit Medications    Medication Sig Taking? Authorizing Provider   LORazepam (ATIVAN) 0.5 MG tablet Take 1 tablet by mouth in the morning and 1 tablet in the evening. Do all this for 30 days.  Max

## 2023-06-16 DIAGNOSIS — F41.9 ANXIETY: ICD-10-CM

## 2023-06-19 RX ORDER — LORAZEPAM 0.5 MG/1
0.5 TABLET ORAL 2 TIMES DAILY
Qty: 60 TABLET | Refills: 0 | Status: SHIPPED | OUTPATIENT
Start: 2023-06-19 | End: 2023-07-19

## 2023-07-03 ENCOUNTER — OFFICE VISIT (OUTPATIENT)
Dept: FAMILY MEDICINE CLINIC | Age: 83
End: 2023-07-03
Payer: MEDICARE

## 2023-07-03 VITALS
HEART RATE: 75 BPM | RESPIRATION RATE: 17 BRPM | WEIGHT: 122 LBS | DIASTOLIC BLOOD PRESSURE: 82 MMHG | BODY MASS INDEX: 23.83 KG/M2 | TEMPERATURE: 97.6 F | SYSTOLIC BLOOD PRESSURE: 126 MMHG | OXYGEN SATURATION: 96 %

## 2023-07-03 DIAGNOSIS — E07.9 THYROID DISEASE: ICD-10-CM

## 2023-07-03 DIAGNOSIS — R42 DIZZINESS: ICD-10-CM

## 2023-07-03 DIAGNOSIS — E78.2 MIXED HYPERLIPIDEMIA: ICD-10-CM

## 2023-07-03 DIAGNOSIS — I10 PRIMARY HYPERTENSION: Primary | ICD-10-CM

## 2023-07-03 DIAGNOSIS — R53.1 WEAKNESS: ICD-10-CM

## 2023-07-03 DIAGNOSIS — I10 ESSENTIAL HYPERTENSION: ICD-10-CM

## 2023-07-03 DIAGNOSIS — F43.21 GRIEF REACTION: ICD-10-CM

## 2023-07-03 DIAGNOSIS — E53.8 VITAMIN B12 DEFICIENCY (NON ANEMIC): ICD-10-CM

## 2023-07-03 DIAGNOSIS — M19.90 ARTHRITIS: ICD-10-CM

## 2023-07-03 DIAGNOSIS — H81.10 BENIGN PAROXYSMAL POSITIONAL VERTIGO, UNSPECIFIED LATERALITY: ICD-10-CM

## 2023-07-03 DIAGNOSIS — H57.9 UNSPECIFIED DISORDER OF EYE AND ADNEXA: ICD-10-CM

## 2023-07-03 DIAGNOSIS — M81.0 AGE-RELATED OSTEOPOROSIS WITHOUT CURRENT PATHOLOGICAL FRACTURE: ICD-10-CM

## 2023-07-03 DIAGNOSIS — E83.42 HYPOMAGNESEMIA: ICD-10-CM

## 2023-07-03 DIAGNOSIS — F32.A DEPRESSION, UNSPECIFIED DEPRESSION TYPE: ICD-10-CM

## 2023-07-03 PROCEDURE — 3078F DIAST BP <80 MM HG: CPT | Performed by: FAMILY MEDICINE

## 2023-07-03 PROCEDURE — 99214 OFFICE O/P EST MOD 30 MIN: CPT | Performed by: FAMILY MEDICINE

## 2023-07-03 PROCEDURE — G8420 CALC BMI NORM PARAMETERS: HCPCS | Performed by: FAMILY MEDICINE

## 2023-07-03 PROCEDURE — G8427 DOCREV CUR MEDS BY ELIG CLIN: HCPCS | Performed by: FAMILY MEDICINE

## 2023-07-03 PROCEDURE — G8400 PT W/DXA NO RESULTS DOC: HCPCS | Performed by: FAMILY MEDICINE

## 2023-07-03 PROCEDURE — 1036F TOBACCO NON-USER: CPT | Performed by: FAMILY MEDICINE

## 2023-07-03 PROCEDURE — 1123F ACP DISCUSS/DSCN MKR DOCD: CPT | Performed by: FAMILY MEDICINE

## 2023-07-03 PROCEDURE — 1090F PRES/ABSN URINE INCON ASSESS: CPT | Performed by: FAMILY MEDICINE

## 2023-07-03 PROCEDURE — 3074F SYST BP LT 130 MM HG: CPT | Performed by: FAMILY MEDICINE

## 2023-07-11 ENCOUNTER — TELEPHONE (OUTPATIENT)
Dept: FAMILY MEDICINE CLINIC | Age: 83
End: 2023-07-11

## 2023-07-11 DIAGNOSIS — F41.9 ANXIETY: ICD-10-CM

## 2023-07-11 RX ORDER — LORAZEPAM 0.5 MG/1
0.5 TABLET ORAL 2 TIMES DAILY
Qty: 90 TABLET | Refills: 1 | Status: CANCELLED | OUTPATIENT
Start: 2023-07-11 | End: 2023-08-10

## 2023-07-11 RX ORDER — LORAZEPAM 0.5 MG/1
0.5 TABLET ORAL 2 TIMES DAILY
COMMUNITY
End: 2023-07-12 | Stop reason: SDUPTHER

## 2023-07-11 NOTE — TELEPHONE ENCOUNTER
Pt states that at her last visit, Dr. Michael Sanchez increased her lorazapam 0.5 mg to 2 x night and pt states that she is out, please send refill to [facility], she takes 1 in am and 2 in pm.

## 2023-07-12 DIAGNOSIS — F41.9 ANXIETY: Primary | ICD-10-CM

## 2023-07-12 RX ORDER — LORAZEPAM 0.5 MG/1
TABLET ORAL
Qty: 90 TABLET | Refills: 2 | Status: CANCELLED | OUTPATIENT
Start: 2023-07-12 | End: 2023-10-12

## 2023-07-12 RX ORDER — LORAZEPAM 0.5 MG/1
0.5 TABLET ORAL 2 TIMES DAILY
Qty: 90 TABLET | Refills: 0 | Status: SHIPPED | OUTPATIENT
Start: 2023-07-12 | End: 2023-08-11

## 2023-07-12 NOTE — TELEPHONE ENCOUNTER
Patients last appointment 7/3/2023.   Patients next scheduled appointment   Future Appointments   Date Time Provider [address]   8/4/2023 10:30 AM DO MATT Stephen [facility]   6/7/2024 10:00 AM DO MATT Stephen [facility]

## 2023-07-19 ASSESSMENT — ENCOUNTER SYMPTOMS
BLOOD IN STOOL: 0
NAUSEA: 0
BACK PAIN: 0
EYE REDNESS: 0
SINUS PRESSURE: 0
PHOTOPHOBIA: 0
SHORTNESS OF BREATH: 1
SORE THROAT: 0
DIARRHEA: 0
SINUS PAIN: 0
ABDOMINAL PAIN: 0
CHEST TIGHTNESS: 1
CHOKING: 0
TROUBLE SWALLOWING: 0
ABDOMINAL DISTENTION: 0
EYE ITCHING: 0
VOICE CHANGE: 0
VOMITING: 0
RECTAL PAIN: 0
STRIDOR: 0
FACIAL SWELLING: 0
COLOR CHANGE: 0
RHINORRHEA: 0
ANAL BLEEDING: 0
CONSTIPATION: 0
APNEA: 0
WHEEZING: 0
EYE PAIN: 0
COUGH: 1
EYE DISCHARGE: 0

## 2023-08-04 ENCOUNTER — OFFICE VISIT (OUTPATIENT)
Dept: FAMILY MEDICINE CLINIC | Age: 83
End: 2023-08-04
Payer: MEDICARE

## 2023-08-04 VITALS
SYSTOLIC BLOOD PRESSURE: 134 MMHG | HEIGHT: 60 IN | HEART RATE: 74 BPM | BODY MASS INDEX: 23.75 KG/M2 | TEMPERATURE: 97.6 F | DIASTOLIC BLOOD PRESSURE: 78 MMHG | WEIGHT: 121 LBS | RESPIRATION RATE: 16 BRPM | OXYGEN SATURATION: 95 %

## 2023-08-04 DIAGNOSIS — F32.A DEPRESSION, UNSPECIFIED DEPRESSION TYPE: ICD-10-CM

## 2023-08-04 DIAGNOSIS — E78.2 MIXED HYPERLIPIDEMIA: ICD-10-CM

## 2023-08-04 DIAGNOSIS — E07.9 THYROID DISEASE: ICD-10-CM

## 2023-08-04 DIAGNOSIS — F41.9 ANXIETY: ICD-10-CM

## 2023-08-04 DIAGNOSIS — I10 PRIMARY HYPERTENSION: Primary | ICD-10-CM

## 2023-08-04 DIAGNOSIS — C69.20 MALIGNANT NEOPLASM OF RETINA, UNSPECIFIED LATERALITY (HCC): ICD-10-CM

## 2023-08-04 PROCEDURE — 99213 OFFICE O/P EST LOW 20 MIN: CPT | Performed by: FAMILY MEDICINE

## 2023-08-04 PROCEDURE — 1123F ACP DISCUSS/DSCN MKR DOCD: CPT | Performed by: FAMILY MEDICINE

## 2023-08-04 PROCEDURE — G8420 CALC BMI NORM PARAMETERS: HCPCS | Performed by: FAMILY MEDICINE

## 2023-08-04 PROCEDURE — 3074F SYST BP LT 130 MM HG: CPT | Performed by: FAMILY MEDICINE

## 2023-08-04 PROCEDURE — 1036F TOBACCO NON-USER: CPT | Performed by: FAMILY MEDICINE

## 2023-08-04 PROCEDURE — 1090F PRES/ABSN URINE INCON ASSESS: CPT | Performed by: FAMILY MEDICINE

## 2023-08-04 PROCEDURE — G8400 PT W/DXA NO RESULTS DOC: HCPCS | Performed by: FAMILY MEDICINE

## 2023-08-04 PROCEDURE — G8427 DOCREV CUR MEDS BY ELIG CLIN: HCPCS | Performed by: FAMILY MEDICINE

## 2023-08-04 PROCEDURE — 3078F DIAST BP <80 MM HG: CPT | Performed by: FAMILY MEDICINE

## 2023-08-04 RX ORDER — LORAZEPAM 0.5 MG/1
0.5 TABLET ORAL 3 TIMES DAILY
Qty: 90 TABLET | Refills: 0 | Status: SHIPPED | OUTPATIENT
Start: 2023-08-04 | End: 2023-09-03

## 2023-08-04 ASSESSMENT — ENCOUNTER SYMPTOMS
COUGH: 1
NAUSEA: 0
EYE PAIN: 0
ABDOMINAL PAIN: 0
EYE ITCHING: 0
WHEEZING: 0
SHORTNESS OF BREATH: 1
SORE THROAT: 0
BACK PAIN: 0
PHOTOPHOBIA: 0
EYE REDNESS: 0
ANAL BLEEDING: 0
FACIAL SWELLING: 0
RECTAL PAIN: 0
CONSTIPATION: 0
DIARRHEA: 0
CHEST TIGHTNESS: 1
ABDOMINAL DISTENTION: 0
VOMITING: 0
APNEA: 0
COLOR CHANGE: 0
SINUS PAIN: 0
BLOOD IN STOOL: 0
STRIDOR: 0
TROUBLE SWALLOWING: 0
SINUS PRESSURE: 0
VOICE CHANGE: 0
RHINORRHEA: 0
CHOKING: 0
EYE DISCHARGE: 0

## 2023-08-04 NOTE — PROGRESS NOTES
30    23  [facility] : 1940 Sex: female  Age: 79 y.o. Chief Complaint   Patient presents with    Follow-up     1 month follow up       HPI  overall check up   Still crying a lot and depression over          Review of Systems   Constitutional:  Negative for activity change, appetite change, chills, diaphoresis, fatigue, fever and unexpected weight change. Overall back to cathing   tires her out    HENT:  Positive for postnasal drip (stidrainage posterior through). Negative for congestion, dental problem, drooling, ear discharge, ear pain, facial swelling, hearing loss, mouth sores, nosebleeds, rhinorrhea, sinus pressure, sinus pain, sneezing, sore throat, tinnitus, trouble swallowing and voice change. Eyes:  Negative for photophobia, pain, discharge, redness, itching and visual disturbance. Respiratory:  Positive for cough (has waned), chest tightness (tenderness from muscles) and shortness of breath (mild). Negative for apnea, choking, wheezing and stridor. Cardiovascular:  Negative for chest pain, palpitations and leg swelling. Gastrointestinal:  Negative for abdominal distention, abdominal pain, anal bleeding, blood in stool, constipation, diarrhea, nausea, rectal pain and vomiting. Endocrine: Negative for cold intolerance, heat intolerance, polydipsia, polyphagia and polyuria. Genitourinary:  Negative for decreased urine volume, difficulty urinating, dysuria, enuresis, flank pain, frequency, genital sores, hematuria and urgency. Musculoskeletal:  Positive for arthralgias (sshoulders bilateral). Negative for back pain, gait problem, joint swelling, myalgias, neck pain and neck stiffness. Finger wart    Skin:  Negative for color change, pallor, rash and wound. Allergic/Immunologic: Negative for environmental allergies, food allergies and immunocompromised state. Neurological:  Positive for dizziness, weakness and light-headedness.  Negative for

## 2023-09-08 ENCOUNTER — OFFICE VISIT (OUTPATIENT)
Dept: FAMILY MEDICINE CLINIC | Age: 83
End: 2023-09-08
Payer: MEDICARE

## 2023-09-08 VITALS
BODY MASS INDEX: 23.56 KG/M2 | HEART RATE: 68 BPM | DIASTOLIC BLOOD PRESSURE: 80 MMHG | TEMPERATURE: 97.9 F | HEIGHT: 60 IN | WEIGHT: 120 LBS | RESPIRATION RATE: 18 BRPM | OXYGEN SATURATION: 97 % | SYSTOLIC BLOOD PRESSURE: 122 MMHG

## 2023-09-08 DIAGNOSIS — E53.8 VITAMIN B12 DEFICIENCY (NON ANEMIC): ICD-10-CM

## 2023-09-08 DIAGNOSIS — M81.0 AGE-RELATED OSTEOPOROSIS WITHOUT CURRENT PATHOLOGICAL FRACTURE: ICD-10-CM

## 2023-09-08 DIAGNOSIS — C80.1 CANCER (HCC): ICD-10-CM

## 2023-09-08 DIAGNOSIS — F32.A DEPRESSION, UNSPECIFIED DEPRESSION TYPE: Primary | ICD-10-CM

## 2023-09-08 DIAGNOSIS — R91.8 PULMONARY NODULES: ICD-10-CM

## 2023-09-08 DIAGNOSIS — R42 DIZZINESS: ICD-10-CM

## 2023-09-08 DIAGNOSIS — M19.90 ARTHRITIS: ICD-10-CM

## 2023-09-08 PROCEDURE — 1090F PRES/ABSN URINE INCON ASSESS: CPT | Performed by: FAMILY MEDICINE

## 2023-09-08 PROCEDURE — 3078F DIAST BP <80 MM HG: CPT | Performed by: FAMILY MEDICINE

## 2023-09-08 PROCEDURE — G8427 DOCREV CUR MEDS BY ELIG CLIN: HCPCS | Performed by: FAMILY MEDICINE

## 2023-09-08 PROCEDURE — 1123F ACP DISCUSS/DSCN MKR DOCD: CPT | Performed by: FAMILY MEDICINE

## 2023-09-08 PROCEDURE — 1036F TOBACCO NON-USER: CPT | Performed by: FAMILY MEDICINE

## 2023-09-08 PROCEDURE — G8400 PT W/DXA NO RESULTS DOC: HCPCS | Performed by: FAMILY MEDICINE

## 2023-09-08 PROCEDURE — G8420 CALC BMI NORM PARAMETERS: HCPCS | Performed by: FAMILY MEDICINE

## 2023-09-08 PROCEDURE — 99213 OFFICE O/P EST LOW 20 MIN: CPT | Performed by: FAMILY MEDICINE

## 2023-09-08 PROCEDURE — 3074F SYST BP LT 130 MM HG: CPT | Performed by: FAMILY MEDICINE

## 2023-09-08 RX ORDER — MECLIZINE HCL 12.5 MG/1
12.5 TABLET ORAL 2 TIMES DAILY PRN
Qty: 30 TABLET | Refills: 2 | Status: SHIPPED | OUTPATIENT
Start: 2023-09-08

## 2023-09-08 SDOH — ECONOMIC STABILITY: FOOD INSECURITY: WITHIN THE PAST 12 MONTHS, THE FOOD YOU BOUGHT JUST DIDN'T LAST AND YOU DIDN'T HAVE MONEY TO GET MORE.: NEVER TRUE

## 2023-09-08 SDOH — ECONOMIC STABILITY: FOOD INSECURITY: WITHIN THE PAST 12 MONTHS, YOU WORRIED THAT YOUR FOOD WOULD RUN OUT BEFORE YOU GOT MONEY TO BUY MORE.: NEVER TRUE

## 2023-09-08 SDOH — ECONOMIC STABILITY: INCOME INSECURITY: HOW HARD IS IT FOR YOU TO PAY FOR THE VERY BASICS LIKE FOOD, HOUSING, MEDICAL CARE, AND HEATING?: NOT HARD AT ALL

## 2023-09-08 ASSESSMENT — PATIENT HEALTH QUESTIONNAIRE - PHQ9
2. FEELING DOWN, DEPRESSED OR HOPELESS: 1
SUM OF ALL RESPONSES TO PHQ QUESTIONS 1-9: 1
10. IF YOU CHECKED OFF ANY PROBLEMS, HOW DIFFICULT HAVE THESE PROBLEMS MADE IT FOR YOU TO DO YOUR WORK, TAKE CARE OF THINGS AT HOME, OR GET ALONG WITH OTHER PEOPLE: 0
9. THOUGHTS THAT YOU WOULD BE BETTER OFF DEAD, OR OF HURTING YOURSELF: 0
SUM OF ALL RESPONSES TO PHQ9 QUESTIONS 1 & 2: 1
6. FEELING BAD ABOUT YOURSELF - OR THAT YOU ARE A FAILURE OR HAVE LET YOURSELF OR YOUR FAMILY DOWN: 0
5. POOR APPETITE OR OVEREATING: 0
4. FEELING TIRED OR HAVING LITTLE ENERGY: 0
SUM OF ALL RESPONSES TO PHQ QUESTIONS 1-9: 1
8. MOVING OR SPEAKING SO SLOWLY THAT OTHER PEOPLE COULD HAVE NOTICED. OR THE OPPOSITE, BEING SO FIGETY OR RESTLESS THAT YOU HAVE BEEN MOVING AROUND A LOT MORE THAN USUAL: 0
SUM OF ALL RESPONSES TO PHQ QUESTIONS 1-9: 1
3. TROUBLE FALLING OR STAYING ASLEEP: 0
7. TROUBLE CONCENTRATING ON THINGS, SUCH AS READING THE NEWSPAPER OR WATCHING TELEVISION: 0
1. LITTLE INTEREST OR PLEASURE IN DOING THINGS: 0
SUM OF ALL RESPONSES TO PHQ QUESTIONS 1-9: 1

## 2023-09-08 ASSESSMENT — ENCOUNTER SYMPTOMS
CHOKING: 0
SINUS PRESSURE: 0
EYE DISCHARGE: 0
SINUS PAIN: 0
APNEA: 0
NAUSEA: 0
COLOR CHANGE: 0
VOMITING: 0
FACIAL SWELLING: 0
EYE REDNESS: 0
SHORTNESS OF BREATH: 1
RECTAL PAIN: 0
ANAL BLEEDING: 0
SORE THROAT: 0
ABDOMINAL DISTENTION: 0
COUGH: 1
DIARRHEA: 0
STRIDOR: 0
RHINORRHEA: 0
TROUBLE SWALLOWING: 0
CHEST TIGHTNESS: 1
EYE PAIN: 0
BACK PAIN: 0
EYE ITCHING: 0
WHEEZING: 0
ABDOMINAL PAIN: 0
CONSTIPATION: 0
PHOTOPHOBIA: 0
VOICE CHANGE: 0
BLOOD IN STOOL: 0

## 2023-09-08 NOTE — PROGRESS NOTES
23  Gay Belcher : 1940 Sex: female  Age: 79 y.o. Chief Complaint   Patient presents with    Hypertension     1 month check up,felt dizzy today and yesterday when she getting up in the morning , but better now     Knee Pain     Left knee pain still     Shoulder Pain     Fell 8 days ago hurt her left shoulder       HPIoverall check up   Very distraught and upset   Mulltiple problems     Review of Systems   Constitutional:  Negative for activity change, appetite change, chills, diaphoresis, fatigue, fever and unexpected weight change. Overall back to cathing   tires her out    HENT:  Positive for postnasal drip (stidrainage posterior through). Negative for congestion, dental problem, drooling, ear discharge, ear pain, facial swelling, hearing loss, mouth sores, nosebleeds, rhinorrhea, sinus pressure, sinus pain, sneezing, sore throat, tinnitus, trouble swallowing and voice change. Eyes:  Negative for photophobia, pain, discharge, redness, itching and visual disturbance. Respiratory:  Positive for cough (has waned), chest tightness (tenderness from muscles) and shortness of breath (mild). Negative for apnea, choking, wheezing and stridor. Cardiovascular:  Negative for chest pain, palpitations and leg swelling. Gastrointestinal:  Negative for abdominal distention, abdominal pain, anal bleeding, blood in stool, constipation, diarrhea, nausea, rectal pain and vomiting. Endocrine: Negative for cold intolerance, heat intolerance, polydipsia, polyphagia and polyuria. Genitourinary:  Negative for decreased urine volume, difficulty urinating, dysuria, enuresis, flank pain, frequency, genital sores, hematuria and urgency. Musculoskeletal:  Positive for arthralgias (left knee). Negative for back pain, gait problem, joint swelling, myalgias, neck pain and neck stiffness. Finger wart    Skin:  Negative for color change, pallor, rash and wound.    Allergic/Immunologic:

## 2023-09-18 ENCOUNTER — TRANSCRIBE ORDERS (OUTPATIENT)
Dept: ADMINISTRATIVE | Age: 83
End: 2023-09-18

## 2023-09-18 DIAGNOSIS — R26.0 ATAXIC GAIT: Primary | ICD-10-CM

## 2023-10-09 ENCOUNTER — OFFICE VISIT (OUTPATIENT)
Dept: FAMILY MEDICINE CLINIC | Age: 83
End: 2023-10-09
Payer: MEDICARE

## 2023-10-09 VITALS
HEIGHT: 60 IN | WEIGHT: 121.5 LBS | BODY MASS INDEX: 23.85 KG/M2 | TEMPERATURE: 98.4 F | OXYGEN SATURATION: 95 % | RESPIRATION RATE: 16 BRPM | DIASTOLIC BLOOD PRESSURE: 86 MMHG | SYSTOLIC BLOOD PRESSURE: 134 MMHG | HEART RATE: 70 BPM

## 2023-10-09 DIAGNOSIS — F41.9 ANXIETY: ICD-10-CM

## 2023-10-09 DIAGNOSIS — C69.20 MALIGNANT NEOPLASM OF RETINA, UNSPECIFIED LATERALITY (HCC): Primary | ICD-10-CM

## 2023-10-09 DIAGNOSIS — I10 PRIMARY HYPERTENSION: ICD-10-CM

## 2023-10-09 DIAGNOSIS — E07.9 THYROID DISEASE: ICD-10-CM

## 2023-10-09 DIAGNOSIS — F43.21 GRIEF REACTION: ICD-10-CM
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
## 2023-10-09 DIAGNOSIS — M19.90 ARTHRITIS: ICD-10-CM

## 2023-10-09 DIAGNOSIS — H57.9 UNSPECIFIED DISORDER OF EYE AND ADNEXA: ICD-10-CM

## 2023-10-09 DIAGNOSIS — E78.2 MIXED HYPERLIPIDEMIA: ICD-10-CM

## 2023-10-09 PROCEDURE — G8400 PT W/DXA NO RESULTS DOC: HCPCS | Performed by: FAMILY MEDICINE

## 2023-10-09 PROCEDURE — G8420 CALC BMI NORM PARAMETERS: HCPCS | Performed by: FAMILY MEDICINE

## 2023-10-09 PROCEDURE — 3079F DIAST BP 80-89 MM HG: CPT | Performed by: FAMILY MEDICINE

## 2023-10-09 PROCEDURE — 99213 OFFICE O/P EST LOW 20 MIN: CPT | Performed by: FAMILY MEDICINE

## 2023-10-09 PROCEDURE — 3075F SYST BP GE 130 - 139MM HG: CPT | Performed by: FAMILY MEDICINE

## 2023-10-09 PROCEDURE — 1036F TOBACCO NON-USER: CPT | Performed by: FAMILY MEDICINE

## 2023-10-09 PROCEDURE — 1123F ACP DISCUSS/DSCN MKR DOCD: CPT | Performed by: FAMILY MEDICINE

## 2023-10-09 PROCEDURE — 1090F PRES/ABSN URINE INCON ASSESS: CPT | Performed by: FAMILY MEDICINE

## 2023-10-09 PROCEDURE — G8484 FLU IMMUNIZE NO ADMIN: HCPCS | Performed by: FAMILY MEDICINE

## 2023-10-09 PROCEDURE — G8427 DOCREV CUR MEDS BY ELIG CLIN: HCPCS | Performed by: FAMILY MEDICINE

## 2023-10-09 RX ORDER — SERTRALINE HYDROCHLORIDE 25 MG/1
TABLET, FILM COATED ORAL
COMMUNITY
Start: 2023-10-03

## 2023-10-09 RX ORDER — LORAZEPAM 0.5 MG/1
TABLET ORAL
Qty: 90 TABLET | Refills: 0 | Status: SHIPPED | OUTPATIENT
Start: 2023-10-09 | End: 2023-11-09

## 2023-10-09 RX ORDER — LORAZEPAM 0.5 MG/1
TABLET ORAL
COMMUNITY
Start: 2023-09-22 | End: 2023-10-09 | Stop reason: SDUPTHER

## 2023-10-09 ASSESSMENT — ENCOUNTER SYMPTOMS
RECTAL PAIN: 0
APNEA: 0
RHINORRHEA: 0
SINUS PRESSURE: 0
EYE PAIN: 0
SORE THROAT: 0
CHEST TIGHTNESS: 1
VOICE CHANGE: 0
BLOOD IN STOOL: 0
BACK PAIN: 0
VOMITING: 0
PHOTOPHOBIA: 0
NAUSEA: 0
EYE ITCHING: 0
COLOR CHANGE: 0
CHOKING: 0
ABDOMINAL PAIN: 0
TROUBLE SWALLOWING: 0
SINUS PAIN: 0
ANAL BLEEDING: 0
ABDOMINAL DISTENTION: 0
CONSTIPATION: 0
EYE DISCHARGE: 0
STRIDOR: 0
DIARRHEA: 0
EYE REDNESS: 0
WHEEZING: 0
FACIAL SWELLING: 0

## 2023-10-09 NOTE — PROGRESS NOTES
10/9/23  Abebe rByan : 1940 Sex: female  Age: 79 y.o. Chief Complaint   Patient presents with    Fall     Has fallen twice since last seen, had cortisone injection in left knee, and hornet stings and seen at [facility] urgent care, has a head CT scheduled scheduled this week       HPI  overall check up   Ct scan of head set up by Dr Yao Wise still goes on        Review of Systems   Constitutional:  Negative for activity change, appetite change, chills, diaphoresis, fatigue, fever and unexpected weight change. Overall back to cathing   tires her out    HENT:  Positive for postnasal drip (stidrainage posterior through). Negative for congestion, dental problem, drooling, ear discharge, ear pain, facial swelling, hearing loss, mouth sores, nosebleeds, rhinorrhea, sinus pressure, sinus pain, sneezing, sore throat, tinnitus, trouble swallowing and voice change. Eyes:  Negative for photophobia, pain, discharge, redness, itching and visual disturbance. Respiratory:  Positive for chest tightness (tenderness from muscles). Negative for apnea, choking, wheezing and stridor. Cough: has waned. Shortness of breath: mild. Cardiovascular:  Negative for chest pain, palpitations and leg swelling. Gastrointestinal:  Negative for abdominal distention, abdominal pain, anal bleeding, blood in stool, constipation, diarrhea, nausea, rectal pain and vomiting. Endocrine: Negative for cold intolerance, heat intolerance, polydipsia, polyphagia and polyuria. Genitourinary:  Negative for decreased urine volume, difficulty urinating, dysuria, enuresis, flank pain, frequency, genital sores, hematuria and urgency. Musculoskeletal:  Positive for arthralgias (left knee). Negative for back pain, gait problem, joint swelling, myalgias, neck pain and neck stiffness. Finger wart    Skin:  Negative for color change, pallor, rash and wound.    Allergic/Immunologic: Negative for

## 2023-10-14 ENCOUNTER — HOSPITAL ENCOUNTER (OUTPATIENT)
Dept: CT IMAGING | Age: 83
End: 2023-10-14
Payer: MEDICARE

## 2023-10-14 DIAGNOSIS — R26.0 ATAXIC GAIT: ICD-10-CM

## 2023-10-14 PROCEDURE — 70450 CT HEAD/BRAIN W/O DYE: CPT

## 2023-10-23 ENCOUNTER — TELEPHONE (OUTPATIENT)
Dept: FAMILY MEDICINE CLINIC | Age: 83
End: 2023-10-23

## 2023-10-23 NOTE — TELEPHONE ENCOUNTER
----- Message from Kevyn Ponce MA sent at 10/23/2023 10:54 AM EDT -----  Subject: Results Request    QUESTIONS  Results: CT HEAD WO CONTRAST; Ordered by: Jey Hoffman   Date Performed: 2023-10-14  ---------------------------------------------------------------------------  --------------  [place] [facility] [place]    [phone];  Do not leave any message, patient will call back for answer  ---------------------------------------------------------------------------  --------------

## 2023-11-13 DIAGNOSIS — F43.21 GRIEF REACTION: ICD-10-CM

## 2023-11-13 DIAGNOSIS — C69.20 MALIGNANT NEOPLASM OF RETINA, UNSPECIFIED LATERALITY (HCC): ICD-10-CM

## 2023-11-13 DIAGNOSIS — F41.9 ANXIETY: ICD-10-CM

## 2023-11-13 RX ORDER — LORAZEPAM 0.5 MG/1
TABLET ORAL
Qty: 90 TABLET | Refills: 0 | Status: SHIPPED | OUTPATIENT
Start: 2023-11-13 | End: 2023-12-14

## 2023-11-13 NOTE — TELEPHONE ENCOUNTER
Patients last appointment 10/9/2023.   Patients next scheduled appointment   Future Appointments   Date Time Provider [address]   11/17/2023  3:15 PM DO MATT Everett [facility]   6/7/2024 10:00 AM DO MATT Rowe [facility]

## 2024-01-03 DIAGNOSIS — F41.9 ANXIETY: ICD-10-CM

## 2024-01-03 DIAGNOSIS — F43.21 GRIEF REACTION: ICD-10-CM

## 2024-01-03 DIAGNOSIS — C69.20 MALIGNANT NEOPLASM OF RETINA, UNSPECIFIED LATERALITY (HCC): ICD-10-CM

## 2024-01-03 DIAGNOSIS — F41.9 ANXIETY: Primary | ICD-10-CM

## 2024-01-03 RX ORDER — LORAZEPAM 0.5 MG/1
TABLET ORAL
Qty: 60 TABLET | Refills: 2 | Status: CANCELLED | OUTPATIENT
Start: 2024-01-03

## 2024-01-03 RX ORDER — LORAZEPAM 0.5 MG/1
TABLET ORAL
Qty: 60 TABLET | Refills: 2 | Status: SHIPPED | OUTPATIENT
Start: 2024-01-03 | End: 2024-03-04

## 2024-01-03 NOTE — TELEPHONE ENCOUNTER
Patients last appointment 10/9/2023.  Patients next scheduled appointment   Future Appointments   Date Time Provider Department Center   6/7/2024 10:00 AM Vazquez Little DO [facility]

## 2024-01-31 ENCOUNTER — TELEPHONE (OUTPATIENT)
Dept: FAMILY MEDICINE CLINIC | Age: 84
End: 2024-01-31

## 2024-01-31 NOTE — TELEPHONE ENCOUNTER
Would like to talk to Dr. Little about her mom. States she wants to tell Dr. Little what's going on lately, and that Yecenia said she is not taking her meds as she does not feel they are helping. And some other concerns.     Ritika's phone number is [phone].

## 2024-02-16 ENCOUNTER — OFFICE VISIT (OUTPATIENT)
Dept: FAMILY MEDICINE CLINIC | Age: 84
End: 2024-02-16
Payer: MEDICARE

## 2024-02-16 VITALS
OXYGEN SATURATION: 92 % | SYSTOLIC BLOOD PRESSURE: 138 MMHG | RESPIRATION RATE: 16 BRPM | WEIGHT: 115.7 LBS | HEIGHT: 60 IN | DIASTOLIC BLOOD PRESSURE: 88 MMHG | TEMPERATURE: 98.4 F | HEART RATE: 125 BPM | BODY MASS INDEX: 22.71 KG/M2

## 2024-02-16 DIAGNOSIS — H81.10 BENIGN PAROXYSMAL POSITIONAL VERTIGO, UNSPECIFIED LATERALITY: ICD-10-CM

## 2024-02-16 DIAGNOSIS — I10 PRIMARY HYPERTENSION: ICD-10-CM

## 2024-02-16 DIAGNOSIS — F43.21 GRIEF REACTION: ICD-10-CM

## 2024-02-16 DIAGNOSIS — F32.A DEPRESSION, UNSPECIFIED DEPRESSION TYPE: ICD-10-CM

## 2024-02-16 DIAGNOSIS — C69.20 MALIGNANT NEOPLASM OF RETINA, UNSPECIFIED LATERALITY (HCC): ICD-10-CM

## 2024-02-16 DIAGNOSIS — H57.9 UNSPECIFIED DISORDER OF EYE AND ADNEXA: ICD-10-CM

## 2024-02-16 DIAGNOSIS — F41.9 ANXIETY: Primary | ICD-10-CM

## 2024-02-16 DIAGNOSIS — E78.2 MIXED HYPERLIPIDEMIA: ICD-10-CM

## 2024-02-16 DIAGNOSIS — E07.9 THYROID DISEASE: ICD-10-CM

## 2024-02-16 DIAGNOSIS — C69.90: ICD-10-CM

## 2024-02-16 PROCEDURE — G8484 FLU IMMUNIZE NO ADMIN: HCPCS | Performed by: FAMILY MEDICINE

## 2024-02-16 PROCEDURE — G8420 CALC BMI NORM PARAMETERS: HCPCS | Performed by: FAMILY MEDICINE

## 2024-02-16 PROCEDURE — 1123F ACP DISCUSS/DSCN MKR DOCD: CPT | Performed by: FAMILY MEDICINE

## 2024-02-16 PROCEDURE — G8400 PT W/DXA NO RESULTS DOC: HCPCS | Performed by: FAMILY MEDICINE

## 2024-02-16 PROCEDURE — 1036F TOBACCO NON-USER: CPT | Performed by: FAMILY MEDICINE

## 2024-02-16 PROCEDURE — 99214 OFFICE O/P EST MOD 30 MIN: CPT | Performed by: FAMILY MEDICINE

## 2024-02-16 PROCEDURE — 3079F DIAST BP 80-89 MM HG: CPT | Performed by: FAMILY MEDICINE

## 2024-02-16 PROCEDURE — 3075F SYST BP GE 130 - 139MM HG: CPT | Performed by: FAMILY MEDICINE

## 2024-02-16 PROCEDURE — G8427 DOCREV CUR MEDS BY ELIG CLIN: HCPCS | Performed by: FAMILY MEDICINE

## 2024-02-16 PROCEDURE — 1090F PRES/ABSN URINE INCON ASSESS: CPT | Performed by: FAMILY MEDICINE

## 2024-02-16 RX ORDER — LORAZEPAM 0.5 MG/1
0.5 TABLET ORAL EVERY 6 HOURS PRN
Qty: 60 TABLET | Refills: 1 | Status: SHIPPED | OUTPATIENT
Start: 2024-02-16 | End: 2024-03-17

## 2024-02-16 ASSESSMENT — PATIENT HEALTH QUESTIONNAIRE - PHQ9
5. POOR APPETITE OR OVEREATING: 3
8. MOVING OR SPEAKING SO SLOWLY THAT OTHER PEOPLE COULD HAVE NOTICED. OR THE OPPOSITE, BEING SO FIGETY OR RESTLESS THAT YOU HAVE BEEN MOVING AROUND A LOT MORE THAN USUAL: 3
SUM OF ALL RESPONSES TO PHQ QUESTIONS 1-9: 15
SUM OF ALL RESPONSES TO PHQ QUESTIONS 1-9: 15
6. FEELING BAD ABOUT YOURSELF - OR THAT YOU ARE A FAILURE OR HAVE LET YOURSELF OR YOUR FAMILY DOWN: 0
7. TROUBLE CONCENTRATING ON THINGS, SUCH AS READING THE NEWSPAPER OR WATCHING TELEVISION: 0
10. IF YOU CHECKED OFF ANY PROBLEMS, HOW DIFFICULT HAVE THESE PROBLEMS MADE IT FOR YOU TO DO YOUR WORK, TAKE CARE OF THINGS AT HOME, OR GET ALONG WITH OTHER PEOPLE: 3
SUM OF ALL RESPONSES TO PHQ QUESTIONS 1-9: 15
9. THOUGHTS THAT YOU WOULD BE BETTER OFF DEAD, OR OF HURTING YOURSELF: 0
SUM OF ALL RESPONSES TO PHQ9 QUESTIONS 1 & 2: 3
2. FEELING DOWN, DEPRESSED OR HOPELESS: 3
3. TROUBLE FALLING OR STAYING ASLEEP: 3
1. LITTLE INTEREST OR PLEASURE IN DOING THINGS: 0
SUM OF ALL RESPONSES TO PHQ QUESTIONS 1-9: 15
4. FEELING TIRED OR HAVING LITTLE ENERGY: 3

## 2024-02-16 NOTE — PROGRESS NOTES
24  Yecenia Haro : 1940 Sex: female  Age: 83 y.o.    Chief Complaint   Patient presents with    Depression     Patient is still struggling with the death of her -went to see therapy and was given other medications and is not happy with that doctor and therapist-stopped taking the medication given to her 2 weeks ago       HPI  overall check up  Depression problems   Highly emotional     Review of Systems   Constitutional:  Negative for activity change, appetite change, chills, diaphoresis, fatigue, fever and unexpected weight change.        Overall back to cathing   tires her out    HENT:  Positive for postnasal drip (stidrainage posterior through). Negative for congestion, dental problem, drooling, ear discharge, ear pain, facial swelling, hearing loss, mouth sores, nosebleeds, rhinorrhea, sinus pressure, sinus pain, sneezing, sore throat, tinnitus, trouble swallowing and voice change.    Eyes:  Negative for photophobia, pain, discharge, redness, itching and visual disturbance.   Respiratory:  Positive for chest tightness (tenderness from muscles). Negative for apnea, choking, wheezing and stridor. Cough: has waned. Shortness of breath: mild.   Cardiovascular:  Negative for chest pain, palpitations and leg swelling.   Gastrointestinal:  Negative for abdominal distention, abdominal pain, anal bleeding, blood in stool, constipation, diarrhea, nausea, rectal pain and vomiting.   Endocrine: Negative for cold intolerance, heat intolerance, polydipsia, polyphagia and polyuria.   Genitourinary:  Negative for decreased urine volume, difficulty urinating, dysuria, enuresis, flank pain, frequency, genital sores, hematuria and urgency.   Musculoskeletal:  Positive for arthralgias (left knee). Negative for back pain, gait problem, joint swelling, myalgias, neck pain and neck stiffness.        Finger wart    Skin:  Negative for color change, pallor, rash and wound.   Allergic/Immunologic:

## 2024-03-07 ENCOUNTER — TELEPHONE (OUTPATIENT)
Dept: FAMILY MEDICINE CLINIC | Age: 84
End: 2024-03-07

## 2024-03-07 NOTE — TELEPHONE ENCOUNTER
Pt called and states that she has been crying all day and took her Lorazepam at 6:30 this morning and just keeps crying and wanted to know if she could take another one now? I asked Cinthya and Cinthya said that was fine she could take another one now and no more the rest of the day. Pt informed

## 2024-03-15 ENCOUNTER — OFFICE VISIT (OUTPATIENT)
Dept: FAMILY MEDICINE CLINIC | Age: 84
End: 2024-03-15

## 2024-03-15 VITALS
DIASTOLIC BLOOD PRESSURE: 78 MMHG | WEIGHT: 113.8 LBS | OXYGEN SATURATION: 97 % | HEART RATE: 101 BPM | HEIGHT: 60 IN | SYSTOLIC BLOOD PRESSURE: 146 MMHG | RESPIRATION RATE: 16 BRPM | TEMPERATURE: 98.6 F | BODY MASS INDEX: 22.34 KG/M2

## 2024-03-15 DIAGNOSIS — C69.20 MALIGNANT NEOPLASM OF RETINA, UNSPECIFIED LATERALITY (HCC): ICD-10-CM

## 2024-03-15 DIAGNOSIS — R42 DIZZINESS: ICD-10-CM

## 2024-03-15 DIAGNOSIS — F43.21 GRIEF REACTION: ICD-10-CM

## 2024-03-15 DIAGNOSIS — E07.9 THYROID DISEASE: ICD-10-CM

## 2024-03-15 DIAGNOSIS — H81.10 BENIGN PAROXYSMAL POSITIONAL VERTIGO, UNSPECIFIED LATERALITY: ICD-10-CM

## 2024-03-15 DIAGNOSIS — F32.A DEPRESSION, UNSPECIFIED DEPRESSION TYPE: Primary | ICD-10-CM

## 2024-03-15 DIAGNOSIS — E53.8 VITAMIN B12 DEFICIENCY (NON ANEMIC): ICD-10-CM

## 2024-03-15 DIAGNOSIS — M81.0 AGE-RELATED OSTEOPOROSIS WITHOUT CURRENT PATHOLOGICAL FRACTURE: ICD-10-CM

## 2024-03-15 DIAGNOSIS — F41.9 ANXIETY: ICD-10-CM

## 2024-03-15 RX ORDER — MECLIZINE HCL 12.5 MG/1
12.5 TABLET ORAL 2 TIMES DAILY PRN
Qty: 30 TABLET | Refills: 2 | Status: CANCELLED | OUTPATIENT
Start: 2024-03-15

## 2024-03-15 RX ORDER — LORAZEPAM 0.5 MG/1
TABLET ORAL
Qty: 90 TABLET | Refills: 1 | Status: SHIPPED | OUTPATIENT
Start: 2024-03-15 | End: 2024-04-15

## 2024-03-15 RX ORDER — MIRTAZAPINE 7.5 MG/1
7.5 TABLET, FILM COATED ORAL NIGHTLY
Qty: 30 TABLET | Refills: 5 | Status: SHIPPED | OUTPATIENT
Start: 2024-03-15

## 2024-03-15 NOTE — PROGRESS NOTES
3/15/24  Yecenia Haro : 1940 Sex: female  Age: 83 y.o.    Chief Complaint   Patient presents with    Follow-up     1 month follow up on medications       HPI  check up   Still extreme depresion   Vision problems       Review of Systems   Constitutional:  Negative for activity change, appetite change, chills, diaphoresis, fatigue, fever and unexpected weight change.        Still greiving  death and difficult to live with son ,very difficult circumstances    HENT:  Positive for postnasal drip (stidrainage posterior through). Negative for congestion, dental problem, drooling, ear discharge, ear pain, facial swelling, hearing loss, mouth sores, nosebleeds, rhinorrhea, sinus pressure, sinus pain, sneezing, sore throat, tinnitus, trouble swallowing and voice change.    Eyes:  Negative for photophobia, pain, discharge, redness, itching and visual disturbance.   Respiratory:  Positive for chest tightness (tenderness from muscles). Negative for apnea, choking, wheezing and stridor. Cough: has waned. Shortness of breath: mild.   Cardiovascular:  Negative for chest pain, palpitations and leg swelling.   Gastrointestinal:  Negative for abdominal distention, abdominal pain, anal bleeding, blood in stool, constipation, diarrhea, nausea, rectal pain and vomiting.   Endocrine: Negative for cold intolerance, heat intolerance, polydipsia, polyphagia and polyuria.   Genitourinary:  Negative for decreased urine volume, difficulty urinating, dysuria, enuresis, flank pain, frequency, genital sores, hematuria and urgency.   Musculoskeletal:  Positive for arthralgias (left knee). Negative for back pain, gait problem, joint swelling, myalgias, neck pain and neck stiffness.        Finger wart    Skin:  Negative for color change, pallor, rash and wound.   Allergic/Immunologic: Negative for environmental allergies, food allergies and immunocompromised state.   Neurological:  Positive for dizziness, weakness and

## 2024-04-15 ENCOUNTER — OFFICE VISIT (OUTPATIENT)
Dept: FAMILY MEDICINE CLINIC | Age: 84
End: 2024-04-15
Payer: MEDICARE

## 2024-04-15 VITALS
RESPIRATION RATE: 16 BRPM | OXYGEN SATURATION: 95 % | HEIGHT: 60 IN | DIASTOLIC BLOOD PRESSURE: 82 MMHG | HEART RATE: 80 BPM | BODY MASS INDEX: 22.05 KG/M2 | TEMPERATURE: 98.7 F | WEIGHT: 112.3 LBS | SYSTOLIC BLOOD PRESSURE: 138 MMHG

## 2024-04-15 DIAGNOSIS — I10 PRIMARY HYPERTENSION: ICD-10-CM

## 2024-04-15 DIAGNOSIS — E07.9 THYROID DISEASE: ICD-10-CM

## 2024-04-15 DIAGNOSIS — H81.10 BENIGN PAROXYSMAL POSITIONAL VERTIGO, UNSPECIFIED LATERALITY: ICD-10-CM

## 2024-04-15 DIAGNOSIS — F43.21 GRIEF REACTION: Primary | ICD-10-CM

## 2024-04-15 DIAGNOSIS — E78.2 MIXED HYPERLIPIDEMIA: ICD-10-CM

## 2024-04-15 DIAGNOSIS — C69.20 MALIGNANT NEOPLASM OF RETINA, UNSPECIFIED LATERALITY (HCC): ICD-10-CM

## 2024-04-15 DIAGNOSIS — M19.90 ARTHRITIS: ICD-10-CM

## 2024-04-15 DIAGNOSIS — M81.0 AGE-RELATED OSTEOPOROSIS WITHOUT CURRENT PATHOLOGICAL FRACTURE: ICD-10-CM

## 2024-04-15 DIAGNOSIS — E53.8 VITAMIN B12 DEFICIENCY (NON ANEMIC): ICD-10-CM

## 2024-04-15 PROCEDURE — G8420 CALC BMI NORM PARAMETERS: HCPCS | Performed by: FAMILY MEDICINE

## 2024-04-15 PROCEDURE — G8400 PT W/DXA NO RESULTS DOC: HCPCS | Performed by: FAMILY MEDICINE

## 2024-04-15 PROCEDURE — 1090F PRES/ABSN URINE INCON ASSESS: CPT | Performed by: FAMILY MEDICINE

## 2024-04-15 PROCEDURE — 3075F SYST BP GE 130 - 139MM HG: CPT | Performed by: FAMILY MEDICINE

## 2024-04-15 PROCEDURE — 1123F ACP DISCUSS/DSCN MKR DOCD: CPT | Performed by: FAMILY MEDICINE

## 2024-04-15 PROCEDURE — 99213 OFFICE O/P EST LOW 20 MIN: CPT | Performed by: FAMILY MEDICINE

## 2024-04-15 PROCEDURE — 3079F DIAST BP 80-89 MM HG: CPT | Performed by: FAMILY MEDICINE

## 2024-04-15 PROCEDURE — 1036F TOBACCO NON-USER: CPT | Performed by: FAMILY MEDICINE

## 2024-04-15 PROCEDURE — G8427 DOCREV CUR MEDS BY ELIG CLIN: HCPCS | Performed by: FAMILY MEDICINE

## 2024-04-15 NOTE — PROGRESS NOTES
4/15/24  Yecenia Haro : 1940 Sex: female  Age: 83 y.o.    Chief Complaint   Patient presents with    Follow-up     1 month follow up       HPI  overall  check up   Still grieving for  and depression       Review of Systems   Constitutional:  Negative for activity change, appetite change, chills, diaphoresis, fatigue, fever and unexpected weight change.        Still greiving  death and difficult to live with son ,very difficult circumstances    HENT:  Positive for postnasal drip (stidrainage posterior through). Negative for congestion, dental problem, drooling, ear discharge, ear pain, facial swelling, hearing loss, mouth sores, nosebleeds, rhinorrhea, sinus pressure, sinus pain, sneezing, sore throat, tinnitus, trouble swallowing and voice change.    Eyes:  Negative for photophobia, pain, discharge, redness, itching and visual disturbance.   Respiratory:  Positive for chest tightness (tenderness from muscles). Negative for apnea, choking, wheezing and stridor. Cough: has waned. Shortness of breath: mild.   Cardiovascular:  Negative for chest pain, palpitations and leg swelling.   Gastrointestinal:  Negative for abdominal distention, abdominal pain, anal bleeding, blood in stool, constipation, diarrhea, nausea, rectal pain and vomiting.   Endocrine: Negative for cold intolerance, heat intolerance, polydipsia, polyphagia and polyuria.   Genitourinary:  Negative for decreased urine volume, difficulty urinating, dysuria, enuresis, flank pain, frequency, genital sores, hematuria and urgency.   Musculoskeletal:  Positive for arthralgias (left knee). Negative for back pain, gait problem, joint swelling, myalgias, neck pain and neck stiffness.        Finger wart    Skin:  Negative for color change, pallor, rash and wound.   Allergic/Immunologic: Negative for environmental allergies, food allergies and immunocompromised state.   Neurological:  Positive for dizziness, weakness and

## 2024-05-13 ENCOUNTER — OFFICE VISIT (OUTPATIENT)
Dept: FAMILY MEDICINE CLINIC | Age: 84
End: 2024-05-13
Payer: MEDICARE

## 2024-05-13 VITALS
WEIGHT: 112.1 LBS | HEIGHT: 60 IN | DIASTOLIC BLOOD PRESSURE: 88 MMHG | OXYGEN SATURATION: 96 % | SYSTOLIC BLOOD PRESSURE: 136 MMHG | BODY MASS INDEX: 22.01 KG/M2 | HEART RATE: 70 BPM | RESPIRATION RATE: 16 BRPM | TEMPERATURE: 98.7 F

## 2024-05-13 DIAGNOSIS — Z00.00 MEDICARE ANNUAL WELLNESS VISIT, SUBSEQUENT: ICD-10-CM

## 2024-05-13 DIAGNOSIS — I10 PRIMARY HYPERTENSION: ICD-10-CM

## 2024-05-13 DIAGNOSIS — F43.21 GRIEF REACTION: Primary | ICD-10-CM

## 2024-05-13 DIAGNOSIS — F41.9 ANXIETY: ICD-10-CM

## 2024-05-13 DIAGNOSIS — F32.A DEPRESSION, UNSPECIFIED DEPRESSION TYPE: ICD-10-CM

## 2024-05-13 DIAGNOSIS — R91.8 PULMONARY NODULES: ICD-10-CM

## 2024-05-13 DIAGNOSIS — H65.92 LEFT NON-SUPPURATIVE OTITIS MEDIA: ICD-10-CM

## 2024-05-13 DIAGNOSIS — H65.91 OTHER NONSUPPURATIVE OTITIS MEDIA OF RIGHT EAR, UNSPECIFIED CHRONICITY: ICD-10-CM

## 2024-05-13 PROCEDURE — 1090F PRES/ABSN URINE INCON ASSESS: CPT | Performed by: FAMILY MEDICINE

## 2024-05-13 PROCEDURE — G8427 DOCREV CUR MEDS BY ELIG CLIN: HCPCS | Performed by: FAMILY MEDICINE

## 2024-05-13 PROCEDURE — 1036F TOBACCO NON-USER: CPT | Performed by: FAMILY MEDICINE

## 2024-05-13 PROCEDURE — 3075F SYST BP GE 130 - 139MM HG: CPT | Performed by: FAMILY MEDICINE

## 2024-05-13 PROCEDURE — G8400 PT W/DXA NO RESULTS DOC: HCPCS | Performed by: FAMILY MEDICINE

## 2024-05-13 PROCEDURE — G8420 CALC BMI NORM PARAMETERS: HCPCS | Performed by: FAMILY MEDICINE

## 2024-05-13 PROCEDURE — 99213 OFFICE O/P EST LOW 20 MIN: CPT | Performed by: FAMILY MEDICINE

## 2024-05-13 PROCEDURE — 3079F DIAST BP 80-89 MM HG: CPT | Performed by: FAMILY MEDICINE

## 2024-05-13 PROCEDURE — 1123F ACP DISCUSS/DSCN MKR DOCD: CPT | Performed by: FAMILY MEDICINE

## 2024-05-13 RX ORDER — LORAZEPAM 0.5 MG/1
TABLET ORAL
Qty: 90 TABLET | Refills: 1 | Status: SHIPPED | OUTPATIENT
Start: 2024-05-13 | End: 2024-06-13
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
## 2024-05-21 ASSESSMENT — ENCOUNTER SYMPTOMS
FACIAL SWELLING: 0
SINUS PRESSURE: 0
ABDOMINAL PAIN: 0
TROUBLE SWALLOWING: 0
EYE REDNESS: 0
CHEST TIGHTNESS: 1
NAUSEA: 0
WHEEZING: 0
SINUS PAIN: 0
CHOKING: 0
VOICE CHANGE: 0
APNEA: 0
ANAL BLEEDING: 0
ABDOMINAL DISTENTION: 0
DIARRHEA: 0
RHINORRHEA: 0
EYE ITCHING: 0
BLOOD IN STOOL: 0
SORE THROAT: 0
BACK PAIN: 0
COLOR CHANGE: 0
RECTAL PAIN: 0
EYE PAIN: 0
CONSTIPATION: 0
VOMITING: 0
PHOTOPHOBIA: 0
EYE DISCHARGE: 0
STRIDOR: 0

## 2024-06-10 ENCOUNTER — OFFICE VISIT (OUTPATIENT)
Dept: FAMILY MEDICINE CLINIC | Age: 84
End: 2024-06-10
Payer: MEDICARE

## 2024-06-10 ENCOUNTER — OFFICE VISIT (OUTPATIENT)
Dept: FAMILY MEDICINE CLINIC | Age: 84
End: 2024-06-10

## 2024-06-10 VITALS
SYSTOLIC BLOOD PRESSURE: 122 MMHG | OXYGEN SATURATION: 97 % | HEIGHT: 60 IN | DIASTOLIC BLOOD PRESSURE: 80 MMHG | WEIGHT: 113.6 LBS | TEMPERATURE: 97.9 F | BODY MASS INDEX: 22.3 KG/M2 | HEART RATE: 77 BPM

## 2024-06-10 VITALS
BODY MASS INDEX: 22.28 KG/M2 | HEIGHT: 60 IN | OXYGEN SATURATION: 97 % | WEIGHT: 113.5 LBS | DIASTOLIC BLOOD PRESSURE: 80 MMHG | SYSTOLIC BLOOD PRESSURE: 122 MMHG | TEMPERATURE: 97.9 F | HEART RATE: 77 BPM

## 2024-06-10 DIAGNOSIS — H81.10 BENIGN PAROXYSMAL POSITIONAL VERTIGO, UNSPECIFIED LATERALITY: ICD-10-CM

## 2024-06-10 DIAGNOSIS — R42 DIZZINESS: ICD-10-CM

## 2024-06-10 DIAGNOSIS — M81.0 AGE-RELATED OSTEOPOROSIS WITHOUT CURRENT PATHOLOGICAL FRACTURE: ICD-10-CM

## 2024-06-10 DIAGNOSIS — Z91.81 AT HIGH RISK FOR FALLS: ICD-10-CM

## 2024-06-10 DIAGNOSIS — Z00.00 MEDICARE ANNUAL WELLNESS VISIT, SUBSEQUENT: Primary | ICD-10-CM

## 2024-06-10 DIAGNOSIS — C69.90: ICD-10-CM

## 2024-06-10 DIAGNOSIS — F41.9 ANXIETY: ICD-10-CM

## 2024-06-10 DIAGNOSIS — E53.8 VITAMIN B12 DEFICIENCY (NON ANEMIC): ICD-10-CM

## 2024-06-10 DIAGNOSIS — E07.9 THYROID DISEASE: ICD-10-CM

## 2024-06-10 DIAGNOSIS — F32.A DEPRESSION, UNSPECIFIED DEPRESSION TYPE: Primary | ICD-10-CM

## 2024-06-10 DIAGNOSIS — E78.2 MIXED HYPERLIPIDEMIA: ICD-10-CM

## 2024-06-10 DIAGNOSIS — I10 PRIMARY HYPERTENSION: ICD-10-CM

## 2024-06-10 DIAGNOSIS — M19.90 ARTHRITIS: ICD-10-CM

## 2024-06-10 PROCEDURE — 1090F PRES/ABSN URINE INCON ASSESS: CPT | Performed by: FAMILY MEDICINE

## 2024-06-10 PROCEDURE — G8420 CALC BMI NORM PARAMETERS: HCPCS | Performed by: FAMILY MEDICINE

## 2024-06-10 PROCEDURE — 1036F TOBACCO NON-USER: CPT | Performed by: FAMILY MEDICINE

## 2024-06-10 PROCEDURE — G8400 PT W/DXA NO RESULTS DOC: HCPCS | Performed by: FAMILY MEDICINE

## 2024-06-10 PROCEDURE — G8427 DOCREV CUR MEDS BY ELIG CLIN: HCPCS | Performed by: FAMILY MEDICINE

## 2024-06-10 PROCEDURE — 99213 OFFICE O/P EST LOW 20 MIN: CPT | Performed by: FAMILY MEDICINE

## 2024-06-10 RX ORDER — MIRTAZAPINE 7.5 MG/1
7.5 TABLET, FILM COATED ORAL NIGHTLY
Qty: 30 TABLET | Refills: 5 | Status: SHIPPED | OUTPATIENT
Start: 2024-06-10

## 2024-06-10 RX ORDER — LORAZEPAM 0.5 MG/1
TABLET ORAL
Qty: 90 TABLET | Refills: 1 | Status: SHIPPED | OUTPATIENT
Start: 2024-06-10 | End: 2024-07-11

## 2024-06-10 ASSESSMENT — PATIENT HEALTH QUESTIONNAIRE - PHQ9
SUM OF ALL RESPONSES TO PHQ QUESTIONS 1-9: 2
7. TROUBLE CONCENTRATING ON THINGS, SUCH AS READING THE NEWSPAPER OR WATCHING TELEVISION: NOT AT ALL
SUM OF ALL RESPONSES TO PHQ QUESTIONS 1-9: 2
10. IF YOU CHECKED OFF ANY PROBLEMS, HOW DIFFICULT HAVE THESE PROBLEMS MADE IT FOR YOU TO DO YOUR WORK, TAKE CARE OF THINGS AT HOME, OR GET ALONG WITH OTHER PEOPLE: SOMEWHAT DIFFICULT
SUM OF ALL RESPONSES TO PHQ QUESTIONS 1-9: 2
SUM OF ALL RESPONSES TO PHQ9 QUESTIONS 1 & 2: 2
8. MOVING OR SPEAKING SO SLOWLY THAT OTHER PEOPLE COULD HAVE NOTICED. OR THE OPPOSITE, BEING SO FIGETY OR RESTLESS THAT YOU HAVE BEEN MOVING AROUND A LOT MORE THAN USUAL: NOT AT ALL
2. FEELING DOWN, DEPRESSED OR HOPELESS: MORE THAN HALF THE DAYS
SUM OF ALL RESPONSES TO PHQ QUESTIONS 1-9: 2
1. LITTLE INTEREST OR PLEASURE IN DOING THINGS: NOT AT ALL
SUM OF ALL RESPONSES TO PHQ QUESTIONS 1-9: 2
SUM OF ALL RESPONSES TO PHQ9 QUESTIONS 1 & 2: 2
3. TROUBLE FALLING OR STAYING ASLEEP: NOT AT ALL
1. LITTLE INTEREST OR PLEASURE IN DOING THINGS: NOT AT ALL
9. THOUGHTS THAT YOU WOULD BE BETTER OFF DEAD, OR OF HURTING YOURSELF: NOT AT ALL
2. FEELING DOWN, DEPRESSED OR HOPELESS: MORE THAN HALF THE DAYS
SUM OF ALL RESPONSES TO PHQ QUESTIONS 1-9: 2
SUM OF ALL RESPONSES TO PHQ QUESTIONS 1-9: 2
4. FEELING TIRED OR HAVING LITTLE ENERGY: NOT AT ALL
5. POOR APPETITE OR OVEREATING: NOT AT ALL
SUM OF ALL RESPONSES TO PHQ QUESTIONS 1-9: 2

## 2024-06-10 NOTE — PROGRESS NOTES
6/10/24  Yecenia Haro : 1940 Sex: female  Age: 83 y.o.    Chief Complaint   Patient presents with    Medication Refill       HPIoverall check up   Still grief reaction           Review of Systems   Constitutional:  Negative for activity change, appetite change, chills, diaphoresis, fatigue, fever and unexpected weight change.        Still greiving  death and difficult to live with son ,very difficult circumstances    HENT:  Positive for postnasal drip (stidrainage posterior through). Negative for congestion, dental problem, drooling, ear discharge, ear pain, facial swelling, hearing loss, mouth sores, nosebleeds, rhinorrhea, sinus pressure, sinus pain, sneezing, sore throat, tinnitus, trouble swallowing and voice change.    Eyes:  Negative for photophobia, pain, discharge, redness, itching and visual disturbance.   Respiratory:  Positive for chest tightness (tenderness from muscles). Negative for apnea, choking, wheezing and stridor. Cough: has waned. Shortness of breath: mild.   Cardiovascular:  Negative for chest pain, palpitations and leg swelling.   Gastrointestinal:  Negative for abdominal distention, abdominal pain, anal bleeding, blood in stool, constipation, diarrhea, nausea, rectal pain and vomiting.   Endocrine: Negative for cold intolerance, heat intolerance, polydipsia, polyphagia and polyuria.   Genitourinary:  Negative for decreased urine volume, difficulty urinating, dysuria, enuresis, flank pain, frequency, genital sores, hematuria and urgency.   Musculoskeletal:  Positive for arthralgias (left knee). Negative for back pain, gait problem, joint swelling, myalgias, neck pain and neck stiffness.        No new neck or back pain   Did not hit head   Was crying a lot but says not hurt      Skin:  Negative for color change, pallor, rash and wound.   Allergic/Immunologic: Negative for environmental allergies, food allergies and immunocompromised state.   Neurological:  Positive for

## 2024-06-13 NOTE — PROGRESS NOTES
Medicare Annual Wellness Visit    Yecenia Haro is here for Medicare AWV    Assessment & Plan   Medicare annual wellness visit, subsequent  Recommendations for Preventive Services Due: see orders and patient instructions/AVS.  Recommended screening schedule for the next 5-10 years is provided to the patient in written form: see Patient Instructions/AVS.     Return in 1 year (on 6/10/2025) for Medicare Annual Wellness Visit in 1 year.     Subjective       Patient's complete Health Risk Assessment and screening values have been reviewed and are found in Flowsheets. The following problems were reviewed today and where indicated follow up appointments were made and/or referrals ordered.    No Positive Risk Factors identified today.                                  Objective   Vitals:    06/10/24 1434   BP: 122/80   Pulse: 77   Temp: 97.9 °F (36.6 °C)   TempSrc: Temporal   SpO2: 97%   Weight: 51.5 kg (113 lb 9.6 oz)   Height: 1.524 m (5')      Body mass index is 22.19 kg/m².               Allergies   Allergen Reactions   • Adhesive Tape Hives   • Pravachol [Pravastatin]    • Toprol Xl [Metoprolol]    • Zithromax [Azithromycin]      Prior to Visit Medications    Medication Sig Taking? Authorizing Provider   LORazepam (ATIVAN) 0.5 MG tablet One tab am and 2 tab pm  Vazquez Little DO   mirtazapine (REMERON) 7.5 MG tablet Take 1 tablet by mouth nightly  Vazquez Little DO   vitamin B-12 (CYANOCOBALAMIN) 500 MCG tablet Take 1 tablet by mouth daily  Vazquez Little DO       CareTeam (Including outside providers/suppliers regularly involved in providing care):   Patient Care Team:  Vazquez Little DO as PCP - General  Vazquez Little DO as PCP - Empaneled Provider     Reviewed and updated this visit:  Tobacco  Meds  Med Hx  Surg Hx  Soc Hx  Fam Hx

## 2024-06-27 ENCOUNTER — TELEPHONE (OUTPATIENT)
Dept: FAMILY MEDICINE CLINIC | Age: 84
End: 2024-06-27

## 2024-06-27 NOTE — TELEPHONE ENCOUNTER
I ABSOLUTELY DO NOT WANT TO WEAN OR TAKE HER OFF THAT MED .SHE HAD SAME PROBLEMS BEFORE MEDS  AND I DO NOT WANT HER TO STOP THAT MED IT IS  NOT CAUSING HER PROBLEMS .KEEP APPT AS YOU HAD

## 2024-06-27 NOTE — TELEPHONE ENCOUNTER
Left message for the patient explaining that Dr. Little would like her to stay on the medication until her appointment when they can further discuss her medications.

## 2024-06-27 NOTE — TELEPHONE ENCOUNTER
Patient stopped in the office with paperwork from her pharmacy about the Lorazepam medication that she has been taking.  She stated that there are several side effects listed on the papers and was concerned about them.  I asked her if she was experiencing any of them and she stated that she occasionally has shallow/slow breathing, dizziness, and unsteadiness.  Patient asked if she could be weaned off the medication.  I told her that I would send you the message, but that you may want to wait until her appointment on 7/8/24 to further discuss weaning off of the medication.

## 2024-07-08 ENCOUNTER — OFFICE VISIT (OUTPATIENT)
Dept: FAMILY MEDICINE CLINIC | Age: 84
End: 2024-07-08
Payer: MEDICARE

## 2024-07-08 VITALS
TEMPERATURE: 97.4 F | OXYGEN SATURATION: 98 % | HEIGHT: 60 IN | HEART RATE: 99 BPM | WEIGHT: 107 LBS | RESPIRATION RATE: 16 BRPM | BODY MASS INDEX: 21.01 KG/M2 | DIASTOLIC BLOOD PRESSURE: 78 MMHG | SYSTOLIC BLOOD PRESSURE: 122 MMHG

## 2024-07-08 DIAGNOSIS — E53.8 VITAMIN B12 DEFICIENCY (NON ANEMIC): ICD-10-CM

## 2024-07-08 DIAGNOSIS — E78.2 MIXED HYPERLIPIDEMIA: ICD-10-CM

## 2024-07-08 DIAGNOSIS — I10 PRIMARY HYPERTENSION: ICD-10-CM

## 2024-07-08 DIAGNOSIS — R42 DIZZINESS: ICD-10-CM

## 2024-07-08 DIAGNOSIS — E07.9 THYROID DISEASE: ICD-10-CM

## 2024-07-08 DIAGNOSIS — H81.10 BENIGN PAROXYSMAL POSITIONAL VERTIGO, UNSPECIFIED LATERALITY: Primary | ICD-10-CM

## 2024-07-08 DIAGNOSIS — E83.42 HYPOMAGNESEMIA: ICD-10-CM

## 2024-07-08 DIAGNOSIS — F41.9 ANXIETY: ICD-10-CM

## 2024-07-08 DIAGNOSIS — F32.A DEPRESSION, UNSPECIFIED DEPRESSION TYPE: ICD-10-CM

## 2024-07-08 DIAGNOSIS — F43.21 GRIEF REACTION: ICD-10-CM

## 2024-07-08 DIAGNOSIS — C80.1 CANCER (HCC): ICD-10-CM

## 2024-07-08 PROCEDURE — 99213 OFFICE O/P EST LOW 20 MIN: CPT | Performed by: FAMILY MEDICINE

## 2024-07-08 PROCEDURE — G8420 CALC BMI NORM PARAMETERS: HCPCS | Performed by: FAMILY MEDICINE

## 2024-07-08 PROCEDURE — 1090F PRES/ABSN URINE INCON ASSESS: CPT | Performed by: FAMILY MEDICINE

## 2024-07-08 PROCEDURE — 1123F ACP DISCUSS/DSCN MKR DOCD: CPT | Performed by: FAMILY MEDICINE

## 2024-07-08 PROCEDURE — 1036F TOBACCO NON-USER: CPT | Performed by: FAMILY MEDICINE

## 2024-07-08 PROCEDURE — G8400 PT W/DXA NO RESULTS DOC: HCPCS | Performed by: FAMILY MEDICINE

## 2024-07-08 PROCEDURE — 3078F DIAST BP <80 MM HG: CPT | Performed by: FAMILY MEDICINE

## 2024-07-08 PROCEDURE — G8427 DOCREV CUR MEDS BY ELIG CLIN: HCPCS | Performed by: FAMILY MEDICINE

## 2024-07-08 PROCEDURE — 3074F SYST BP LT 130 MM HG: CPT | Performed by: FAMILY MEDICINE

## 2024-07-08 NOTE — PROGRESS NOTES
24  Yecenia Haro : 1940 Sex: female  Age: 83 y.o.    Chief Complaint   Patient presents with    Follow-up     1 month follow up, discuss medication       HPI  overall check up   Not feeling well with anxiety and with depression   Occasional dizziness        Review of Systems   Constitutional:  Negative for activity change, appetite change, chills, diaphoresis, fatigue, fever and unexpected weight change.        Still greiving  death and difficult to live with son ,very difficult circumstances    HENT:  Positive for postnasal drip (stidrainage posterior through). Negative for congestion, dental problem, drooling, ear discharge, ear pain, facial swelling, hearing loss, mouth sores, nosebleeds, rhinorrhea, sinus pressure, sinus pain, sneezing, sore throat, tinnitus, trouble swallowing and voice change.    Eyes:  Negative for photophobia, pain, discharge, redness, itching and visual disturbance.   Respiratory:  Positive for chest tightness (tenderness from muscles). Negative for apnea, choking, wheezing and stridor. Cough: has waned. Shortness of breath: mild.   Cardiovascular:  Negative for chest pain, palpitations and leg swelling.   Gastrointestinal:  Negative for abdominal distention, abdominal pain, anal bleeding, blood in stool, constipation, diarrhea, nausea, rectal pain and vomiting.   Endocrine: Negative for cold intolerance, heat intolerance, polydipsia, polyphagia and polyuria.   Genitourinary:  Negative for decreased urine volume, difficulty urinating, dysuria, enuresis, flank pain, frequency, genital sores, hematuria and urgency.   Musculoskeletal:  Positive for arthralgias (left knee). Negative for back pain, gait problem, joint swelling, myalgias, neck pain and neck stiffness.        No new neck or back pain   Did not hit head   Was crying a lot but says not hurt      Skin:  Negative for color change, pallor, rash and wound.   Allergic/Immunologic: Negative for environmental

## 2024-07-10 ENCOUNTER — LAB (OUTPATIENT)
Dept: FAMILY MEDICINE CLINIC | Age: 84
End: 2024-07-10
Payer: MEDICARE

## 2024-07-10 DIAGNOSIS — I10 PRIMARY HYPERTENSION: ICD-10-CM

## 2024-07-10 DIAGNOSIS — E83.42 HYPOMAGNESEMIA: ICD-10-CM

## 2024-07-10 DIAGNOSIS — E78.2 MIXED HYPERLIPIDEMIA: ICD-10-CM

## 2024-07-10 DIAGNOSIS — R26.0 ATAXIC GAIT: ICD-10-CM

## 2024-07-10 DIAGNOSIS — E53.8 VITAMIN B12 DEFICIENCY (NON ANEMIC): ICD-10-CM

## 2024-07-10 DIAGNOSIS — R42 DIZZINESS: ICD-10-CM

## 2024-07-10 DIAGNOSIS — H81.10 BENIGN PAROXYSMAL POSITIONAL VERTIGO, UNSPECIFIED LATERALITY: ICD-10-CM

## 2024-07-10 DIAGNOSIS — E07.9 THYROID DISEASE: ICD-10-CM

## 2024-07-10 DIAGNOSIS — C80.1 CANCER (HCC): ICD-10-CM

## 2024-07-10 DIAGNOSIS — F41.9 ANXIETY: ICD-10-CM

## 2024-07-10 DIAGNOSIS — F43.21 GRIEF REACTION: ICD-10-CM

## 2024-07-10 LAB
ALBUMIN: 4.1 G/DL (ref 3.5–5.2)
ALP BLD-CCNC: 58 U/L (ref 35–104)
ALT SERPL-CCNC: 6 U/L (ref 0–32)
ANION GAP SERPL CALCULATED.3IONS-SCNC: 11 MMOL/L (ref 7–16)
AST SERPL-CCNC: 15 U/L (ref 0–31)
BASOPHILS ABSOLUTE: 0.04 K/UL (ref 0–0.2)
BASOPHILS RELATIVE PERCENT: 1 % (ref 0–2)
BILIRUB SERPL-MCNC: 0.4 MG/DL (ref 0–1.2)
BUN BLDV-MCNC: 9 MG/DL (ref 6–23)
CALCIUM SERPL-MCNC: 9.5 MG/DL (ref 8.6–10.2)
CHLORIDE BLD-SCNC: 107 MMOL/L (ref 98–107)
CHOLESTEROL, TOTAL: 236 MG/DL
CO2: 26 MMOL/L (ref 22–29)
CREAT SERPL-MCNC: 0.7 MG/DL (ref 0.5–1)
EOSINOPHILS ABSOLUTE: 0.06 K/UL (ref 0.05–0.5)
EOSINOPHILS RELATIVE PERCENT: 1 % (ref 0–6)
FOLATE: 6.8 NG/ML (ref 4.8–24.2)
GFR, ESTIMATED: 82 ML/MIN/1.73M2
GLUCOSE BLD-MCNC: 100 MG/DL (ref 74–99)
HCT VFR BLD CALC: 43 % (ref 34–48)
HDLC SERPL-MCNC: 50 MG/DL
HEMOGLOBIN: 13.6 G/DL (ref 11.5–15.5)
IMMATURE GRANULOCYTES %: 1 % (ref 0–5)
IMMATURE GRANULOCYTES ABSOLUTE: 0.03 K/UL (ref 0–0.58)
LDL CHOLESTEROL: 170 MG/DL
LYMPHOCYTES ABSOLUTE: 2.09 K/UL (ref 1.5–4)
LYMPHOCYTES RELATIVE PERCENT: 32 % (ref 20–42)
MCH RBC QN AUTO: 30.1 PG (ref 26–35)
MCHC RBC AUTO-ENTMCNC: 31.6 G/DL (ref 32–34.5)
MCV RBC AUTO: 95.1 FL (ref 80–99.9)
MONOCYTES ABSOLUTE: 0.52 K/UL (ref 0.1–0.95)
MONOCYTES RELATIVE PERCENT: 8 % (ref 2–12)
NEUTROPHILS ABSOLUTE: 3.76 K/UL (ref 1.8–7.3)
NEUTROPHILS RELATIVE PERCENT: 58 % (ref 43–80)
PDW BLD-RTO: 14.1 % (ref 11.5–15)
PLATELET # BLD: 270 K/UL (ref 130–450)
PMV BLD AUTO: 10 FL (ref 7–12)
POTASSIUM SERPL-SCNC: 5.5 MMOL/L (ref 3.5–5)
RBC # BLD: 4.52 M/UL (ref 3.5–5.5)
SODIUM BLD-SCNC: 144 MMOL/L (ref 132–146)
T4 FREE: 1.1 NG/DL (ref 0.9–1.7)
TOTAL PROTEIN: 7.4 G/DL (ref 6.4–8.3)
TRIGL SERPL-MCNC: 80 MG/DL
TSH SERPL DL<=0.05 MIU/L-ACNC: 2.92 UIU/ML (ref 0.27–4.2)
VITAMIN B-12: 371 PG/ML (ref 211–946)
VLDLC SERPL CALC-MCNC: 16 MG/DL
WBC # BLD: 6.5 K/UL (ref 4.5–11.5)

## 2024-07-10 PROCEDURE — 36415 COLL VENOUS BLD VENIPUNCTURE: CPT | Performed by: FAMILY MEDICINE

## 2024-08-06 ENCOUNTER — APPOINTMENT (OUTPATIENT)
Dept: GENERAL RADIOLOGY | Age: 84
End: 2024-08-06
Payer: MEDICARE

## 2024-08-06 ENCOUNTER — HOSPITAL ENCOUNTER (OUTPATIENT)
Age: 84
Setting detail: OBSERVATION
LOS: 1 days | Discharge: HOME | End: 2024-08-07
Attending: STUDENT IN AN ORGANIZED HEALTH CARE EDUCATION/TRAINING PROGRAM | Admitting: INTERNAL MEDICINE
Payer: MEDICARE

## 2024-08-06 ENCOUNTER — APPOINTMENT (OUTPATIENT)
Dept: CT IMAGING | Age: 84
End: 2024-08-06
Payer: MEDICARE

## 2024-08-06 DIAGNOSIS — G45.9 TIA (TRANSIENT ISCHEMIC ATTACK): Primary | ICD-10-CM

## 2024-08-06 LAB
ALBUMIN SERPL-MCNC: 4.4 G/DL (ref 3.5–5.2)
ALP SERPL-CCNC: 73 U/L (ref 35–104)
ALT SERPL-CCNC: 8 U/L (ref 0–32)
ANION GAP SERPL CALCULATED.3IONS-SCNC: 13 MMOL/L (ref 7–16)
AST SERPL-CCNC: 18 U/L (ref 0–31)
BASOPHILS # BLD: 0.05 K/UL (ref 0–0.2)
BASOPHILS NFR BLD: 1 % (ref 0–2)
BILIRUB SERPL-MCNC: 0.6 MG/DL (ref 0–1.2)
BUN SERPL-MCNC: 8 MG/DL (ref 6–23)
CALCIUM SERPL-MCNC: 9.4 MG/DL (ref 8.6–10.2)
CHLORIDE SERPL-SCNC: 102 MMOL/L (ref 98–107)
CO2 SERPL-SCNC: 23 MMOL/L (ref 22–29)
CREAT SERPL-MCNC: 0.6 MG/DL (ref 0.5–1)
EOSINOPHIL # BLD: 0.04 K/UL (ref 0.05–0.5)
EOSINOPHILS RELATIVE PERCENT: 1 % (ref 0–6)
ERYTHROCYTE [DISTWIDTH] IN BLOOD BY AUTOMATED COUNT: 13.6 % (ref 11.5–15)
GFR, ESTIMATED: 88 ML/MIN/1.73M2
GLUCOSE BLD-MCNC: 84 MG/DL (ref 74–99)
GLUCOSE BLD-MCNC: 85 MG/DL (ref 74–99)
GLUCOSE SERPL-MCNC: 94 MG/DL (ref 74–99)
HCT VFR BLD AUTO: 43.9 % (ref 34–48)
HGB BLD-MCNC: 13.9 G/DL (ref 11.5–15.5)
IMM GRANULOCYTES # BLD AUTO: 0.03 K/UL (ref 0–0.58)
IMM GRANULOCYTES NFR BLD: 0 % (ref 0–5)
INFLUENZA A BY PCR: NOT DETECTED
INFLUENZA B BY PCR: NOT DETECTED
LACTATE BLDV-SCNC: 1.1 MMOL/L (ref 0.5–1.9)
LIPASE SERPL-CCNC: 25 U/L (ref 13–60)
LYMPHOCYTES NFR BLD: 2.7 K/UL (ref 1.5–4)
LYMPHOCYTES RELATIVE PERCENT: 31 % (ref 20–42)
MCH RBC QN AUTO: 28.7 PG (ref 26–35)
MCHC RBC AUTO-ENTMCNC: 31.7 G/DL (ref 32–34.5)
MCV RBC AUTO: 90.7 FL (ref 80–99.9)
MONOCYTES NFR BLD: 0.54 K/UL (ref 0.1–0.95)
MONOCYTES NFR BLD: 6 % (ref 2–12)
NEUTROPHILS NFR BLD: 61 % (ref 43–80)
NEUTS SEG NFR BLD: 5.35 K/UL (ref 1.8–7.3)
PLATELET # BLD AUTO: 313 K/UL (ref 130–450)
PMV BLD AUTO: 9.3 FL (ref 7–12)
POTASSIUM SERPL-SCNC: 4 MMOL/L (ref 3.5–5)
PROT SERPL-MCNC: 8.3 G/DL (ref 6.4–8.3)
RBC # BLD AUTO: 4.84 M/UL (ref 3.5–5.5)
RSV BY PCR: NOT DETECTED
SARS-COV-2 RDRP RESP QL NAA+PROBE: NOT DETECTED
SODIUM SERPL-SCNC: 138 MMOL/L (ref 132–146)
SPECIMEN DESCRIPTION: NORMAL
SPECIMEN SOURCE: NORMAL
T4 FREE SERPL-MCNC: 1.2 NG/DL (ref 0.9–1.7)
TROPONIN I SERPL HS-MCNC: 7 NG/L (ref 0–9)
TSH SERPL DL<=0.05 MIU/L-ACNC: 4.71 UIU/ML (ref 0.27–4.2)
WBC OTHER # BLD: 8.7 K/UL (ref 4.5–11.5)

## 2024-08-06 PROCEDURE — 71046 X-RAY EXAM CHEST 2 VIEWS: CPT

## 2024-08-06 PROCEDURE — 84439 ASSAY OF FREE THYROXINE: CPT

## 2024-08-06 PROCEDURE — 87634 RSV DNA/RNA AMP PROBE: CPT

## 2024-08-06 PROCEDURE — 87040 BLOOD CULTURE FOR BACTERIA: CPT

## 2024-08-06 PROCEDURE — 84484 ASSAY OF TROPONIN QUANT: CPT

## 2024-08-06 PROCEDURE — 99285 EMERGENCY DEPT VISIT HI MDM: CPT

## 2024-08-06 PROCEDURE — 87502 INFLUENZA DNA AMP PROBE: CPT

## 2024-08-06 PROCEDURE — 83690 ASSAY OF LIPASE: CPT

## 2024-08-06 PROCEDURE — 70450 CT HEAD/BRAIN W/O DYE: CPT

## 2024-08-06 PROCEDURE — 87635 SARS-COV-2 COVID-19 AMP PRB: CPT

## 2024-08-06 PROCEDURE — 80053 COMPREHEN METABOLIC PANEL: CPT

## 2024-08-06 PROCEDURE — 82962 GLUCOSE BLOOD TEST: CPT

## 2024-08-06 PROCEDURE — 85025 COMPLETE CBC W/AUTO DIFF WBC: CPT

## 2024-08-06 PROCEDURE — 83605 ASSAY OF LACTIC ACID: CPT

## 2024-08-06 PROCEDURE — 93005 ELECTROCARDIOGRAM TRACING: CPT | Performed by: PHYSICIAN ASSISTANT

## 2024-08-06 PROCEDURE — 84443 ASSAY THYROID STIM HORMONE: CPT

## 2024-08-06 RX ORDER — ACETAMINOPHEN 325 MG/1
650 TABLET ORAL ONCE
Status: DISCONTINUED | OUTPATIENT
Start: 2024-08-06 | End: 2024-08-07 | Stop reason: HOSPADM

## 2024-08-06 ASSESSMENT — LIFESTYLE VARIABLES
HOW OFTEN DO YOU HAVE A DRINK CONTAINING ALCOHOL: NEVER
HOW MANY STANDARD DRINKS CONTAINING ALCOHOL DO YOU HAVE ON A TYPICAL DAY: PATIENT DOES NOT DRINK

## 2024-08-06 ASSESSMENT — PAIN - FUNCTIONAL ASSESSMENT: PAIN_FUNCTIONAL_ASSESSMENT: NONE - DENIES PAIN

## 2024-08-06 ASSESSMENT — ENCOUNTER SYMPTOMS: SHORTNESS OF BREATH: 0

## 2024-08-06 NOTE — ED TRIAGE NOTES
Department of Emergency Medicine    FIRST PROVIDER TRIAGE NOTE             Independent MLP           8/6/24  7:10 PM EDT    Date of Encounter: 8/6/24   MRN: [id number]    Vitals:    08/06/24 1908   BP: (!) 140/82   Pulse: 74   Resp: 16   Temp: 97.7 °F (36.5 °C)   TempSrc: Oral   SpO2: 98%   Weight: 50.8 kg (112 lb)   Height: 1.651 m (5' 5\")      HPI: Yecenia Haro is a 83 y.o. female who presents to the ED for Altered Mental Status (For the past two hours, sent in by urgent care), Extremity Weakness, and Dizziness     ROS: Negative for cp, sob, fever, or cough.    Physical Exam:   Gen Appearance/Constitutional: alert  CV: regular rate     Initial Plan of Care: All treatment areas with department are currently occupied.     Plan to order/Initiate the following while awaiting opening in ED: Triage evaluation  labs, EKG, and imaging studies.    Provider-Patient relationship only established for Provider In Triage (PIT).  Full assessment, HPI and examination not performed, therefore, it is not yet possible to state whether or not an emergency medical condition exists    Initial Plan of Care: Initiate Treatment-Testing, Proceed toTreatment Area When Bed Available for ED Attending/MLP to Continue Care  Secondary to high volume, low staffing, and/or boarding- patient to await bed availability.    Charge RN called and made aware of patients need for room. Advised to have triage RN start protocol orders while in triage and patient will be next to come back.     This ends my PIT-Patient relationship.  Care of patient relinquished after triage    Electronically signed by Antionette Perez PA-C   DD: 8/6/24

## 2024-08-07 ENCOUNTER — APPOINTMENT (OUTPATIENT)
Dept: MRI IMAGING | Age: 84
End: 2024-08-07
Payer: MEDICARE

## 2024-08-07 ENCOUNTER — APPOINTMENT (OUTPATIENT)
Dept: ULTRASOUND IMAGING | Age: 84
End: 2024-08-07
Payer: MEDICARE

## 2024-08-07 VITALS
HEIGHT: 65 IN | TEMPERATURE: 97.8 F | WEIGHT: 112 LBS | OXYGEN SATURATION: 97 % | RESPIRATION RATE: 18 BRPM | HEART RATE: 68 BPM | BODY MASS INDEX: 18.66 KG/M2 | SYSTOLIC BLOOD PRESSURE: 106 MMHG | DIASTOLIC BLOOD PRESSURE: 68 MMHG

## 2024-08-07 PROBLEM — G45.9 TIA (TRANSIENT ISCHEMIC ATTACK): Status: ACTIVE | Noted: 2024-08-07

## 2024-08-07 LAB
ANION GAP SERPL CALCULATED.3IONS-SCNC: 8 MMOL/L (ref 7–16)
BUN SERPL-MCNC: 8 MG/DL (ref 6–23)
CALCIUM SERPL-MCNC: 8.3 MG/DL (ref 8.6–10.2)
CHLORIDE SERPL-SCNC: 102 MMOL/L (ref 98–107)
CO2 SERPL-SCNC: 25 MMOL/L (ref 22–29)
CREAT SERPL-MCNC: 0.7 MG/DL (ref 0.5–1)
GFR, ESTIMATED: 87 ML/MIN/1.73M2
GLUCOSE SERPL-MCNC: 96 MG/DL (ref 74–99)
POTASSIUM SERPL-SCNC: 4 MMOL/L (ref 3.5–5)
SODIUM SERPL-SCNC: 135 MMOL/L (ref 132–146)

## 2024-08-07 PROCEDURE — 70553 MRI BRAIN STEM W/O & W/DYE: CPT

## 2024-08-07 PROCEDURE — 92507 TX SP LANG VOICE COMM INDIV: CPT

## 2024-08-07 PROCEDURE — 93880 EXTRACRANIAL BILAT STUDY: CPT

## 2024-08-07 PROCEDURE — G0378 HOSPITAL OBSERVATION PER HR: HCPCS

## 2024-08-07 PROCEDURE — 80048 BASIC METABOLIC PNL TOTAL CA: CPT

## 2024-08-07 PROCEDURE — 6370000000 HC RX 637 (ALT 250 FOR IP): Performed by: STUDENT IN AN ORGANIZED HEALTH CARE EDUCATION/TRAINING PROGRAM

## 2024-08-07 PROCEDURE — 6360000004 HC RX CONTRAST MEDICATION: Performed by: RADIOLOGY

## 2024-08-07 PROCEDURE — 87086 URINE CULTURE/COLONY COUNT: CPT

## 2024-08-07 PROCEDURE — 99239 HOSP IP/OBS DSCHRG MGMT >30: CPT | Performed by: STUDENT IN AN ORGANIZED HEALTH CARE EDUCATION/TRAINING PROGRAM

## 2024-08-07 PROCEDURE — 6370000000 HC RX 637 (ALT 250 FOR IP): Performed by: INTERNAL MEDICINE

## 2024-08-07 PROCEDURE — 92523 SPEECH SOUND LANG COMPREHEN: CPT

## 2024-08-07 PROCEDURE — A9577 INJ MULTIHANCE: HCPCS | Performed by: RADIOLOGY

## 2024-08-07 PROCEDURE — 2580000003 HC RX 258: Performed by: INTERNAL MEDICINE

## 2024-08-07 RX ORDER — SODIUM CHLORIDE 0.9 % (FLUSH) 0.9 %
5-40 SYRINGE (ML) INJECTION PRN
Status: DISCONTINUED | OUTPATIENT
Start: 2024-08-07 | End: 2024-08-07 | Stop reason: HOSPADM

## 2024-08-07 RX ORDER — POLYETHYLENE GLYCOL 3350 17 G/17G
17 POWDER, FOR SOLUTION ORAL DAILY PRN
Status: DISCONTINUED | OUTPATIENT
Start: 2024-08-07 | End: 2024-08-07 | Stop reason: HOSPADM

## 2024-08-07 RX ORDER — MAGNESIUM SULFATE IN WATER 40 MG/ML
2000 INJECTION, SOLUTION INTRAVENOUS PRN
Status: DISCONTINUED | OUTPATIENT
Start: 2024-08-07 | End: 2024-08-07 | Stop reason: HOSPADM

## 2024-08-07 RX ORDER — ACETAMINOPHEN 650 MG/1
650 SUPPOSITORY RECTAL EVERY 6 HOURS PRN
Status: DISCONTINUED | OUTPATIENT
Start: 2024-08-07 | End: 2024-08-07 | Stop reason: HOSPADM

## 2024-08-07 RX ORDER — ONDANSETRON 2 MG/ML
4 INJECTION INTRAMUSCULAR; INTRAVENOUS EVERY 6 HOURS PRN
Status: DISCONTINUED | OUTPATIENT
Start: 2024-08-07 | End: 2024-08-07 | Stop reason: HOSPADM

## 2024-08-07 RX ORDER — LORAZEPAM 0.5 MG/1
0.5 TABLET ORAL 2 TIMES DAILY PRN
Status: DISCONTINUED | OUTPATIENT
Start: 2024-08-07 | End: 2024-08-07 | Stop reason: HOSPADM

## 2024-08-07 RX ORDER — ASPIRIN 325 MG
325 TABLET ORAL ONCE
Status: COMPLETED | OUTPATIENT
Start: 2024-08-07 | End: 2024-08-07

## 2024-08-07 RX ORDER — POTASSIUM CHLORIDE 20 MEQ/1
40 TABLET, EXTENDED RELEASE ORAL PRN
Status: DISCONTINUED | OUTPATIENT
Start: 2024-08-07 | End: 2024-08-07 | Stop reason: HOSPADM

## 2024-08-07 RX ORDER — ACETAMINOPHEN 325 MG/1
650 TABLET ORAL EVERY 6 HOURS PRN
Status: DISCONTINUED | OUTPATIENT
Start: 2024-08-07 | End: 2024-08-07 | Stop reason: HOSPADM

## 2024-08-07 RX ORDER — LORAZEPAM 0.5 MG/1
0.5 TABLET ORAL 2 TIMES DAILY PRN
Status: DISCONTINUED | OUTPATIENT
Start: 2024-08-07 | End: 2024-08-07 | Stop reason: SDUPTHER

## 2024-08-07 RX ORDER — MIRTAZAPINE 15 MG/1
7.5 TABLET, FILM COATED ORAL NIGHTLY
Status: CANCELLED | OUTPATIENT
Start: 2024-08-07

## 2024-08-07 RX ORDER — LORAZEPAM 0.5 MG/1
0.5 TABLET ORAL 2 TIMES DAILY PRN
COMMUNITY
End: 2024-08-09 | Stop reason: SDUPTHER

## 2024-08-07 RX ORDER — ASPIRIN 300 MG/1
300 SUPPOSITORY RECTAL ONCE
Status: COMPLETED | OUTPATIENT
Start: 2024-08-07 | End: 2024-08-07

## 2024-08-07 RX ORDER — POTASSIUM CHLORIDE 7.45 MG/ML
10 INJECTION INTRAVENOUS PRN
Status: DISCONTINUED | OUTPATIENT
Start: 2024-08-07 | End: 2024-08-07 | Stop reason: HOSPADM

## 2024-08-07 RX ORDER — SODIUM CHLORIDE 9 MG/ML
INJECTION, SOLUTION INTRAVENOUS PRN
Status: DISCONTINUED | OUTPATIENT
Start: 2024-08-07 | End: 2024-08-07 | Stop reason: HOSPADM

## 2024-08-07 RX ORDER — ENOXAPARIN SODIUM 100 MG/ML
30 INJECTION SUBCUTANEOUS DAILY
Status: DISCONTINUED | OUTPATIENT
Start: 2024-08-07 | End: 2024-08-07 | Stop reason: HOSPADM

## 2024-08-07 RX ORDER — SODIUM CHLORIDE 0.9 % (FLUSH) 0.9 %
5-40 SYRINGE (ML) INJECTION EVERY 12 HOURS SCHEDULED
Status: DISCONTINUED | OUTPATIENT
Start: 2024-08-07 | End: 2024-08-07 | Stop reason: HOSPADM

## 2024-08-07 RX ORDER — ONDANSETRON 4 MG/1
4 TABLET, ORALLY DISINTEGRATING ORAL EVERY 8 HOURS PRN
Status: DISCONTINUED | OUTPATIENT
Start: 2024-08-07 | End: 2024-08-07 | Stop reason: HOSPADM

## 2024-08-07 RX ADMIN — LORAZEPAM 0.5 MG: 0.5 TABLET ORAL at 13:19

## 2024-08-07 RX ADMIN — SODIUM CHLORIDE, PRESERVATIVE FREE 10 ML: 5 INJECTION INTRAVENOUS at 08:53

## 2024-08-07 RX ADMIN — GADOBENATE DIMEGLUMINE 10 ML: 529 INJECTION, SOLUTION INTRAVENOUS at 06:48

## 2024-08-07 RX ADMIN — LORAZEPAM 0.5 MG: 0.5 TABLET ORAL at 03:21

## 2024-08-07 RX ADMIN — ASPIRIN 325 MG: 325 TABLET ORAL at 02:54

## 2024-08-07 NOTE — DISCHARGE SUMMARY
[facility] Hospitalist Physician Discharge Summary       No follow-up provider specified.    Activity level: as tolerated    Dispo: home      Condition on discharge: stable    Patient ID:  Yecenia Haro  [id number]  83 y.o.  1940    Admit date: 8/6/2024    Discharge date and time:  8/7/2024  3:03 PM    Admission Diagnoses: Principal Problem:    TIA (transient ischemic attack)  Resolved Problems:    * No resolved hospital problems. *      Discharge Diagnoses: Principal Problem:    TIA (transient ischemic attack)  Resolved Problems:    * No resolved hospital problems. *      Consults:  IP CONSULT TO INTERNAL MEDICINE    Procedures: None    Hospital Course:   Patient Yecenia Haro is a 83 y.o. presented with TIA (transient ischemic attack) [G45.9]    Brief summary:  Patient presented for generalized weakness. Initially some c/f TIA however story provided by patient and family seems more c/w anxiety/panic attack (not really \"slurred\" speech but repetitive and anxious/rapid, no focal weakness rather generalized loss of motivation/feeling overwhelmed, symptoms precipitated at time of severe weather yesterday and has had similar episodes in the past). Exam is nonfocal. MRI brain and CTH w/o acute intracranial finding. Vasc US carotids with 0-50% stenosis bilaterally. Statin intolerant. Patient is hesitant to initiate any new medications, at this time using shared decision-making with patient will defer starting DAPT (bASA, Plavix), she wishes to discuss this further with her PCP at upcoming appt on 8/9.    Patient otherwise remained stable during admission. Other issues addressed as below. Patient is being discharged home in stable condition.    **Most recent hospitalist plan**  A/P  TIA r/o  Generalized weakness  Presenting decreased appetite, slurred speech, headache, and confusion are now resolved, though patient still with generalized weakness  Nonfocal neuro exam  EKG sinus rhythm no acute infarct,

## 2024-08-07 NOTE — PROGRESS NOTES
SPEECH/LANGUAGE PATHOLOGY  SPEECH/LANGUAGE/COGNITIVE EVALUATION   and PLAN OF CARE      PATIENT NAME:  Yecenia Haro  (female)     MRN:  [id number]    :  1940  (83 y.o.)  STATUS:  Inpatient: Room     TODAY'S DATE:  2024  REFERRING PROVIDER:    Dr. Rebolledo   SPECIFIC PROVIDER ORDER: speech language pathology (SLP) eval and treat  Date of order:  24  REASON FOR REFERRAL: Assess speech/language/cognition   EVALUATING THERAPIST: DORA Lr    ADMITTING DIAGNOSIS: TIA (transient ischemic attack) [G45.9]    VISIT DIAGNOSIS:      SPEECH THERAPY  PLAN OF CARE   The speech therapy  POC is established based on physician order, speech pathology diagnosis and results of clinical assessment     SPEECH PATHOLOGY DIAGNOSIS:    Within function limits for age and premorbid functioning    Per patient's son at the bedside, patient's speech/language/cognition has returned to baseline.     Speech Pathology intervention is not warranted at this time.     Conditions Requiring Skilled Therapeutic Intervention for speech, language and/or cognition  Not applicable     Specific Speech Therapy Interventions to Include:   Not applicable    Specific instructions for next treatment:    Not applicable    SHORT/LONG TERM GOALS  Not applicable      Patient goals: Patient/family involved in developing goals and treatment plan:   Treatment goals discussed with Patient and Family    The Patient and Family understand(s) the diagnosis, prognosis and plan of care   The patient/family Agreed with above,     This plan may be re-evaluated and revised as warranted.        Rehabilitation Potential/Prognosis: not applicable                CLINICAL ASSESSMENT:  MOTOR SPEECH       Oral Peripheral Examination   Adequate lingual/labial strength     Parameters of Speech Production  Respiration:  Adequate for speech production  Articulation:  Within functional limits  Resonance:  Within functional limits  Quality:   Within functional  limits  Pitch:    Within functional limits  Intensity: Within functional limits  Fluency:  Intact  Prosody Intact     Speech Intelligibility      Good given unstructured task and unknown context     RECEPTIVE LANGUAGE    Comprehension of Yes/No Questions:   Within functional limits    Process  Simple Verbal Commands:   Within functional limits  Process Intermediate Verbal Commands:   Within functional limits  Process Complex Verbal Commands:     Within functional limits    Comprehension of Conversation:      Within functional limits      EXPRESSIVE LANGUAGE     Serials: Functional    Imitation:  Words   Functional   Sentences Functional    Naming:  (Modality used:  Verbal)  Confrontation Naming  Functional  Functional Description  Functional  Response Naming: Functional    Conversation:      Conversation was within functional limits    COGNITION     Attention/Orientation  Attention: Sustained attention   Orientation:  Oriented to Person, Place, Date, Reason for hospitalization    Memory   Immediate Recall: Repeated 3/3    Delayed Recall:   Recalled  1/3  Long Term Recall:   Recalled Address, Birthdate, Age, and Family    Organization/Problem Solving/Reasoning   Verbal Sequencing:   Patient demonstrated some difficulty with sequencing tasks. However, per patient's son at bedside, this is patient's baseline cognitive status       Verbal Problem solving:   Patient demonstrated some difficulty with verbal problem solving tasks. However, per patient's son at bedside, this is patient's baseline cognitive status         CLINICAL OBSERVATIONS NOTED DURING THE EVALUATION  Patient demonstrated some difficulty with cognitive tasks. However, per patient's son at bedside, this is patient's baseline cognitive status                  EDUCATION:   The Speech Language Pathologist (SLP) completed education regarding results of evaluation and that intervention is not warranted at this time.  Learner: Patient and Family  Education:

## 2024-08-07 NOTE — ED PROVIDER NOTES
[facility]  Department of Emergency Medicine   EM Physician Yecenia Escalante 83 y.o. female PMHx of HLD, HTN, Cancer, Anxiety presents to the ED c/o decreased appetite, weakness, light headed feeling. Onset:past 2hours. Location/Radiation:generalized sensation. Duration: persistent and constant since symptoms started. Characterization: weak, tired, light headed feeling. Aggravating Factors: humidity, weather. Relieving Factors: none, started to resolve on its own. Severity: moderate. Son reports patient was stumbing over her words.Patient's son lives with patient. He reports that she was stumbing over her words and had abnormal gait that resolved before arrival to [facility].       Assx Sxs: headache, stumbing words, gait abnormality.              Review of Systems   Constitutional:  Positive for activity change, appetite change and fatigue.   Respiratory:  Negative for shortness of breath.    Cardiovascular:  Negative for chest pain.   Neurological:  Positive for weakness.        Physical Exam  Constitutional:       General: She is not in acute distress.     Appearance: Normal appearance. She is not ill-appearing or toxic-appearing.   HENT:      Head: Normocephalic and atraumatic.      Right Ear: External ear normal.      Left Ear: External ear normal.      Nose: Nose normal.      Mouth/Throat:      Mouth: Mucous membranes are moist.      Pharynx: Oropharynx is clear.   Eyes:      Extraocular Movements: Extraocular movements intact.      Pupils: Pupils are equal, round, and reactive to light.   Cardiovascular:      Rate and Rhythm: Normal rate and regular rhythm.      Pulses: Normal pulses.      Heart sounds: Normal heart sounds.   Pulmonary:      Effort: Pulmonary effort is normal. No respiratory distress.      Breath sounds: Normal breath sounds. No wheezing or rales.   Abdominal:      General: There is no distension.      Palpations: Abdomen is soft.      Tenderness:  Total shoulder arthroplasty (Bilateral); Thyroidectomy; and Dilation and curettage of uterus.    Social History:  reports that she has never smoked. She has never used smokeless tobacco. She reports that she does not drink alcohol and does not use drugs.    Family History: family history includes Breast Cancer in her niece; Cancer in her brother; Diabetes in her brother; Heart Attack in her brother; Heart Failure in her brother; High Blood Pressure in her mother and sister; High Cholesterol in her brother and sister; Other in her brother and sister.     The patient’s home medications have been reviewed.    Allergies: Adhesive tape, Pravachol [pravastatin], Toprol xl [metoprolol], and Zithromax [azithromycin]    -------------------------------------------------- RESULTS -------------------------------------------------    LABS:  Results for orders placed or performed during the hospital encounter of 08/06/24   RSV Detection    Specimen: Nasopharyngeal Swab   Result Value Ref Range    Source .NASOPHARYNGEAL SWAB     RSV by PCR Not Detected Not Detected   Influenza A+B, PCR    Specimen: Nasal   Result Value Ref Range    Influenza A by PCR Not Detected Not Detected    Influenza B by PCR Not Detected Not Detected   COVID-19, Rapid    Specimen: Nasopharyngeal Swab   Result Value Ref Range    Specimen Description .NASOPHARYNGEAL SWAB     SARS-CoV-2, Rapid Not Detected Not Detected   CBC with Auto Differential   Result Value Ref Range    WBC 8.7 4.5 - 11.5 k/uL    RBC 4.84 3.50 - 5.50 m/uL    Hemoglobin 13.9 11.5 - 15.5 g/dL    Hematocrit 43.9 34.0 - 48.0 %    MCV 90.7 80.0 - 99.9 fL    MCH 28.7 26.0 - 35.0 pg    MCHC 31.7 (L) 32.0 - 34.5 g/dL    RDW 13.6 11.5 - 15.0 %    Platelets 313 130 - 450 k/uL    MPV 9.3 7.0 - 12.0 fL    Neutrophils % 61 43.0 - 80.0 %    Lymphocytes % 31 20.0 - 42.0 %    Monocytes % 6 2.0 - 12.0 %    Eosinophils % 1 0 - 6 %    Basophils % 1 0.0 - 2.0 %    Immature Granulocytes % 0 0.0 - 5.0 %

## 2024-08-07 NOTE — H&P
[facility] Hospitalist Group History and Physical          PCP: Vazquez Little DO    Date of Admission: 2024    Date of Service: Pt seen/examined on 2024 and is admitted to Inpatient with expected LOS greater than two midnights due to medical therapy.  Placed in Observation.    Chief Complaint:  had concerns including Altered Mental Status (For the past two hours, sent in by urgent care), Extremity Weakness, and Dizziness.    History Of Present Illness:    Ms. Yecenia Haro, a 83 y.o. year old female  who  has a past medical history of Anxiety, Arthritis, Cancer (HCC), Depression, Eye disorder, Hives, Hyperlipidemia, Hypertension, Measles, Melanocytoma of retina, Osteoporosis, Thyroid disease, Vitamin B12 deficiency (non anemic), and Whooping cough.     Who presents emergency department with decreased appetite, weakness, confusion.  At the time of my evaluation patient was alert noted x 4, no collateral information, reportedly she came in with her son.  At that time she complained of headache, stumbling her words, gait instability.  Upon my assessment patient's main complaint was that she could not get over the death of her  who  approximately January of this year, she was emotionally labile, but denies any suicidal, homicidal ideation.  Denies any headache, chest pain, nausea, vomiting, lightheadedness, dizziness, urinary symptoms.  In the ED patient was noted to be generalized weak, but no falls, bleeding, bruising noted.  She was eating sandwich with no difficulty.      Past Medical History:        Diagnosis Date    Anxiety     Arthritis     Cancer (HCC)     Depression     Eye disorder     Hives     Hyperlipidemia     Hypertension     Measles     Melanocytoma of retina     Osteoporosis     Thyroid disease     Vitamin B12 deficiency (non anemic)     Whooping cough        Past Surgical History:        Procedure Laterality Date    CARPAL TUNNEL RELEASE      CARPAL TUNNEL RELEASE Left   slurred speech, headache, confusion  Completely resolved, except generalized weakness, nonfocal exam  EKG sinus rhythm, hemodynamically stable, afebrile, labs within normal limits  CT head showed no acute intracranial abnormality,, chest x-ray unremarkable  Aspirin provided, neurochecks every 4 hours, MRI brain, PT/OT, speech consult  Fall precautions, patient tolerating p.o. intake in the ED, statin allergy.    Anxiety/depression  Chronic grief  Patient is emotional, crying due to her loss of her  January 2024 per patient  Chronic weight loss, resume home Remeron, , consider grieve counseling.    Diet: ADULT DIET; Regular  Code Status: DNR-CCA    Surrogate decision maker confirmed with patient:  Primary Emergency Contact: Humberto Haro, Home Phone: [phone]    DVT Prophylaxis: [x]Lovenox []Heparin []PCD [] Warfarin/NOAC []Encouraged ambulation    Disposition: [x]Med/Surg [] Intermediate [] ICU/CCU  Admit status: [x] Observation [] Inpatient     +++++++++++++++++++++++++++++++++++++++++++++++++  Dr. JOY Rebolledo [facility]  [employer], [place]  +++++++++++++++++++++++++++++++++++++++++++++++++  NOTE: This report was transcribed using voice recognition software. Every effort was made to ensure accuracy; however, inadvertent computerized transcription errors may be present.

## 2024-08-08 LAB
EKG ATRIAL RATE: 61 BPM
EKG P AXIS: 65 DEGREES
EKG P-R INTERVAL: 132 MS
EKG Q-T INTERVAL: 416 MS
EKG QRS DURATION: 74 MS
EKG QTC CALCULATION (BAZETT): 418 MS
EKG R AXIS: 28 DEGREES
EKG T AXIS: 65 DEGREES
EKG VENTRICULAR RATE: 61 BPM
MICROORGANISM SPEC CULT: ABNORMAL
SERVICE CMNT-IMP: ABNORMAL
SPECIMEN DESCRIPTION: ABNORMAL

## 2024-08-08 PROCEDURE — 93010 ELECTROCARDIOGRAM REPORT: CPT | Performed by: INTERNAL MEDICINE

## 2024-08-09 ENCOUNTER — OFFICE VISIT (OUTPATIENT)
Dept: FAMILY MEDICINE CLINIC | Age: 84
End: 2024-08-09

## 2024-08-09 VITALS
TEMPERATURE: 98.6 F | RESPIRATION RATE: 16 BRPM | DIASTOLIC BLOOD PRESSURE: 70 MMHG | OXYGEN SATURATION: 96 % | HEART RATE: 80 BPM | SYSTOLIC BLOOD PRESSURE: 130 MMHG | HEIGHT: 60 IN | WEIGHT: 110 LBS | BODY MASS INDEX: 21.6 KG/M2

## 2024-08-09 DIAGNOSIS — I10 ESSENTIAL HYPERTENSION: ICD-10-CM

## 2024-08-09 DIAGNOSIS — I10 PRIMARY HYPERTENSION: ICD-10-CM

## 2024-08-09 DIAGNOSIS — C69.90: ICD-10-CM

## 2024-08-09 DIAGNOSIS — G45.9 TIA (TRANSIENT ISCHEMIC ATTACK): Primary | ICD-10-CM

## 2024-08-09 DIAGNOSIS — F41.9 ANXIETY: ICD-10-CM

## 2024-08-09 DIAGNOSIS — C80.1 CANCER (HCC): ICD-10-CM

## 2024-08-09 DIAGNOSIS — E78.2 MIXED HYPERLIPIDEMIA: ICD-10-CM

## 2024-08-09 DIAGNOSIS — E07.9 THYROID DISEASE: ICD-10-CM

## 2024-08-09 DIAGNOSIS — H81.10 BENIGN PAROXYSMAL POSITIONAL VERTIGO, UNSPECIFIED LATERALITY: ICD-10-CM

## 2024-08-09 DIAGNOSIS — F43.21 GRIEF REACTION: ICD-10-CM

## 2024-08-09 DIAGNOSIS — E83.42 HYPOMAGNESEMIA: ICD-10-CM

## 2024-08-09 DIAGNOSIS — Z86.73 HISTORY OF STROKE: ICD-10-CM

## 2024-08-09 RX ORDER — MIRTAZAPINE 7.5 MG/1
7.5 TABLET, FILM COATED ORAL NIGHTLY
Qty: 30 TABLET | Refills: 5 | Status: SHIPPED | OUTPATIENT
Start: 2024-08-09

## 2024-08-09 RX ORDER — LORAZEPAM 0.5 MG/1
0.5 TABLET ORAL 2 TIMES DAILY PRN
Qty: 60 TABLET | Refills: 1 | Status: SHIPPED | OUTPATIENT
Start: 2024-08-09 | End: 2024-09-08

## 2024-08-09 NOTE — PROGRESS NOTES
24  Yecenia Haro : 1940 Sex: female  Age: 83 y.o.    Chief Complaint   Patient presents with    Other     Went to the ED and was to be admitted, but never was able to get in a room, patient left AMA, according to the ED note patient had a TIA       HPI  post hospital tia   Had an episode of slurred speech     Review of Systems   Constitutional:  Negative for activity change, appetite change, chills, diaphoresis, fatigue, fever and unexpected weight change.        Still greiving  death and difficult to live with son ,very difficult circumstances    HENT:  Positive for postnasal drip (stidrainage posterior through). Negative for congestion, dental problem, drooling, ear discharge, ear pain, facial swelling, hearing loss, mouth sores, nosebleeds, rhinorrhea, sinus pressure, sinus pain, sneezing, sore throat, tinnitus, trouble swallowing and voice change.         Had slurred speech    Eyes:  Negative for photophobia, pain, discharge, redness, itching and visual disturbance.   Respiratory:  Positive for chest tightness (tenderness from muscles). Negative for apnea, choking, wheezing and stridor. Cough: has waned. Shortness of breath: mild.   Cardiovascular:  Negative for chest pain, palpitations and leg swelling.   Gastrointestinal:  Negative for abdominal distention, abdominal pain, anal bleeding, blood in stool, constipation, diarrhea, nausea, rectal pain and vomiting.   Endocrine: Negative for cold intolerance, heat intolerance, polydipsia, polyphagia and polyuria.   Genitourinary:  Negative for decreased urine volume, difficulty urinating, dysuria, enuresis, flank pain, frequency, genital sores, hematuria and urgency.   Musculoskeletal:  Positive for arthralgias (left knee). Negative for back pain, gait problem, joint swelling, myalgias, neck pain and neck stiffness.        No new neck or back pain   Did not hit head   Was crying a lot but says not hurt      Skin:  Negative for color change,

## 2024-08-11 LAB
MICROORGANISM SPEC CULT: NORMAL
MICROORGANISM SPEC CULT: NORMAL
SERVICE CMNT-IMP: NORMAL
SERVICE CMNT-IMP: NORMAL
SPECIMEN DESCRIPTION: NORMAL
SPECIMEN DESCRIPTION: NORMAL

## 2024-08-12 LAB
MICROORGANISM SPEC CULT: NORMAL
SERVICE CMNT-IMP: NORMAL
SPECIMEN DESCRIPTION: NORMAL

## 2024-09-06 PROBLEM — Z86.73 HISTORY OF STROKE: Status: ACTIVE | Noted: 2024-09-06

## 2024-09-06 ASSESSMENT — ENCOUNTER SYMPTOMS
VOICE CHANGE: 0
TROUBLE SWALLOWING: 0
SINUS PRESSURE: 0
EYE DISCHARGE: 0
SORE THROAT: 0
BLOOD IN STOOL: 0
ABDOMINAL DISTENTION: 0
COLOR CHANGE: 0
VOMITING: 0
RECTAL PAIN: 0
NAUSEA: 0
FACIAL SWELLING: 0
PHOTOPHOBIA: 0
STRIDOR: 0
CHOKING: 0
EYE PAIN: 0
DIARRHEA: 0
SINUS PAIN: 0
RHINORRHEA: 0
ABDOMINAL PAIN: 0
CONSTIPATION: 0
WHEEZING: 0
EYE ITCHING: 0
BACK PAIN: 0
CHEST TIGHTNESS: 1
APNEA: 0
ANAL BLEEDING: 0
EYE REDNESS: 0

## 2024-09-20 ENCOUNTER — OFFICE VISIT (OUTPATIENT)
Dept: FAMILY MEDICINE CLINIC | Age: 84
End: 2024-09-20
Payer: MEDICARE

## 2024-09-20 VITALS
SYSTOLIC BLOOD PRESSURE: 138 MMHG | TEMPERATURE: 97.9 F | WEIGHT: 111.7 LBS | RESPIRATION RATE: 16 BRPM | BODY MASS INDEX: 21.93 KG/M2 | HEIGHT: 60 IN | DIASTOLIC BLOOD PRESSURE: 88 MMHG | HEART RATE: 68 BPM | OXYGEN SATURATION: 98 %

## 2024-09-20 DIAGNOSIS — M19.90 ARTHRITIS: ICD-10-CM

## 2024-09-20 DIAGNOSIS — M81.0 AGE-RELATED OSTEOPOROSIS WITHOUT CURRENT PATHOLOGICAL FRACTURE: ICD-10-CM

## 2024-09-20 DIAGNOSIS — R53.1 WEAKNESS: ICD-10-CM

## 2024-09-20 DIAGNOSIS — F32.A DEPRESSION, UNSPECIFIED DEPRESSION TYPE: ICD-10-CM

## 2024-09-20 DIAGNOSIS — H81.10 BENIGN PAROXYSMAL POSITIONAL VERTIGO, UNSPECIFIED LATERALITY: ICD-10-CM

## 2024-09-20 DIAGNOSIS — E78.2 MIXED HYPERLIPIDEMIA: ICD-10-CM

## 2024-09-20 DIAGNOSIS — E07.9 THYROID DISEASE: ICD-10-CM

## 2024-09-20 DIAGNOSIS — H57.9 UNSPECIFIED DISORDER OF EYE AND ADNEXA: ICD-10-CM

## 2024-09-20 DIAGNOSIS — I10 ESSENTIAL HYPERTENSION: Primary | ICD-10-CM

## 2024-09-20 DIAGNOSIS — C69.20 MALIGNANT NEOPLASM OF RETINA, UNSPECIFIED LATERALITY (HCC): ICD-10-CM

## 2024-09-20 DIAGNOSIS — E83.42 HYPOMAGNESEMIA: ICD-10-CM

## 2024-09-20 DIAGNOSIS — E53.8 VITAMIN B12 DEFICIENCY (NON ANEMIC): ICD-10-CM

## 2024-09-20 PROCEDURE — 1123F ACP DISCUSS/DSCN MKR DOCD: CPT | Performed by: FAMILY MEDICINE

## 2024-09-20 PROCEDURE — G8427 DOCREV CUR MEDS BY ELIG CLIN: HCPCS | Performed by: FAMILY MEDICINE

## 2024-09-20 PROCEDURE — 3079F DIAST BP 80-89 MM HG: CPT | Performed by: FAMILY MEDICINE

## 2024-09-20 PROCEDURE — 1090F PRES/ABSN URINE INCON ASSESS: CPT | Performed by: FAMILY MEDICINE

## 2024-09-20 PROCEDURE — G8400 PT W/DXA NO RESULTS DOC: HCPCS | Performed by: FAMILY MEDICINE

## 2024-09-20 PROCEDURE — 99213 OFFICE O/P EST LOW 20 MIN: CPT | Performed by: FAMILY MEDICINE

## 2024-09-20 PROCEDURE — G8420 CALC BMI NORM PARAMETERS: HCPCS | Performed by: FAMILY MEDICINE

## 2024-09-20 PROCEDURE — 1036F TOBACCO NON-USER: CPT | Performed by: FAMILY MEDICINE

## 2024-09-20 PROCEDURE — 3075F SYST BP GE 130 - 139MM HG: CPT | Performed by: FAMILY MEDICINE

## 2024-09-20 RX ORDER — LORAZEPAM 0.5 MG/1
TABLET ORAL
COMMUNITY
Start: 2024-09-07

## 2024-09-20 RX ORDER — ASPIRIN 81 MG/1
81 TABLET ORAL DAILY
COMMUNITY

## 2024-09-20 SDOH — ECONOMIC STABILITY: FOOD INSECURITY: WITHIN THE PAST 12 MONTHS, YOU WORRIED THAT YOUR FOOD WOULD RUN OUT BEFORE YOU GOT MONEY TO BUY MORE.: NEVER TRUE

## 2024-09-20 SDOH — ECONOMIC STABILITY: FOOD INSECURITY: WITHIN THE PAST 12 MONTHS, THE FOOD YOU BOUGHT JUST DIDN'T LAST AND YOU DIDN'T HAVE MONEY TO GET MORE.: NEVER TRUE

## 2024-09-20 SDOH — ECONOMIC STABILITY: INCOME INSECURITY: HOW HARD IS IT FOR YOU TO PAY FOR THE VERY BASICS LIKE FOOD, HOUSING, MEDICAL CARE, AND HEATING?: NOT HARD AT ALL

## 2024-09-20 NOTE — PROGRESS NOTES
24  Yecenia Haro : 1940 Sex: female  Age: 84 y.o.    Chief Complaint   Patient presents with    Follow-up     1 month follow up, no new concerns       HPI  overall  check up             Review of Systems   Constitutional:  Negative for activity change, appetite change, chills, diaphoresis, fatigue, fever and unexpected weight change.        Still greiving  death and difficult to live with son ,very difficult circumstances    HENT:  Positive for postnasal drip (stidrainage posterior through). Negative for congestion, dental problem, drooling, ear discharge, ear pain, facial swelling, hearing loss, mouth sores, nosebleeds, rhinorrhea, sinus pressure, sinus pain, sneezing, sore throat, tinnitus, trouble swallowing and voice change.         Had slurred speech    Eyes:  Negative for photophobia, pain, discharge, redness, itching and visual disturbance.   Respiratory:  Positive for chest tightness (tenderness from muscles). Negative for apnea, choking, wheezing and stridor. Cough: has waned. Shortness of breath: mild.   Cardiovascular:  Negative for chest pain, palpitations and leg swelling.   Gastrointestinal:  Negative for abdominal distention, abdominal pain, anal bleeding, blood in stool, constipation, diarrhea, nausea, rectal pain and vomiting.        Appetite is still a problem    Endocrine: Negative for cold intolerance, heat intolerance, polydipsia, polyphagia and polyuria.   Genitourinary:  Negative for decreased urine volume, difficulty urinating, dysuria, enuresis, flank pain, frequency, genital sores, hematuria and urgency.   Musculoskeletal:  Positive for arthralgias (left knee). Negative for back pain, gait problem, joint swelling, myalgias, neck pain and neck stiffness.        No new neck or back pain   Did not hit head   Was crying a lot but says not hurt      Skin:  Negative for color change, pallor, rash and wound.   Allergic/Immunologic: Negative for environmental allergies,

## 2024-10-02 ENCOUNTER — TELEPHONE (OUTPATIENT)
Dept: FAMILY MEDICINE CLINIC | Age: 84
End: 2024-10-02

## 2024-10-02 NOTE — TELEPHONE ENCOUNTER
I am alll for the higher strength flu shot  I am not sure on the covid shot she does not have lung disease and we have meds to treat the covid and there are some side effects and the new covid is an updated and new  would not necessariy do that shot but she could if she wants ..

## 2024-10-02 NOTE — TELEPHONE ENCOUNTER
Pts son called in and wants to know what you recommended for her to have vaccine wise ? States that Yecenia told him she could get a flu and covid shot but he wants to know what flu and covid shot she should have ?

## 2024-10-05 DIAGNOSIS — F41.9 ANXIETY: ICD-10-CM

## 2024-10-05 DIAGNOSIS — M19.90 ARTHRITIS: Primary | ICD-10-CM

## 2024-10-07 RX ORDER — LORAZEPAM 0.5 MG/1
TABLET ORAL
Qty: 60 TABLET | Refills: 0 | Status: SHIPPED | OUTPATIENT
Start: 2024-10-07 | End: 2024-11-06

## 2024-10-15 DIAGNOSIS — F41.9 ANXIETY: ICD-10-CM

## 2024-10-15 RX ORDER — LORAZEPAM 0.5 MG/1
TABLET ORAL
Qty: 60 TABLET | Refills: 0 | OUTPATIENT
Start: 2024-10-15 | End: 2024-11-14

## 2024-10-21 ENCOUNTER — OFFICE VISIT (OUTPATIENT)
Dept: FAMILY MEDICINE CLINIC | Age: 84
End: 2024-10-21

## 2024-10-21 VITALS
SYSTOLIC BLOOD PRESSURE: 130 MMHG | HEIGHT: 60 IN | TEMPERATURE: 97.5 F | BODY MASS INDEX: 22.03 KG/M2 | HEART RATE: 81 BPM | DIASTOLIC BLOOD PRESSURE: 72 MMHG | RESPIRATION RATE: 16 BRPM | OXYGEN SATURATION: 97 % | WEIGHT: 112.2 LBS

## 2024-10-21 DIAGNOSIS — F43.21 GRIEF REACTION: ICD-10-CM

## 2024-10-21 DIAGNOSIS — C69.90: ICD-10-CM

## 2024-10-21 DIAGNOSIS — G45.9 TIA (TRANSIENT ISCHEMIC ATTACK): ICD-10-CM

## 2024-10-21 DIAGNOSIS — E83.42 HYPOMAGNESEMIA: ICD-10-CM

## 2024-10-21 DIAGNOSIS — I10 PRIMARY HYPERTENSION: ICD-10-CM

## 2024-10-21 DIAGNOSIS — Z23 FLU VACCINE NEED: Primary | ICD-10-CM

## 2024-10-21 DIAGNOSIS — E07.9 THYROID DISEASE: ICD-10-CM

## 2024-10-21 DIAGNOSIS — E53.8 VITAMIN B12 DEFICIENCY (NON ANEMIC): ICD-10-CM

## 2024-10-21 NOTE — PROGRESS NOTES
10/21/24  Yecenia Haro : 1940 Sex: female  Age: 84 y.o.    Chief Complaint   Patient presents with    Follow-up     1 month follow up, left knee swollen and painful       HPI  overall check up   Left knee swelling and pain       Review of Systems   Constitutional:  Negative for activity change, appetite change, chills, diaphoresis, fatigue, fever and unexpected weight change.        Still greiving  death and difficult to live with son ,very difficult circumstances    HENT:  Positive for postnasal drip (stidrainage posterior through). Negative for congestion, dental problem, drooling, ear discharge, ear pain, facial swelling, hearing loss, mouth sores, nosebleeds, rhinorrhea, sinus pressure, sinus pain, sneezing, sore throat, tinnitus, trouble swallowing and voice change.         Had slurred speech    Eyes:  Negative for photophobia, pain, discharge, redness, itching and visual disturbance.   Respiratory:  Positive for chest tightness (tenderness from muscles). Negative for apnea, choking, wheezing and stridor. Cough: has waned. Shortness of breath: mild.   Cardiovascular:  Negative for chest pain, palpitations and leg swelling.   Gastrointestinal:  Negative for abdominal distention, abdominal pain, anal bleeding, blood in stool, constipation, diarrhea, nausea, rectal pain and vomiting.        Appetite is still a problem    Endocrine: Negative for cold intolerance, heat intolerance, polydipsia, polyphagia and polyuria.   Genitourinary:  Negative for decreased urine volume, difficulty urinating, dysuria, enuresis, flank pain, frequency, genital sores, hematuria and urgency.   Musculoskeletal:  Positive for arthralgias (left knee pain left shoulder pain). Negative for back pain, gait problem, joint swelling, myalgias, neck pain and neck stiffness.        No new neck or back pain   Did not hit head   Was crying a lot but says not hurt      Skin:  Negative for color change, pallor, rash and wound.

## 2024-10-29 ENCOUNTER — TELEPHONE (OUTPATIENT)
Dept: FAMILY MEDICINE CLINIC | Age: 84
End: 2024-10-29

## 2024-10-29 DIAGNOSIS — F41.9 ANXIETY: ICD-10-CM

## 2024-10-29 NOTE — TELEPHONE ENCOUNTER
Name of Medication(s) Requested:  Requested Prescriptions     Pending Prescriptions Disp Refills    LORazepam (ATIVAN) 0.5 MG tablet 60 tablet 0     Sig: Take 1 tablet by mouth 2 times daily for 30 days. Max Daily Amount: 1 mg       Medication is on current medication list Yes    Dosage and directions were verified? Yes    Quantity verified: 30 day supply     Pharmacy Verified?  Yes    Last Appointment:  10/21/2024    Future appts:  Future Appointments   Date Time Provider Department Center   11/18/2024 11:00 AM Vazquez Little DO [facility]   6/16/2025 11:00 AM Vazquez Little DO [facility]        (If no appt send self scheduling link. .REFILLAPPT)  Scheduling request sent?     [] Yes  [x] No    Does patient need updated?  [] Yes  [x] No

## 2024-10-30 DIAGNOSIS — F41.9 ANXIETY: ICD-10-CM

## 2024-10-30 RX ORDER — LORAZEPAM 0.5 MG/1
0.5 TABLET ORAL 2 TIMES DAILY
Qty: 60 TABLET | Refills: 0 | Status: SHIPPED
Start: 2024-10-30 | End: 2024-10-30 | Stop reason: SDUPTHER

## 2024-10-30 RX ORDER — LORAZEPAM 0.5 MG/1
0.5 TABLET ORAL 2 TIMES DAILY
Qty: 60 TABLET | Refills: 0 | Status: SHIPPED | OUTPATIENT
Start: 2024-10-30 | End: 2024-11-29

## 2024-10-30 NOTE — TELEPHONE ENCOUNTER
Name of Medication(s) Requested:  Requested Prescriptions     Pending Prescriptions Disp Refills    LORazepam (ATIVAN) 0.5 MG tablet 60 tablet 0     Sig: Take 1 tablet by mouth 2 times daily for 30 days. Max Daily Amount: 1 mg       Medication is on current medication list Yes    Dosage and directions were verified? Yes    Quantity verified: 30 day supply     Pharmacy Verified?  Yes    Last Appointment:  10/21/2024    Future appts:  Future Appointments   Date Time Provider Department Center   11/18/2024 11:00 AM Vazquez Little DO [facility]   6/16/2025 11:00 AM Vazquez Little DO [facility]        (If no appt send self scheduling link. .REFILLAPPT)  Scheduling request sent?     [] Yes  [x] No    Does patient need updated?  [] Yes  [x] No

## 2024-11-04 ENCOUNTER — HOSPITAL ENCOUNTER (EMERGENCY)
Age: 84
Discharge: HOME | End: 2024-11-04
Attending: EMERGENCY MEDICINE
Payer: MEDICARE

## 2024-11-04 ENCOUNTER — APPOINTMENT (OUTPATIENT)
Dept: GENERAL RADIOLOGY | Age: 84
End: 2024-11-04
Payer: MEDICARE

## 2024-11-04 ENCOUNTER — APPOINTMENT (OUTPATIENT)
Dept: CT IMAGING | Age: 84
End: 2024-11-04
Payer: MEDICARE

## 2024-11-04 VITALS
TEMPERATURE: 97.7 F | DIASTOLIC BLOOD PRESSURE: 85 MMHG | HEART RATE: 85 BPM | OXYGEN SATURATION: 96 % | RESPIRATION RATE: 20 BRPM | WEIGHT: 110 LBS | SYSTOLIC BLOOD PRESSURE: 159 MMHG | BODY MASS INDEX: 20.24 KG/M2 | HEIGHT: 62 IN

## 2024-11-04 DIAGNOSIS — F41.9 ANXIETY: ICD-10-CM

## 2024-11-04 DIAGNOSIS — R53.1 GENERAL WEAKNESS: Primary | ICD-10-CM

## 2024-11-04 LAB
ALBUMIN SERPL-MCNC: 4.1 G/DL (ref 3.5–5.2)
ALP SERPL-CCNC: 87 U/L (ref 35–104)
ALT SERPL-CCNC: 10 U/L (ref 0–32)
ANION GAP SERPL CALCULATED.3IONS-SCNC: 11 MMOL/L (ref 7–16)
AST SERPL-CCNC: 20 U/L (ref 0–31)
BASOPHILS # BLD: 0.05 K/UL (ref 0–0.2)
BASOPHILS NFR BLD: 1 % (ref 0–2)
BILIRUB SERPL-MCNC: 0.3 MG/DL (ref 0–1.2)
BILIRUB UR QL STRIP: NEGATIVE
BUN SERPL-MCNC: 9 MG/DL (ref 6–23)
CALCIUM SERPL-MCNC: 9.4 MG/DL (ref 8.6–10.2)
CHLORIDE SERPL-SCNC: 105 MMOL/L (ref 98–107)
CLARITY UR: CLEAR
CO2 SERPL-SCNC: 24 MMOL/L (ref 22–29)
COLOR UR: YELLOW
CREAT SERPL-MCNC: 0.6 MG/DL (ref 0.5–1)
EKG ATRIAL RATE: 82 BPM
EKG P AXIS: 61 DEGREES
EKG P-R INTERVAL: 134 MS
EKG Q-T INTERVAL: 364 MS
EKG QRS DURATION: 70 MS
EKG QTC CALCULATION (BAZETT): 425 MS
EKG R AXIS: 7 DEGREES
EKG T AXIS: 83 DEGREES
EKG VENTRICULAR RATE: 82 BPM
EOSINOPHIL # BLD: 0.03 K/UL (ref 0.05–0.5)
EOSINOPHILS RELATIVE PERCENT: 0 % (ref 0–6)
ERYTHROCYTE [DISTWIDTH] IN BLOOD BY AUTOMATED COUNT: 13.5 % (ref 11.5–15)
GFR, ESTIMATED: 87 ML/MIN/1.73M2
GLUCOSE SERPL-MCNC: 95 MG/DL (ref 74–99)
GLUCOSE UR STRIP-MCNC: NEGATIVE MG/DL
HCT VFR BLD AUTO: 41.6 % (ref 34–48)
HGB BLD-MCNC: 13.3 G/DL (ref 11.5–15.5)
HGB UR QL STRIP.AUTO: NEGATIVE
IMM GRANULOCYTES # BLD AUTO: <0.03 K/UL (ref 0–0.58)
IMM GRANULOCYTES NFR BLD: 0 % (ref 0–5)
KETONES UR STRIP-MCNC: NEGATIVE MG/DL
LACTATE BLDV-SCNC: 1.3 MMOL/L (ref 0.5–2.2)
LEUKOCYTE ESTERASE UR QL STRIP: NEGATIVE
LYMPHOCYTES NFR BLD: 1.98 K/UL (ref 1.5–4)
LYMPHOCYTES RELATIVE PERCENT: 29 % (ref 20–42)
MCH RBC QN AUTO: 28.8 PG (ref 26–35)
MCHC RBC AUTO-ENTMCNC: 32 G/DL (ref 32–34.5)
MCV RBC AUTO: 90 FL (ref 80–99.9)
MONOCYTES NFR BLD: 0.47 K/UL (ref 0.1–0.95)
MONOCYTES NFR BLD: 7 % (ref 2–12)
NEUTROPHILS NFR BLD: 63 % (ref 43–80)
NEUTS SEG NFR BLD: 4.25 K/UL (ref 1.8–7.3)
NITRITE UR QL STRIP: NEGATIVE
PH UR STRIP: 7 [PH] (ref 5–9)
PLATELET # BLD AUTO: 347 K/UL (ref 130–450)
PMV BLD AUTO: 9.3 FL (ref 7–12)
POTASSIUM SERPL-SCNC: 3.8 MMOL/L (ref 3.5–5)
PROT SERPL-MCNC: 7.3 G/DL (ref 6.4–8.3)
PROT UR STRIP-MCNC: NEGATIVE MG/DL
RBC # BLD AUTO: 4.62 M/UL (ref 3.5–5.5)
RBC #/AREA URNS HPF: ABNORMAL /HPF
SODIUM SERPL-SCNC: 140 MMOL/L (ref 132–146)
SP GR UR STRIP: <1.005 (ref 1–1.03)
TROPONIN I SERPL HS-MCNC: 7 NG/L (ref 0–9)
TROPONIN I SERPL HS-MCNC: 8 NG/L (ref 0–9)
UROBILINOGEN UR STRIP-ACNC: 0.2 EU/DL (ref 0–1)
WBC #/AREA URNS HPF: ABNORMAL /HPF
WBC OTHER # BLD: 6.8 K/UL (ref 4.5–11.5)

## 2024-11-04 PROCEDURE — 83605 ASSAY OF LACTIC ACID: CPT

## 2024-11-04 PROCEDURE — 93010 ELECTROCARDIOGRAM REPORT: CPT | Performed by: INTERNAL MEDICINE

## 2024-11-04 PROCEDURE — 71045 X-RAY EXAM CHEST 1 VIEW: CPT

## 2024-11-04 PROCEDURE — 85025 COMPLETE CBC W/AUTO DIFF WBC: CPT

## 2024-11-04 PROCEDURE — 81001 URINALYSIS AUTO W/SCOPE: CPT

## 2024-11-04 PROCEDURE — 93005 ELECTROCARDIOGRAM TRACING: CPT | Performed by: EMERGENCY MEDICINE

## 2024-11-04 PROCEDURE — 84484 ASSAY OF TROPONIN QUANT: CPT

## 2024-11-04 PROCEDURE — 70450 CT HEAD/BRAIN W/O DYE: CPT

## 2024-11-04 PROCEDURE — 80053 COMPREHEN METABOLIC PANEL: CPT

## 2024-11-04 PROCEDURE — 99285 EMERGENCY DEPT VISIT HI MDM: CPT

## 2024-11-04 NOTE — PROGRESS NOTES
Spiritual Health History and Assessment/Progress Note  [facility]     Encounter, Rituals, Rites and Sacraments,  ,  ,      Name: Yecenia Haro MRN: [id number]    Age: 84 y.o.     Sex: female   Language: English   Latter day: Presybeterian   <principal problem not specified>     Date: 11/4/2024                           Spiritual Assessment began in [facility] EMERGENCY DEPARTMENT        Referral/Consult From: Family, Other    Encounter Overview/Reason:  Encounter, Rituals, Rites and Sacraments  Service Provided For: Patient    Adamaris, Belief, Meaning:   Patient is connected with a adamaris tradition or spiritual practice  Family/Friends are connected with a adamaris tradition or spiritual practice      Importance and Influence:  Patient has no beliefs influential to healthcare decision-making identified during this visit  Family/Friends have no beliefs influential to healthcare decision-making identified during this visit    Community:  Patient feels well-supported. Support system includes: Children  Family/Friends feel well-supported. Support system includes: Other: Siblings    Assessment and Plan of Care:     Patient Interventions include: Provided sacramental/Sabianist ritual  Family/Friends Interventions include: Affirmed coping skills/support systems    Patient Plan of Care: Spiritual Care available upon further referral  Family/Friends Plan of Care: Spiritual Care available upon further referral    Electronically signed by Chaplain Keerthi on 11/4/2024 at 4:01 PM

## 2024-11-04 NOTE — ED PROVIDER NOTES
Reassessment, disposition considerations/shared decision making with patient, consults, disposition:      ED Course as of 11/04/24 1955 Mon Nov 04, 2024 1720 Patient is an 84-year-old female lives at home with her son who is at bedside helping to provide history who states he is she has been having increasing fatigue and not eating well in recent days.  She was at the kitchen table today and asked to come to the hospital.  She states she just \"did not feel right\".  No chest pain or shortness of breath no numbness or weakness to the extremities no sweating or nausea.  Son states she is undergoing therapy for depression as she lost her  earlier this year.  She has not had any fevers or chills.  She denies any speech abnormalities or headache.  No urinary complaints. [KK]   1721 NIH Stroke Scale/Score at time of initial evaluation:  1A: Level of Consciousness 0 - alert; keenly responsive  1B: Ask Month and Age 0 - answers both questions correctly  1C: Tell Patient To Open and Close Eyes, then Hand  Squeeze 0 - performs both tasks correctly  2: Test Horizontal Extraocular Movements 0 - normal  3: Test Visual Fields 0 - no visual loss  4: Test Facial Palsy 0 - normal symmetric movement  5A: Test Left Arm Motor Drift 0 - no drift, limb holds 90 (or 45) degrees for full 10 seconds  5B: Test Right Arm Motor Drift 0 - no drift, limb holds 90 (or 45) degrees for full 10 seconds  6A: Test Left Leg Motor Drift 0 - no drift; leg holds 30 degree position for full 5 seconds  6B: Test Right Leg Motor Drift 0 - no drift; leg holds 30 degree position for full 5 seconds  7: Test Limb Ataxia   (FNF/Heel-Shin) 0 - absent  8: Test Sensation 0 - normal; no sensory loss  9: Test Language/Aphasia 0 - no aphasia, normal  10: Test Dysarthria 0 - normal  11: Test Extinction/Inattention 0 - no abnormality  Total Score: 0  11/4/24 at 5:21 PM EST. [KK]   1723 EKG shows normal sinus rhythm 82 bpm no signs of ST changes no ST

## 2024-11-18 ENCOUNTER — OFFICE VISIT (OUTPATIENT)
Dept: FAMILY MEDICINE CLINIC | Age: 84
End: 2024-11-18

## 2024-11-18 VITALS
SYSTOLIC BLOOD PRESSURE: 128 MMHG | TEMPERATURE: 98.7 F | BODY MASS INDEX: 20.54 KG/M2 | OXYGEN SATURATION: 96 % | DIASTOLIC BLOOD PRESSURE: 72 MMHG | HEIGHT: 62 IN | RESPIRATION RATE: 16 BRPM | WEIGHT: 111.6 LBS | HEART RATE: 67 BPM

## 2024-11-18 DIAGNOSIS — I10 PRIMARY HYPERTENSION: ICD-10-CM

## 2024-11-18 DIAGNOSIS — J02.9 THROAT INFECTION: ICD-10-CM

## 2024-11-18 DIAGNOSIS — I10 ESSENTIAL HYPERTENSION: ICD-10-CM

## 2024-11-18 DIAGNOSIS — R53.1 WEAKNESS: ICD-10-CM

## 2024-11-18 DIAGNOSIS — F41.9 ANXIETY: ICD-10-CM

## 2024-11-18 DIAGNOSIS — C80.1 CANCER (HCC): ICD-10-CM

## 2024-11-18 DIAGNOSIS — M19.90 ARTHRITIS: ICD-10-CM

## 2024-11-18 DIAGNOSIS — E83.42 HYPOMAGNESEMIA: ICD-10-CM

## 2024-11-18 DIAGNOSIS — F41.0 ANXIETY ATTACK: Primary | ICD-10-CM

## 2024-11-18 DIAGNOSIS — H81.10 BENIGN PAROXYSMAL POSITIONAL VERTIGO, UNSPECIFIED LATERALITY: ICD-10-CM

## 2024-11-18 DIAGNOSIS — G45.9 TIA (TRANSIENT ISCHEMIC ATTACK): ICD-10-CM

## 2024-11-18 RX ORDER — AMOXICILLIN 500 MG/1
500 CAPSULE ORAL 2 TIMES DAILY
Qty: 14 CAPSULE | Refills: 0 | Status: SHIPPED | OUTPATIENT
Start: 2024-11-18 | End: 2024-11-25

## 2024-11-18 ASSESSMENT — ENCOUNTER SYMPTOMS
NAUSEA: 0
PHOTOPHOBIA: 0
ANAL BLEEDING: 0
VOICE CHANGE: 0
SINUS PAIN: 0
RECTAL PAIN: 0
TROUBLE SWALLOWING: 0
EYE ITCHING: 0
RHINORRHEA: 0
DIARRHEA: 0
FACIAL SWELLING: 0
STRIDOR: 0
COLOR CHANGE: 0
APNEA: 0
EYE REDNESS: 0
ABDOMINAL PAIN: 0
CHEST TIGHTNESS: 1
BACK PAIN: 0
WHEEZING: 0
SORE THROAT: 0
BLOOD IN STOOL: 0
ABDOMINAL DISTENTION: 0
EYE PAIN: 0
VOMITING: 0
SINUS PRESSURE: 0
CONSTIPATION: 0
EYE DISCHARGE: 0
CHOKING: 0

## 2024-11-18 NOTE — PROGRESS NOTES
24  Yecenia Haro : 1940 Sex: female  Age: 84 y.o.    Chief Complaint   Patient presents with    Follow-up     1 month follow up, was that the ED in [place] earlier this month       HPIfollow from er visit at er  at [facility]   Had effecte=s possible like stioke or bad anxiety attcack   Conclusion was anxiety attack       Review of Systems   Constitutional:  Negative for activity change, appetite change, chills, diaphoresis, fatigue, fever and unexpected weight change.        Still greiving  death and difficult to live with son ,very difficult circumstances    HENT:  Positive for postnasal drip (stidrainage posterior through). Negative for congestion, dental problem, drooling, ear discharge, ear pain, facial swelling, hearing loss, mouth sores, nosebleeds (mucous), rhinorrhea, sinus pressure, sinus pain, sneezing, sore throat, tinnitus, trouble swallowing and voice change.         Mucous in theroat    Eyes:  Negative for photophobia, pain, discharge, redness, itching and visual disturbance.   Respiratory:  Positive for chest tightness (tenderness from muscles). Negative for apnea, choking, wheezing and stridor. Cough: has waned. Shortness of breath: mild.   Cardiovascular:  Negative for chest pain, palpitations and leg swelling.   Gastrointestinal:  Negative for abdominal distention, abdominal pain, anal bleeding, blood in stool, constipation, diarrhea, nausea, rectal pain and vomiting.        Appetite is still a problem    Endocrine: Negative for cold intolerance, heat intolerance, polydipsia, polyphagia and polyuria.   Genitourinary:  Negative for decreased urine volume, difficulty urinating, dysuria, enuresis, flank pain, frequency, genital sores, hematuria and urgency.   Musculoskeletal:  Positive for arthralgias (left knee pain left shoulder pain). Negative for back pain, gait problem, joint swelling, myalgias, neck pain and neck stiffness.        No new neck or back pain   Did

## 2024-12-02 ENCOUNTER — OFFICE VISIT (OUTPATIENT)
Dept: FAMILY MEDICINE CLINIC | Age: 84
End: 2024-12-02
Payer: MEDICARE

## 2024-12-02 VITALS
HEIGHT: 62 IN | DIASTOLIC BLOOD PRESSURE: 76 MMHG | WEIGHT: 113.4 LBS | TEMPERATURE: 97.5 F | RESPIRATION RATE: 16 BRPM | BODY MASS INDEX: 20.87 KG/M2 | HEART RATE: 88 BPM | SYSTOLIC BLOOD PRESSURE: 130 MMHG | OXYGEN SATURATION: 97 %

## 2024-12-02 DIAGNOSIS — E07.9 THYROID DISEASE: ICD-10-CM

## 2024-12-02 DIAGNOSIS — I10 PRIMARY HYPERTENSION: ICD-10-CM

## 2024-12-02 DIAGNOSIS — M19.90 ARTHRITIS: ICD-10-CM

## 2024-12-02 DIAGNOSIS — F41.9 ANXIETY: Primary | ICD-10-CM

## 2024-12-02 DIAGNOSIS — E83.42 HYPOMAGNESEMIA: ICD-10-CM

## 2024-12-02 PROCEDURE — G8427 DOCREV CUR MEDS BY ELIG CLIN: HCPCS | Performed by: FAMILY MEDICINE

## 2024-12-02 PROCEDURE — 1123F ACP DISCUSS/DSCN MKR DOCD: CPT | Performed by: FAMILY MEDICINE

## 2024-12-02 PROCEDURE — G8400 PT W/DXA NO RESULTS DOC: HCPCS | Performed by: FAMILY MEDICINE

## 2024-12-02 PROCEDURE — 3075F SYST BP GE 130 - 139MM HG: CPT | Performed by: FAMILY MEDICINE

## 2024-12-02 PROCEDURE — 1160F RVW MEDS BY RX/DR IN RCRD: CPT | Performed by: FAMILY MEDICINE

## 2024-12-02 PROCEDURE — 1159F MED LIST DOCD IN RCRD: CPT | Performed by: FAMILY MEDICINE

## 2024-12-02 PROCEDURE — 1090F PRES/ABSN URINE INCON ASSESS: CPT | Performed by: FAMILY MEDICINE

## 2024-12-02 PROCEDURE — 3078F DIAST BP <80 MM HG: CPT | Performed by: FAMILY MEDICINE

## 2024-12-02 PROCEDURE — 99214 OFFICE O/P EST MOD 30 MIN: CPT | Performed by: FAMILY MEDICINE

## 2024-12-02 PROCEDURE — G8420 CALC BMI NORM PARAMETERS: HCPCS | Performed by: FAMILY MEDICINE

## 2024-12-02 PROCEDURE — 1036F TOBACCO NON-USER: CPT | Performed by: FAMILY MEDICINE

## 2024-12-02 PROCEDURE — G8482 FLU IMMUNIZE ORDER/ADMIN: HCPCS | Performed by: FAMILY MEDICINE

## 2024-12-02 RX ORDER — LORAZEPAM 0.5 MG/1
TABLET ORAL
COMMUNITY
Start: 2024-11-12 | End: 2024-12-02 | Stop reason: SDUPTHER

## 2024-12-02 RX ORDER — MIRTAZAPINE 7.5 MG/1
7.5 TABLET, FILM COATED ORAL NIGHTLY
Qty: 30 TABLET | Refills: 5 | Status: SHIPPED | OUTPATIENT
Start: 2024-12-02

## 2024-12-02 RX ORDER — LORAZEPAM 0.5 MG/1
0.5 TABLET ORAL 2 TIMES DAILY
Qty: 60 TABLET | Refills: 0 | Status: SHIPPED | OUTPATIENT
Start: 2024-12-02 | End: 2025-01-01

## 2024-12-02 NOTE — PROGRESS NOTES
24  Yecenia Haro : 1940 Sex: female  Age: 84 y.o.    Chief Complaint   Patient presents with    Follow-up     1 month follow up       HPI  overall check up   Having a lot of anxiety due to hausbands   Previous death and grave situation       Review of Systems   Constitutional:  Negative for activity change, appetite change, chills, diaphoresis, fatigue, fever and unexpected weight change.        Still greiving  death and difficult to live with son ,very difficult circumstances    HENT:  Negative for congestion, dental problem, drooling, ear discharge, ear pain, facial swelling, hearing loss, mouth sores, nosebleeds, rhinorrhea, sinus pressure, sinus pain, sneezing, sore throat, tinnitus, trouble swallowing and voice change. Postnasal drip: stidrainage posterior through.        Had slurred speech    Eyes:  Negative for photophobia, pain, discharge, redness, itching and visual disturbance.   Respiratory:  Negative for apnea, choking, wheezing and stridor. Cough: has waned. Chest tightness: tenderness from muscles. Shortness of breath: mild.   Cardiovascular:  Negative for chest pain, palpitations and leg swelling.   Gastrointestinal:  Negative for abdominal distention, abdominal pain, anal bleeding, blood in stool, constipation, diarrhea, nausea, rectal pain and vomiting.        Appetite is still a problem    Endocrine: Negative for cold intolerance, heat intolerance, polydipsia, polyphagia and polyuria.   Genitourinary:  Negative for decreased urine volume, difficulty urinating, dysuria, enuresis, flank pain, frequency, genital sores, hematuria and urgency.   Musculoskeletal:  Positive for arthralgias (left knee pain left shoulder pain). Negative for back pain, gait problem, joint swelling, myalgias, neck pain and neck stiffness.        No new neck or back pain   Did not hit head   Was crying a lot but says not hurt      Skin:  Negative for color change, pallor, rash and wound.

## 2025-01-02 ENCOUNTER — TELEPHONE (OUTPATIENT)
Dept: FAMILY MEDICINE CLINIC | Age: 85
End: 2025-01-02

## 2025-01-02 NOTE — TELEPHONE ENCOUNTER
RECEIVED CALL FROM PT WANTING TO KNOW WHO WAS CALLING HER. EXPLAINED IT WAS PROBABLY THE APPOINTMENT REMINDER FOR HER APPT ON MONDAY 1/6/25

## 2025-01-06 ENCOUNTER — OFFICE VISIT (OUTPATIENT)
Dept: FAMILY MEDICINE CLINIC | Age: 85
End: 2025-01-06

## 2025-01-06 VITALS
DIASTOLIC BLOOD PRESSURE: 80 MMHG | BODY MASS INDEX: 20.8 KG/M2 | WEIGHT: 113 LBS | HEIGHT: 62 IN | SYSTOLIC BLOOD PRESSURE: 124 MMHG | HEART RATE: 88 BPM | TEMPERATURE: 97.2 F | OXYGEN SATURATION: 98 %

## 2025-01-06 DIAGNOSIS — M81.0 AGE-RELATED OSTEOPOROSIS WITHOUT CURRENT PATHOLOGICAL FRACTURE: ICD-10-CM

## 2025-01-06 DIAGNOSIS — I10 PRIMARY HYPERTENSION: Primary | ICD-10-CM

## 2025-01-06 DIAGNOSIS — F32.A DEPRESSION, UNSPECIFIED DEPRESSION TYPE: ICD-10-CM

## 2025-01-06 DIAGNOSIS — F41.9 ANXIETY: ICD-10-CM

## 2025-01-06 DIAGNOSIS — E78.2 MIXED HYPERLIPIDEMIA: ICD-10-CM

## 2025-01-06 DIAGNOSIS — E53.8 VITAMIN B12 DEFICIENCY (NON ANEMIC): ICD-10-CM

## 2025-01-06 DIAGNOSIS — I10 ESSENTIAL HYPERTENSION: ICD-10-CM

## 2025-01-06 DIAGNOSIS — C80.1 CANCER (HCC): ICD-10-CM

## 2025-01-06 RX ORDER — MIRTAZAPINE 7.5 MG/1
7.5 TABLET, FILM COATED ORAL NIGHTLY
Qty: 30 TABLET | Refills: 5 | Status: SHIPPED | OUTPATIENT
Start: 2025-01-06

## 2025-01-06 RX ORDER — LORAZEPAM 0.5 MG/1
0.5 TABLET ORAL 2 TIMES DAILY
Qty: 60 TABLET | Refills: 0 | Status: SHIPPED | OUTPATIENT
Start: 2025-01-06 | End: 2025-02-05

## 2025-01-06 RX ORDER — LORAZEPAM 0.5 MG/1
0.5 TABLET ORAL 2 TIMES DAILY
COMMUNITY
Start: 2024-12-10

## 2025-01-06 ASSESSMENT — PATIENT HEALTH QUESTIONNAIRE - PHQ9
6. FEELING BAD ABOUT YOURSELF - OR THAT YOU ARE A FAILURE OR HAVE LET YOURSELF OR YOUR FAMILY DOWN: SEVERAL DAYS
9. THOUGHTS THAT YOU WOULD BE BETTER OFF DEAD, OR OF HURTING YOURSELF: NOT AT ALL
8. MOVING OR SPEAKING SO SLOWLY THAT OTHER PEOPLE COULD HAVE NOTICED. OR THE OPPOSITE, BEING SO FIGETY OR RESTLESS THAT YOU HAVE BEEN MOVING AROUND A LOT MORE THAN USUAL: MORE THAN HALF THE DAYS
SUM OF ALL RESPONSES TO PHQ QUESTIONS 1-9: 14
SUM OF ALL RESPONSES TO PHQ9 QUESTIONS 1 & 2: 4
3. TROUBLE FALLING OR STAYING ASLEEP: NEARLY EVERY DAY
SUM OF ALL RESPONSES TO PHQ QUESTIONS 1-9: 14
7. TROUBLE CONCENTRATING ON THINGS, SUCH AS READING THE NEWSPAPER OR WATCHING TELEVISION: SEVERAL DAYS
5. POOR APPETITE OR OVEREATING: NEARLY EVERY DAY
SUM OF ALL RESPONSES TO PHQ QUESTIONS 1-9: 14
4. FEELING TIRED OR HAVING LITTLE ENERGY: NOT AT ALL
1. LITTLE INTEREST OR PLEASURE IN DOING THINGS: SEVERAL DAYS
10. IF YOU CHECKED OFF ANY PROBLEMS, HOW DIFFICULT HAVE THESE PROBLEMS MADE IT FOR YOU TO DO YOUR WORK, TAKE CARE OF THINGS AT HOME, OR GET ALONG WITH OTHER PEOPLE: VERY DIFFICULT
SUM OF ALL RESPONSES TO PHQ QUESTIONS 1-9: 14
2. FEELING DOWN, DEPRESSED OR HOPELESS: NEARLY EVERY DAY

## 2025-01-06 ASSESSMENT — ENCOUNTER SYMPTOMS
VOMITING: 0
NAUSEA: 0
RECTAL PAIN: 0
ABDOMINAL DISTENTION: 0
RHINORRHEA: 0
BLOOD IN STOOL: 0
PHOTOPHOBIA: 0
CHOKING: 0
EYE ITCHING: 0
FACIAL SWELLING: 0
SORE THROAT: 0
BACK PAIN: 0
EYE REDNESS: 0
SINUS PRESSURE: 0
EYE DISCHARGE: 0
STRIDOR: 0
SINUS PAIN: 0
COLOR CHANGE: 0
APNEA: 0
CONSTIPATION: 0
ANAL BLEEDING: 0
VOICE CHANGE: 0
WHEEZING: 0
DIARRHEA: 0
TROUBLE SWALLOWING: 0
EYE PAIN: 0
ABDOMINAL PAIN: 0

## 2025-01-06 NOTE — PROGRESS NOTES
25  Yecenia Haro : 1940 Sex: female  Age: 84 y.o.    Chief Complaint   Patient presents with    one month      States that she is still having trouble with depression        HPI  overall check up   Still having a lot of depression and anxiety   Still seeing conseler     Review of Systems   Constitutional:  Negative for activity change, appetite change, chills, diaphoresis, fatigue, fever and unexpected weight change.        Still greiving  death and difficult to live with son ,very difficult circumstances    HENT:  Negative for congestion, dental problem, drooling, ear discharge, ear pain, facial swelling, hearing loss, mouth sores, nosebleeds, rhinorrhea, sinus pressure, sinus pain, sneezing, sore throat, tinnitus, trouble swallowing and voice change. Postnasal drip: stidrainage posterior through.        Had slurred speech    Eyes:  Negative for photophobia, pain, discharge, redness, itching and visual disturbance.   Respiratory:  Negative for apnea, choking, wheezing and stridor. Cough: has waned. Chest tightness: tenderness from muscles. Shortness of breath: mild.   Cardiovascular:  Negative for chest pain, palpitations and leg swelling.   Gastrointestinal:  Negative for abdominal distention, abdominal pain, anal bleeding, blood in stool, constipation, diarrhea, nausea, rectal pain and vomiting.        Appetite is still a problem    Endocrine: Negative for cold intolerance, heat intolerance, polydipsia, polyphagia and polyuria.   Genitourinary:  Negative for decreased urine volume, difficulty urinating, dysuria, enuresis, flank pain, frequency, genital sores, hematuria and urgency.   Musculoskeletal:  Positive for arthralgias (left knee pain left shoulder pain). Negative for back pain, gait problem, joint swelling, myalgias, neck pain and neck stiffness.        No new neck or back pain   Did not hit head   Was crying a lot but says not hurt      Skin:  Negative for color change, pallor,  Modified Advancement Flap Text: The defect edges were debeveled with a #15 scalpel blade.  Given the location of the defect, shape of the defect and the proximity to free margins a modified advancement flap was deemed most appropriate.  Using a sterile surgical marker, an appropriate advancement flap was drawn incorporating the defect and placing the expected incisions within the relaxed skin tension lines where possible.    The area thus outlined was incised deep to adipose tissue with a #15 scalpel blade.  The skin margins were undermined to an appropriate distance in all directions utilizing iris scissors.

## 2025-02-07 DIAGNOSIS — F41.9 ANXIETY: Primary | ICD-10-CM

## 2025-02-07 RX ORDER — LORAZEPAM 0.5 MG/1
TABLET ORAL
Qty: 60 TABLET | Refills: 0 | Status: SHIPPED | OUTPATIENT
Start: 2025-02-07 | End: 2025-03-09

## 2025-02-07 NOTE — TELEPHONE ENCOUNTER
Name of Medication(s) Requested:  Requested Prescriptions     Pending Prescriptions Disp Refills    LORazepam (ATIVAN) 0.5 MG tablet [Pharmacy Med Name: LORAZEPAM 0.5 MG TABLET] 60 tablet 0     Sig: TAKE ONE TABLET BY MOUTH 2 TIMES A DAY FOR 30 DAYS       Medication is on current medication list Yes    Dosage and directions were verified? Yes    Quantity verified: 30 day supply     Pharmacy Verified?  Yes    Last Appointment:  1/6/2025    Future appts:  Future Appointments   Date Time Provider Department Center   2/10/2025 10:00 AM Vazquez Little DO [facility] DE[facility]   6/16/2025 11:00 AM Vazquez Little DO [facility]        (If no appt send self scheduling link. .REFILLAPPT)  Scheduling request sent?     [] Yes  [x] No    Does patient need updated?  [] Yes  [x] No

## 2025-02-10 ENCOUNTER — OFFICE VISIT (OUTPATIENT)
Dept: FAMILY MEDICINE CLINIC | Age: 85
End: 2025-02-10

## 2025-02-10 ENCOUNTER — TELEPHONE (OUTPATIENT)
Dept: FAMILY MEDICINE CLINIC | Age: 85
End: 2025-02-10

## 2025-02-10 VITALS
HEIGHT: 62 IN | DIASTOLIC BLOOD PRESSURE: 88 MMHG | SYSTOLIC BLOOD PRESSURE: 138 MMHG | WEIGHT: 110.3 LBS | TEMPERATURE: 98.3 F | RESPIRATION RATE: 16 BRPM | HEART RATE: 73 BPM | OXYGEN SATURATION: 98 % | BODY MASS INDEX: 20.3 KG/M2

## 2025-02-10 DIAGNOSIS — E83.42 HYPOMAGNESEMIA: ICD-10-CM

## 2025-02-10 DIAGNOSIS — E53.8 VITAMIN B12 DEFICIENCY (NON ANEMIC): ICD-10-CM

## 2025-02-10 DIAGNOSIS — I10 PRIMARY HYPERTENSION: ICD-10-CM

## 2025-02-10 DIAGNOSIS — F32.A DEPRESSION, UNSPECIFIED DEPRESSION TYPE: ICD-10-CM

## 2025-02-10 DIAGNOSIS — H81.10 BENIGN PAROXYSMAL POSITIONAL VERTIGO, UNSPECIFIED LATERALITY: Primary | ICD-10-CM

## 2025-02-10 DIAGNOSIS — F41.9 ANXIETY: ICD-10-CM

## 2025-02-10 DIAGNOSIS — M81.0 AGE-RELATED OSTEOPOROSIS WITHOUT CURRENT PATHOLOGICAL FRACTURE: ICD-10-CM

## 2025-02-10 DIAGNOSIS — E07.9 THYROID DISEASE: ICD-10-CM

## 2025-02-10 RX ORDER — MIRTAZAPINE 7.5 MG/1
7.5 TABLET, FILM COATED ORAL NIGHTLY
Qty: 30 TABLET | Refills: 5 | Status: SHIPPED
Start: 2025-02-10 | End: 2025-02-10 | Stop reason: ALTCHOICE

## 2025-02-10 RX ORDER — MIRTAZAPINE 15 MG/1
15 TABLET, FILM COATED ORAL NIGHTLY
Qty: 30 TABLET | Refills: 5 | Status: SHIPPED | OUTPATIENT
Start: 2025-02-10

## 2025-02-10 RX ORDER — MULTIVITAMIN WITH IRON
500 TABLET ORAL DAILY
Qty: 30 TABLET | Refills: 5 | Status: SHIPPED | OUTPATIENT
Start: 2025-02-10 | End: 2025-08-09

## 2025-02-10 RX ORDER — LORAZEPAM 0.5 MG/1
0.5 TABLET ORAL 2 TIMES DAILY
Qty: 60 TABLET | Refills: 0 | Status: SHIPPED | OUTPATIENT
Start: 2025-02-10 | End: 2025-03-12

## 2025-02-10 SDOH — ECONOMIC STABILITY: FOOD INSECURITY: WITHIN THE PAST 12 MONTHS, YOU WORRIED THAT YOUR FOOD WOULD RUN OUT BEFORE YOU GOT MONEY TO BUY MORE.: NEVER TRUE

## 2025-02-10 SDOH — ECONOMIC STABILITY: FOOD INSECURITY: WITHIN THE PAST 12 MONTHS, THE FOOD YOU BOUGHT JUST DIDN'T LAST AND YOU DIDN'T HAVE MONEY TO GET MORE.: NEVER TRUE

## 2025-02-10 NOTE — PROGRESS NOTES
2/10/25  Yecenia Haro : 1940 Sex: female  Age: 84 y.o.    Chief Complaint   Patient presents with    Follow-up     1 month follow up       HPI   overall check up s    Review of Systems   Constitutional:  Negative for activity change, appetite change, chills, diaphoresis, fatigue, fever and unexpected weight change.        Still greiving  death and difficult to live with son ,very difficult circumstances    HENT:  Negative for congestion, dental problem, drooling, ear discharge, ear pain, facial swelling, hearing loss, mouth sores, nosebleeds, rhinorrhea, sinus pressure, sinus pain, sneezing, sore throat, tinnitus, trouble swallowing and voice change. Postnasal drip: stidrainage posterior through.        Had slurred speech    Eyes:  Negative for photophobia, pain, discharge, redness, itching and visual disturbance.   Respiratory:  Negative for apnea, choking, wheezing and stridor. Cough: has waned. Chest tightness: tenderness from muscles. Shortness of breath: mild.   Cardiovascular:  Negative for chest pain, palpitations and leg swelling.   Gastrointestinal:  Negative for abdominal distention, abdominal pain, anal bleeding, blood in stool, constipation, diarrhea, nausea, rectal pain and vomiting.        Appetite is still a problem    Endocrine: Negative for cold intolerance, heat intolerance, polydipsia, polyphagia and polyuria.   Genitourinary:  Negative for decreased urine volume, difficulty urinating, dysuria, enuresis, flank pain, frequency, genital sores, hematuria and urgency.   Musculoskeletal:  Positive for arthralgias (left knee pain left shoulder pain). Negative for back pain, gait problem, joint swelling, myalgias, neck pain and neck stiffness.        No new neck or back pain   Did not hit head   Was crying a lot but says not hurt      Skin:  Negative for color change, pallor, rash and wound.   Allergic/Immunologic: Negative for environmental allergies, food allergies and

## 2025-02-10 NOTE — TELEPHONE ENCOUNTER
Received call from pharmacy to verify the change in pt remeron dose. Informed them dose changed at todays visit.

## 2025-02-17 ENCOUNTER — LAB (OUTPATIENT)
Dept: FAMILY MEDICINE CLINIC | Age: 85
End: 2025-02-17
Payer: MEDICARE

## 2025-02-17 DIAGNOSIS — E83.42 HYPOMAGNESEMIA: ICD-10-CM

## 2025-02-17 DIAGNOSIS — I10 PRIMARY HYPERTENSION: ICD-10-CM

## 2025-02-17 DIAGNOSIS — E53.8 VITAMIN B12 DEFICIENCY (NON ANEMIC): ICD-10-CM

## 2025-02-17 DIAGNOSIS — F41.9 ANXIETY: ICD-10-CM

## 2025-02-17 DIAGNOSIS — M81.0 AGE-RELATED OSTEOPOROSIS WITHOUT CURRENT PATHOLOGICAL FRACTURE: ICD-10-CM

## 2025-02-17 DIAGNOSIS — H81.10 BENIGN PAROXYSMAL POSITIONAL VERTIGO, UNSPECIFIED LATERALITY: ICD-10-CM

## 2025-02-17 LAB
ALBUMIN: 4.4 G/DL (ref 3.5–5.2)
ALP BLD-CCNC: 67 U/L (ref 35–104)
ALT SERPL-CCNC: 6 U/L (ref 0–32)
ANION GAP SERPL CALCULATED.3IONS-SCNC: 9 MMOL/L (ref 7–16)
AST SERPL-CCNC: 16 U/L (ref 0–31)
BASOPHILS ABSOLUTE: 0.03 K/UL (ref 0–0.2)
BASOPHILS RELATIVE PERCENT: 1 % (ref 0–2)
BILIRUB SERPL-MCNC: 0.5 MG/DL (ref 0–1.2)
BUN BLDV-MCNC: 7 MG/DL (ref 6–23)
CALCIUM SERPL-MCNC: 9.4 MG/DL (ref 8.6–10.2)
CHLORIDE BLD-SCNC: 108 MMOL/L (ref 98–107)
CHOLESTEROL, TOTAL: 261 MG/DL
CO2: 28 MMOL/L (ref 22–29)
CREAT SERPL-MCNC: 0.7 MG/DL (ref 0.5–1)
EOSINOPHILS ABSOLUTE: 0.06 K/UL (ref 0.05–0.5)
EOSINOPHILS RELATIVE PERCENT: 1 % (ref 0–6)
GFR, ESTIMATED: 82 ML/MIN/1.73M2
GLUCOSE BLD-MCNC: 91 MG/DL (ref 74–99)
HCT VFR BLD CALC: 46 % (ref 34–48)
HDLC SERPL-MCNC: 52 MG/DL
HEMOGLOBIN: 14.2 G/DL (ref 11.5–15.5)
IMMATURE GRANULOCYTES %: 0 % (ref 0–5)
IMMATURE GRANULOCYTES ABSOLUTE: <0.03 K/UL (ref 0–0.58)
LDL CHOLESTEROL: 178 MG/DL
LYMPHOCYTES ABSOLUTE: 2.2 K/UL (ref 1.5–4)
LYMPHOCYTES RELATIVE PERCENT: 43 % (ref 20–42)
MCH RBC QN AUTO: 29 PG (ref 26–35)
MCHC RBC AUTO-ENTMCNC: 30.9 G/DL (ref 32–34.5)
MCV RBC AUTO: 94.1 FL (ref 80–99.9)
MONOCYTES ABSOLUTE: 0.42 K/UL (ref 0.1–0.95)
MONOCYTES RELATIVE PERCENT: 8 % (ref 2–12)
NEUTROPHILS ABSOLUTE: 2.41 K/UL (ref 1.8–7.3)
NEUTROPHILS RELATIVE PERCENT: 47 % (ref 43–80)
PDW BLD-RTO: 14.1 % (ref 11.5–15)
PLATELET # BLD: 335 K/UL (ref 130–450)
PMV BLD AUTO: 10.1 FL (ref 7–12)
POTASSIUM SERPL-SCNC: 5.1 MMOL/L (ref 3.5–5)
RBC # BLD: 4.89 M/UL (ref 3.5–5.5)
SODIUM BLD-SCNC: 145 MMOL/L (ref 132–146)
T4 FREE: 1.2 NG/DL (ref 0.9–1.7)
TOTAL PROTEIN: 7.5 G/DL (ref 6.4–8.3)
TRIGL SERPL-MCNC: 153 MG/DL
TSH SERPL DL<=0.05 MIU/L-ACNC: 2.55 UIU/ML (ref 0.27–4.2)
VLDLC SERPL CALC-MCNC: 31 MG/DL
WBC # BLD: 5.1 K/UL (ref 4.5–11.5)

## 2025-02-17 PROCEDURE — 36415 COLL VENOUS BLD VENIPUNCTURE: CPT | Performed by: FAMILY MEDICINE

## 2025-03-10 ENCOUNTER — OFFICE VISIT (OUTPATIENT)
Dept: FAMILY MEDICINE CLINIC | Age: 85
End: 2025-03-10

## 2025-03-10 VITALS
SYSTOLIC BLOOD PRESSURE: 138 MMHG | HEART RATE: 79 BPM | RESPIRATION RATE: 16 BRPM | BODY MASS INDEX: 21.02 KG/M2 | DIASTOLIC BLOOD PRESSURE: 80 MMHG | HEIGHT: 62 IN | OXYGEN SATURATION: 97 % | TEMPERATURE: 97.7 F | WEIGHT: 114.2 LBS

## 2025-03-10 DIAGNOSIS — F32.A DEPRESSION, UNSPECIFIED DEPRESSION TYPE: ICD-10-CM

## 2025-03-10 DIAGNOSIS — E78.2 MIXED HYPERLIPIDEMIA: ICD-10-CM

## 2025-03-10 DIAGNOSIS — H81.10 BENIGN PAROXYSMAL POSITIONAL VERTIGO, UNSPECIFIED LATERALITY: ICD-10-CM

## 2025-03-10 DIAGNOSIS — F41.0 ANXIETY ATTACK: ICD-10-CM

## 2025-03-10 DIAGNOSIS — F41.9 ANXIETY: ICD-10-CM

## 2025-03-10 DIAGNOSIS — I10 PRIMARY HYPERTENSION: Primary | ICD-10-CM

## 2025-03-10 DIAGNOSIS — E83.42 HYPOMAGNESEMIA: ICD-10-CM

## 2025-03-10 DIAGNOSIS — C69.20 MALIGNANT NEOPLASM OF RETINA, UNSPECIFIED LATERALITY (HCC): ICD-10-CM

## 2025-03-10 RX ORDER — ATORVASTATIN CALCIUM 10 MG/1
10 TABLET, FILM COATED ORAL DAILY
Qty: 30 TABLET | Refills: 5 | Status: SHIPPED | OUTPATIENT
Start: 2025-03-10

## 2025-03-10 RX ORDER — ATORVASTATIN CALCIUM 10 MG/1
10 TABLET, FILM COATED ORAL DAILY
Qty: 90 TABLET | Refills: 1 | Status: SHIPPED
Start: 2025-03-10 | End: 2025-03-10 | Stop reason: CLARIF

## 2025-03-10 NOTE — PROGRESS NOTES
Weakness present.      Coordination: Coordination normal.      Comments: Since that 1 episode she has had no more slurred speech she now is doing very well with everything that she is operating neurologically and not doing any problems   Psychiatric:         Mood and Affect: Mood normal.         Behavior: Behavior normal.      Comments: Obsessive-compulsive and depressed along with anxiety she is not suicidal at this time    SEVERE GRIEF REACTIONS            Assessment and Plan:  Yecenia was seen today for 1 month follow-up.    Diagnoses and all orders for this visit:    Primary hypertension    Mixed hyperlipidemia    Malignant neoplasm of retina, unspecified laterality (HCC)    Depression, unspecified depression type    Hypomagnesemia    Benign paroxysmal positional vertigo, unspecified laterality    Anxiety attack    Anxiety    Other orders  -     Discontinue: atorvastatin (LIPITOR) 10 MG tablet; Take 1 tablet by mouth daily  -     atorvastatin (LIPITOR) 10 MG tablet; Take 1 tablet by mouth daily          Discussions/Education provided to patients during visit:  [] Discussed the importance to stop smoking.  [] Advised to monitor eating habits.  [] Reviewed and discussed Imaging results.  [] Reviewed and discussed Lab results.  [] Discussed the importance of drinking plenty of fluids.  [] Cut down on Salt, Caffeine, and Sugar.  [] Non Compliant Patient   [x] Communicated with patient any concerns, to phone office.  Reviewed blood work   Recheck 1 month       Time spent face to face with patient discussing medications and labs: 40 minutes.        Return in about 4 weeks (around 4/7/2025).      Seen by:     Vazquez Little DO

## 2025-03-26 ENCOUNTER — TELEPHONE (OUTPATIENT)
Dept: FAMILY MEDICINE CLINIC | Age: 85
End: 2025-03-26

## 2025-03-26 NOTE — TELEPHONE ENCOUNTER
Pt stopped by the office to let us know she has stopped taking her Atorvastatin 10 mg due to it making her sick. She was getting dizzy, loss of appetite, feeling detatched and feeling like she was going to pass out. Stated she would rather change the way she is eating than take a pill for it. Discussed with her to reduce fried foods, red meat and fatty foods. To increase fruits and vegies. Also to incorporated more vitamin E in her diet by eating foods such as almonds, walnuts.

## 2025-04-08 DIAGNOSIS — F41.9 ANXIETY: ICD-10-CM

## 2025-04-08 RX ORDER — LORAZEPAM 0.5 MG/1
0.5 TABLET ORAL 2 TIMES DAILY
Qty: 60 TABLET | Refills: 0 | Status: SHIPPED | OUTPATIENT
Start: 2025-04-08 | End: 2025-05-08

## 2025-04-08 NOTE — TELEPHONE ENCOUNTER
Name of Medication(s) Requested:  Requested Prescriptions     Pending Prescriptions Disp Refills    LORazepam (ATIVAN) 0.5 MG tablet 60 tablet 0     Sig: Take 1 tablet by mouth 2 times daily for 30 days. Max Daily Amount: 1 mg       Medication is on current medication list Yes    Dosage and directions were verified? Yes    Quantity verified: 30 day supply     Pharmacy Verified?  Yes    Last Appointment:  3/10/2025    Future appts:  Future Appointments   Date Time Provider Department Center   4/11/2025  9:00 AM Vazquez Little DO [facility] DEP   6/16/2025 11:00 AM Vazquez Little DO [facility] DE[facility]        (If no appt send self scheduling link. .REFILLAPPT)  Scheduling request sent?     [] Yes  [x] No    Does patient need updated?  [] Yes  [x] No

## 2025-04-11 ENCOUNTER — OFFICE VISIT (OUTPATIENT)
Dept: FAMILY MEDICINE CLINIC | Age: 85
End: 2025-04-11

## 2025-04-11 VITALS
TEMPERATURE: 97.9 F | BODY MASS INDEX: 20.63 KG/M2 | HEIGHT: 62 IN | SYSTOLIC BLOOD PRESSURE: 134 MMHG | HEART RATE: 66 BPM | WEIGHT: 112.1 LBS | RESPIRATION RATE: 16 BRPM | DIASTOLIC BLOOD PRESSURE: 88 MMHG | OXYGEN SATURATION: 95 %

## 2025-04-11 DIAGNOSIS — F41.9 ANXIETY: ICD-10-CM

## 2025-04-11 DIAGNOSIS — I10 ESSENTIAL HYPERTENSION: ICD-10-CM

## 2025-04-11 DIAGNOSIS — T50.905S ADVERSE EFFECT OF DRUG, SEQUELA: Primary | ICD-10-CM

## 2025-04-11 DIAGNOSIS — E07.9 THYROID DISEASE: ICD-10-CM

## 2025-04-11 DIAGNOSIS — E78.2 MIXED HYPERLIPIDEMIA: ICD-10-CM

## 2025-04-11 DIAGNOSIS — H81.10 BENIGN PAROXYSMAL POSITIONAL VERTIGO, UNSPECIFIED LATERALITY: ICD-10-CM

## 2025-04-11 DIAGNOSIS — M19.90 ARTHRITIS: ICD-10-CM

## 2025-04-11 DIAGNOSIS — M81.0 AGE-RELATED OSTEOPOROSIS WITHOUT CURRENT PATHOLOGICAL FRACTURE: ICD-10-CM

## 2025-04-11 DIAGNOSIS — I10 PRIMARY HYPERTENSION: ICD-10-CM

## 2025-04-11 DIAGNOSIS — C80.1 CANCER (HCC): ICD-10-CM

## 2025-04-11 SDOH — ECONOMIC STABILITY: FOOD INSECURITY: WITHIN THE PAST 12 MONTHS, THE FOOD YOU BOUGHT JUST DIDN'T LAST AND YOU DIDN'T HAVE MONEY TO GET MORE.: NEVER TRUE

## 2025-04-11 SDOH — ECONOMIC STABILITY: FOOD INSECURITY: WITHIN THE PAST 12 MONTHS, YOU WORRIED THAT YOUR FOOD WOULD RUN OUT BEFORE YOU GOT MONEY TO BUY MORE.: NEVER TRUE

## 2025-04-11 NOTE — PROGRESS NOTES
25  Yecenia Haro : 1940 Sex: female  Age: 84 y.o.    Chief Complaint   Patient presents with    1 Month Follow-Up       HPI  overall check up   Follow up on meds   Dr Mckinnon helping with eyes discuss       Review of Systems   Constitutional:  Negative for activity change, appetite change, chills, diaphoresis, fatigue, fever and unexpected weight change.        Still greiving  death and difficult to live with son ,very difficult circumstances    HENT:  Negative for congestion, dental problem, drooling, ear discharge, ear pain, facial swelling, hearing loss, mouth sores, nosebleeds, rhinorrhea, sinus pressure, sinus pain, sneezing, sore throat, tinnitus, trouble swallowing and voice change. Postnasal drip: stidrainage posterior through.        Had slurred speech    Eyes:  Negative for photophobia, pain, discharge, redness, itching and visual disturbance.   Respiratory:  Negative for apnea, choking, wheezing and stridor. Cough: has waned. Chest tightness: tenderness from muscles. Shortness of breath: mild.   Cardiovascular:  Negative for chest pain, palpitations and leg swelling.   Gastrointestinal:  Negative for abdominal distention, abdominal pain, anal bleeding, blood in stool, constipation, diarrhea, nausea, rectal pain and vomiting.        Appetite is still a problem    Endocrine: Negative for cold intolerance, heat intolerance, polydipsia, polyphagia and polyuria.   Genitourinary:  Negative for decreased urine volume, difficulty urinating, dysuria, enuresis, flank pain, frequency, genital sores, hematuria and urgency.   Musculoskeletal:  Positive for arthralgias (left knee pain left shoulder pain). Negative for back pain, gait problem, joint swelling, myalgias, neck pain and neck stiffness.        No new neck or back pain   Did not hit head   Was crying a lot but says not hurt      Skin:  Negative for color change, pallor, rash and wound.   Allergic/Immunologic: Negative for

## 2025-04-11 NOTE — PATIENT INSTRUCTIONS
[place] [employer] - Financial Resources*  (Call [employer]/211 if need more resources.)       Utility:  [employer]  What they offer: Limited assistance to restore/ prevent utility disconnection.  Phone Number: [phone]  Address: [facility] [place] [address]  Website: [employer]  [facility] Action Program  Utility assistance  [phone]  [facility] Community Action Partnership  Utility assistance   [phone]  [employer] [facility]  Utility assistance  [phone]  Financial or Medical  HELP NETWORK OF [facility]:  What they do: Provides 24-hr, 7 days a week access to information on community resources for financial help. [facility] AND [facility]  Phone: 211 or [phone]            [facility] JOB AND FAMILY SERVICES:  MAIN [facility] LINE FOR ALL [facility]: [phone]  What they do: [place] works first with temporary cash assistance if there are children in household.   [facility] DJFS: [address] [place] [address]  Phone: [phone], [phone]  [facility] DJFS: [address] [place] [address]  Phone: [phone]  [facility] DJFS: [address] [place] [address]  Phone: [phone]  Website: s.[place].[facility]    [employer] Financial Assistance  What they offer: Assistance with [employer] bills  Phone: [phone], option #5   Medications:  [employer]  What they offer: [employer] tracks prescription drug prices and provides free drug coupons for discounts on medications.  Website: https://www.[facility]  [employer]  What they offer: [employer] offers free information on medications and healthcare cost savings programs including prescription assistance programs, coupons, and discount programs.  Helpline: 675.481.1864  Website: https://www.needDattoeds.org    [employer]  What they offer: Information about free and low-cost medicine programs.  Website:

## 2025-05-05 DIAGNOSIS — F41.9 ANXIETY: ICD-10-CM

## 2025-05-05 RX ORDER — MIRTAZAPINE 15 MG/1
15 TABLET, FILM COATED ORAL NIGHTLY
Qty: 30 TABLET | Refills: 5 | Status: SHIPPED | OUTPATIENT
Start: 2025-05-05

## 2025-05-05 RX ORDER — LORAZEPAM 0.5 MG/1
0.5 TABLET ORAL 2 TIMES DAILY
Qty: 60 TABLET | Refills: 0 | Status: SHIPPED | OUTPATIENT
Start: 2025-05-05 | End: 2025-06-04

## 2025-05-05 RX ORDER — MULTIVITAMIN WITH IRON
500 TABLET ORAL DAILY
Qty: 30 TABLET | Refills: 5 | Status: SHIPPED | OUTPATIENT
Start: 2025-05-05 | End: 2025-11-01

## 2025-05-05 NOTE — TELEPHONE ENCOUNTER
Name of Medication(s) Requested:  Requested Prescriptions     Pending Prescriptions Disp Refills    LORazepam (ATIVAN) 0.5 MG tablet [Pharmacy Med Name: LORAZEPAM 0.5 MG TABLET] 60 tablet 0     Sig: Take 1 tablet by mouth 2 times daily for 30 days. Max Daily Amount: 1 mg    mirtazapine (REMERON) 15 MG tablet 30 tablet 5     Sig: Take 1 tablet by mouth nightly    vitamin B-12 (CYANOCOBALAMIN) 500 MCG tablet 30 tablet 5     Sig: Take 1 tablet by mouth daily       Medication is on current medication list Yes    Dosage and directions were verified? Yes    Quantity verified: 30 day supply     Pharmacy Verified?  Yes    Last Appointment:  4/11/2025    Future appts:  Future Appointments   Date Time Provider Department Center   5/12/2025 10:00 AM Vazquez Little DO [facility] DEP   6/16/2025 11:00 AM Vazquez Little DO [facility] DE[facility]        (If no appt send self scheduling link. .REFILLAPPT)  Scheduling request sent?     [] Yes  [x] No    Does patient need updated?  [] Yes  [x] No

## 2025-05-12 ENCOUNTER — OFFICE VISIT (OUTPATIENT)
Dept: FAMILY MEDICINE CLINIC | Age: 85
End: 2025-05-12
Payer: MEDICARE

## 2025-05-12 VITALS
BODY MASS INDEX: 20.81 KG/M2 | DIASTOLIC BLOOD PRESSURE: 80 MMHG | TEMPERATURE: 98.1 F | RESPIRATION RATE: 16 BRPM | WEIGHT: 113.1 LBS | HEIGHT: 62 IN | HEART RATE: 87 BPM | SYSTOLIC BLOOD PRESSURE: 134 MMHG | OXYGEN SATURATION: 98 %

## 2025-05-12 DIAGNOSIS — I10 PRIMARY HYPERTENSION: Primary | ICD-10-CM

## 2025-05-12 DIAGNOSIS — G45.9 TIA (TRANSIENT ISCHEMIC ATTACK): ICD-10-CM

## 2025-05-12 DIAGNOSIS — E07.9 THYROID DISEASE: ICD-10-CM

## 2025-05-12 DIAGNOSIS — E78.2 MIXED HYPERLIPIDEMIA: ICD-10-CM

## 2025-05-12 DIAGNOSIS — E83.42 HYPOMAGNESEMIA: ICD-10-CM

## 2025-05-12 DIAGNOSIS — F32.A DEPRESSION, UNSPECIFIED DEPRESSION TYPE: ICD-10-CM

## 2025-05-12 DIAGNOSIS — C69.90: ICD-10-CM

## 2025-05-12 DIAGNOSIS — H81.10 BENIGN PAROXYSMAL POSITIONAL VERTIGO, UNSPECIFIED LATERALITY: ICD-10-CM

## 2025-05-12 DIAGNOSIS — I10 ESSENTIAL HYPERTENSION: ICD-10-CM

## 2025-05-12 DIAGNOSIS — C69.20 MALIGNANT NEOPLASM OF RETINA, UNSPECIFIED LATERALITY (HCC): ICD-10-CM

## 2025-05-12 PROCEDURE — 99213 OFFICE O/P EST LOW 20 MIN: CPT | Performed by: FAMILY MEDICINE

## 2025-05-12 PROCEDURE — 1090F PRES/ABSN URINE INCON ASSESS: CPT | Performed by: FAMILY MEDICINE

## 2025-05-12 PROCEDURE — G8420 CALC BMI NORM PARAMETERS: HCPCS | Performed by: FAMILY MEDICINE

## 2025-05-12 PROCEDURE — G8427 DOCREV CUR MEDS BY ELIG CLIN: HCPCS | Performed by: FAMILY MEDICINE

## 2025-05-12 PROCEDURE — 1123F ACP DISCUSS/DSCN MKR DOCD: CPT | Performed by: FAMILY MEDICINE

## 2025-05-12 PROCEDURE — 3075F SYST BP GE 130 - 139MM HG: CPT | Performed by: FAMILY MEDICINE

## 2025-05-12 PROCEDURE — G8400 PT W/DXA NO RESULTS DOC: HCPCS | Performed by: FAMILY MEDICINE

## 2025-05-12 PROCEDURE — 1036F TOBACCO NON-USER: CPT | Performed by: FAMILY MEDICINE

## 2025-05-12 PROCEDURE — 1159F MED LIST DOCD IN RCRD: CPT | Performed by: FAMILY MEDICINE

## 2025-05-12 PROCEDURE — 1160F RVW MEDS BY RX/DR IN RCRD: CPT | Performed by: FAMILY MEDICINE

## 2025-05-12 PROCEDURE — 3079F DIAST BP 80-89 MM HG: CPT | Performed by: FAMILY MEDICINE

## 2025-05-12 NOTE — PROGRESS NOTES
25  Yecenia Haro : 1940 Sex: female  Age: 84 y.o.    Chief Complaint   Patient presents with    Follow-up     1 month follow up       HPI  overall check up   Eye problem   New knee brace     Review of Systems   Constitutional:  Negative for activity change, appetite change, chills, diaphoresis, fatigue, fever and unexpected weight change.        Still greiving  death and difficult to live with son ,very difficult circumstances    HENT:  Negative for congestion, dental problem, drooling, ear discharge, ear pain, facial swelling, hearing loss, mouth sores, nosebleeds, rhinorrhea, sinus pressure, sinus pain, sneezing, sore throat, tinnitus, trouble swallowing and voice change. Postnasal drip: stidrainage posterior through.        Had slurred speech    Eyes:  Negative for photophobia, pain, discharge, redness, itching and visual disturbance.   Respiratory:  Negative for apnea, choking, wheezing and stridor. Cough: has waned. Chest tightness: tenderness from muscles. Shortness of breath: mild.   Cardiovascular:  Negative for chest pain, palpitations and leg swelling.   Gastrointestinal:  Negative for abdominal distention, abdominal pain, anal bleeding, blood in stool, constipation, diarrhea, nausea, rectal pain and vomiting.        Appetite is still a problem    Endocrine: Negative for cold intolerance, heat intolerance, polydipsia, polyphagia and polyuria.   Genitourinary:  Negative for decreased urine volume, difficulty urinating, dysuria, enuresis, flank pain, frequency, genital sores, hematuria and urgency.   Musculoskeletal:  Positive for arthralgias (left knee pain left shoulder pain). Negative for back pain, gait problem, joint swelling, myalgias, neck pain and neck stiffness.        No new neck or back pain   Did not hit head   Was crying a lot but says not hurt      Skin:  Negative for color change, pallor, rash and wound.   Allergic/Immunologic: Negative for environmental allergies,

## 2025-06-03 DIAGNOSIS — F41.9 ANXIETY: ICD-10-CM

## 2025-06-03 RX ORDER — MIRTAZAPINE 15 MG/1
15 TABLET, FILM COATED ORAL NIGHTLY
Qty: 90 TABLET | Refills: 1 | Status: SHIPPED | OUTPATIENT
Start: 2025-06-03

## 2025-06-03 RX ORDER — LORAZEPAM 0.5 MG/1
0.5 TABLET ORAL 2 TIMES DAILY
Qty: 60 TABLET | Refills: 0 | Status: SHIPPED | OUTPATIENT
Start: 2025-06-03 | End: 2025-07-03

## 2025-06-03 NOTE — TELEPHONE ENCOUNTER
Name of Medication(s) Requested:  Requested Prescriptions     Pending Prescriptions Disp Refills    LORazepam (ATIVAN) 0.5 MG tablet 60 tablet 0     Sig: Take 1 tablet by mouth 2 times daily for 30 days. Max Daily Amount: 1 mg    mirtazapine (REMERON) 15 MG tablet 90 tablet 1     Sig: Take 1 tablet by mouth nightly       Medication is on current medication list Yes    Dosage and directions were verified? Yes    Quantity verified: 90 day supply     Pharmacy Verified?  Yes    Last Appointment:  5/12/2025    Future appts:  Future Appointments   Date Time Provider Department Center   6/9/2025 11:00 AM Vazquez Little DO [facility]   6/16/2025 11:00 AM Vazquez Little DO [facility]        (If no appt send self scheduling link. .REFILLAPPT)  Scheduling request sent?     [] Yes  [x] No    Does patient need updated?  [] Yes  [x] No

## 2025-06-09 ENCOUNTER — OFFICE VISIT (OUTPATIENT)
Dept: FAMILY MEDICINE CLINIC | Age: 85
End: 2025-06-09

## 2025-06-09 VITALS
BODY MASS INDEX: 20.78 KG/M2 | OXYGEN SATURATION: 98 % | HEIGHT: 62 IN | TEMPERATURE: 98.2 F | DIASTOLIC BLOOD PRESSURE: 78 MMHG | SYSTOLIC BLOOD PRESSURE: 124 MMHG | HEART RATE: 86 BPM | RESPIRATION RATE: 16 BRPM | WEIGHT: 112.9 LBS

## 2025-06-09 DIAGNOSIS — M19.90 ARTHRITIS: ICD-10-CM

## 2025-06-09 DIAGNOSIS — I10 PRIMARY HYPERTENSION: ICD-10-CM

## 2025-06-09 DIAGNOSIS — C69.20 MALIGNANT NEOPLASM OF RETINA, UNSPECIFIED LATERALITY (HCC): Primary | ICD-10-CM

## 2025-06-09 DIAGNOSIS — E07.9 THYROID DISEASE: ICD-10-CM

## 2025-06-09 DIAGNOSIS — E78.2 MIXED HYPERLIPIDEMIA: ICD-10-CM

## 2025-06-09 DIAGNOSIS — F32.A DEPRESSION, UNSPECIFIED DEPRESSION TYPE: ICD-10-CM

## 2025-06-09 DIAGNOSIS — M81.0 AGE-RELATED OSTEOPOROSIS WITHOUT CURRENT PATHOLOGICAL FRACTURE: ICD-10-CM

## 2025-06-09 DIAGNOSIS — F43.20 GRIEF REACTION: ICD-10-CM

## 2025-06-09 DIAGNOSIS — F41.9 ANXIETY: ICD-10-CM

## 2025-06-09 DIAGNOSIS — G45.9 TIA (TRANSIENT ISCHEMIC ATTACK): ICD-10-CM

## 2025-06-09 NOTE — PROGRESS NOTES
light-headedness. Negative for tremors, seizures, syncope, facial asymmetry, speech difficulty, numbness and headaches.        Slurred speech episode    Hematological:  Negative for adenopathy. Does not bruise/bleed easily.   Psychiatric/Behavioral:  Negative for agitation, behavioral problems, confusion, decreased concentration, hallucinations, self-injury, sleep disturbance and suicidal ideas. The patient is not nervous/anxious and is not hyperactive.         Extreme depression not suicidal extreme anxiety          Physical Exam  Vitals and nursing note reviewed.   Constitutional:       Appearance: Normal appearance.   HENT:      Head: Normocephalic.      Right Ear: Tympanic membrane is bulging.      Left Ear: Tenderness present. Tympanic membrane is erythematous and bulging.      Ears:      Comments: Clear      Mouth/Throat:      Pharynx: Oropharynx is clear.   Eyes:      Pupils: Pupils are equal, round, and reactive to light.      Comments: Artificial eye    Cardiovascular:      Rate and Rhythm: Normal rate and regular rhythm.      Pulses: Normal pulses.      Heart sounds: Normal heart sounds.   Pulmonary:      Effort: Pulmonary effort is normal.      Breath sounds: Normal breath sounds. No wheezing, rhonchi or rales.   Abdominal:      Palpations: Abdomen is soft.      Tenderness: There is no abdominal tenderness.      Comments: Tenderness on palpation swelling r wrist    Musculoskeletal:      Right wrist: Swelling and tenderness present.      Right hand: Tenderness present.      Cervical back: Tenderness present.      Comments: Left shoulder pain    Skin:     General: Skin is warm and dry.      Capillary Refill: Capillary refill takes less than 2 seconds.      Coloration: Skin is not pale.   Neurological:      Mental Status: She is alert and oriented to person, place, and time.      Motor: Weakness present.      Coordination: Coordination normal.      Comments: Since that 1 episode she has had no more slurred

## 2025-06-28 ASSESSMENT — ENCOUNTER SYMPTOMS
EYE REDNESS: 0
DIARRHEA: 0
TROUBLE SWALLOWING: 0
STRIDOR: 0
SORE THROAT: 0
CONSTIPATION: 0
FACIAL SWELLING: 0
NAUSEA: 0
VOMITING: 0
PHOTOPHOBIA: 0
EYE DISCHARGE: 0
RHINORRHEA: 0
RECTAL PAIN: 0
WHEEZING: 0
ABDOMINAL PAIN: 0
VOICE CHANGE: 0
BACK PAIN: 0
CHOKING: 0
ABDOMINAL DISTENTION: 0
ANAL BLEEDING: 0
EYE ITCHING: 0
EYE PAIN: 0
BLOOD IN STOOL: 0
COLOR CHANGE: 0
SINUS PRESSURE: 0
SINUS PAIN: 0
APNEA: 0

## 2025-07-07 ENCOUNTER — CARE COORDINATION (OUTPATIENT)
Dept: CARE COORDINATION | Age: 85
End: 2025-07-07

## 2025-07-07 DIAGNOSIS — F41.9 ANXIETY: Primary | ICD-10-CM

## 2025-07-07 RX ORDER — LORAZEPAM 0.5 MG/1
0.5 TABLET ORAL 2 TIMES DAILY
Qty: 60 TABLET | Refills: 0 | Status: SHIPPED | OUTPATIENT
Start: 2025-07-07 | End: 2025-08-06

## 2025-07-07 RX ORDER — LORAZEPAM 0.5 MG/1
0.5 TABLET ORAL 2 TIMES DAILY
COMMUNITY
End: 2025-07-07 | Stop reason: SDUPTHER

## 2025-07-07 NOTE — TELEPHONE ENCOUNTER
Name of Medication(s) Requested:  Requested Prescriptions      No prescriptions requested or ordered in this encounter       Medication is on current medication list Yes    Dosage and directions were verified? Yes    Quantity verified: 30 day supply     Pharmacy Verified?  Yes    Last Appointment:  6/9/2025    Future appts:  Future Appointments   Date Time Provider Department Center   7/11/2025 10:30 AM Vazquez Little DO [facility]   7/11/2025 11:30 AM Vazquez Little DO [facility]        (If no appt send self scheduling link. .REFILLAPPT)  Scheduling request sent?     [] Yes  [x] No    Does patient need updated?  [] Yes  [x] No

## 2025-07-11 ENCOUNTER — OFFICE VISIT (OUTPATIENT)
Dept: FAMILY MEDICINE CLINIC | Age: 85
End: 2025-07-11

## 2025-07-11 VITALS
TEMPERATURE: 98.3 F | BODY MASS INDEX: 20.59 KG/M2 | OXYGEN SATURATION: 98 % | HEART RATE: 73 BPM | DIASTOLIC BLOOD PRESSURE: 74 MMHG | HEIGHT: 62 IN | SYSTOLIC BLOOD PRESSURE: 138 MMHG | RESPIRATION RATE: 16 BRPM | WEIGHT: 111.9 LBS

## 2025-07-11 DIAGNOSIS — I10 PRIMARY HYPERTENSION: ICD-10-CM

## 2025-07-11 DIAGNOSIS — H81.10 BENIGN PAROXYSMAL POSITIONAL VERTIGO, UNSPECIFIED LATERALITY: ICD-10-CM

## 2025-07-11 DIAGNOSIS — F43.20 GRIEF REACTION: ICD-10-CM

## 2025-07-11 DIAGNOSIS — C80.1 CANCER (HCC): ICD-10-CM

## 2025-07-11 DIAGNOSIS — M81.0 AGE-RELATED OSTEOPOROSIS WITHOUT CURRENT PATHOLOGICAL FRACTURE: ICD-10-CM
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
## 2025-07-11 DIAGNOSIS — E53.8 VITAMIN B12 DEFICIENCY (NON ANEMIC): ICD-10-CM

## 2025-07-11 DIAGNOSIS — I10 ESSENTIAL HYPERTENSION: ICD-10-CM

## 2025-07-11 DIAGNOSIS — F41.9 ANXIETY: ICD-10-CM

## 2025-07-11 DIAGNOSIS — E78.2 MIXED HYPERLIPIDEMIA: ICD-10-CM

## 2025-07-11 RX ORDER — CEPHALEXIN 500 MG/1
500 CAPSULE ORAL 3 TIMES DAILY
Qty: 30 CAPSULE | Refills: 0 | Status: SHIPPED | OUTPATIENT
Start: 2025-07-11 | End: 2025-07-21

## 2025-07-11 NOTE — PROGRESS NOTES
Coordination normal.      Comments: Since that 1 episode she has had no more slurred speech she now is doing very well with everything that she is operating neurologically and not doing any problems   Psychiatric:         Mood and Affect: Mood normal.         Behavior: Behavior normal.      Comments: Obsessive-compulsive and depressed along with anxiety she is not suicidal at this time    SEVERE GRIEF REACTIONS            Assessment and Plan:  Yecenia was seen today for follow-up.    Diagnoses and all orders for this visit:    Bite or sting by insect with infection    Anxiety    Grief reaction    Cancer (HCC)    Primary hypertension    Age-related osteoporosis without current pathological fracture    Mixed hyperlipidemia    Essential hypertension    Benign paroxysmal positional vertigo, unspecified laterality    Vitamin B12 deficiency (non anemic)    Other orders  -     cephALEXin (KEFLEX) 500 MG capsule; Take 1 capsule by mouth 3 times daily for 10 days          Discussions/Education provided to patients during visit:  [] Discussed the importance to stop smoking.  [] Advised to monitor eating habits.  [] Reviewed and discussed Imaging results.  [] Reviewed and discussed Lab results.  [] Discussed the importance of drinking plenty of fluids.  [] Cut down on Salt, Caffeine, and Sugar.  [] Non Compliant Patient   [x] Communicated with patient any concerns, to phone office.  Possible tick bite   Keflex 500 tid x 10 days   Recheck 1 month   Continue anti anxiety meds         Time spent face to face with patient discussing medications and labs: 30 minutes.        No follow-ups on file.      Seen by:     Vazquez Little DO

## 2025-08-01 DIAGNOSIS — F41.9 ANXIETY: ICD-10-CM

## 2025-08-01 RX ORDER — LORAZEPAM 0.5 MG/1
0.5 TABLET ORAL 2 TIMES DAILY
Qty: 60 TABLET | Refills: 0 | Status: SHIPPED | OUTPATIENT
Start: 2025-08-01 | End: 2025-08-31

## 2025-08-01 RX ORDER — MIRTAZAPINE 15 MG/1
15 TABLET, FILM COATED ORAL NIGHTLY
Qty: 90 TABLET | Refills: 1 | Status: SHIPPED | OUTPATIENT
Start: 2025-08-01

## 2025-08-01 NOTE — TELEPHONE ENCOUNTER
Name of Medication(s) Requested:  Requested Prescriptions     Pending Prescriptions Disp Refills    LORazepam (ATIVAN) 0.5 MG tablet 60 tablet 0     Sig: Take 1 tablet by mouth 2 times daily for 30 days. Max Daily Amount: 1 mg    mirtazapine (REMERON) 15 MG tablet 90 tablet 1     Sig: Take 1 tablet by mouth nightly       Medication is on current medication list Yes    Dosage and directions were verified? Yes    Quantity verified: 30 day supply     Pharmacy Verified?  Yes    Last Appointment:  7/11/2025    Future appts:  Future Appointments   Date Time Provider Department Center   8/21/2025 10:30 AM Vazquez Little DO [facility]   8/21/2025 11:00 AM Vazquez Little DO [facility]        (If no appt send self scheduling link. .REFILLAPPT)  Scheduling request sent?     [] Yes  [x] No    Does patient need updated?  [] Yes  [x] No  
Walker

## 2025-08-22 ENCOUNTER — OFFICE VISIT (OUTPATIENT)
Dept: FAMILY MEDICINE CLINIC | Age: 85
End: 2025-08-22
Payer: MEDICARE

## 2025-08-22 VITALS
DIASTOLIC BLOOD PRESSURE: 78 MMHG | HEART RATE: 72 BPM | HEIGHT: 62 IN | OXYGEN SATURATION: 99 % | RESPIRATION RATE: 16 BRPM | SYSTOLIC BLOOD PRESSURE: 120 MMHG | BODY MASS INDEX: 20.52 KG/M2 | WEIGHT: 111.5 LBS | TEMPERATURE: 98 F

## 2025-08-22 DIAGNOSIS — E07.9 THYROID DISEASE: ICD-10-CM

## 2025-08-22 DIAGNOSIS — E78.2 MIXED HYPERLIPIDEMIA: ICD-10-CM

## 2025-08-22 DIAGNOSIS — F41.9 ANXIETY: ICD-10-CM

## 2025-08-22 DIAGNOSIS — I10 PRIMARY HYPERTENSION: ICD-10-CM

## 2025-08-22 DIAGNOSIS — J01.90 ACUTE SINUSITIS, RECURRENCE NOT SPECIFIED, UNSPECIFIED LOCATION: ICD-10-CM

## 2025-08-22 DIAGNOSIS — H81.10 BENIGN PAROXYSMAL POSITIONAL VERTIGO, UNSPECIFIED LATERALITY: ICD-10-CM

## 2025-08-22 DIAGNOSIS — J40 BRONCHITIS: Primary | ICD-10-CM

## 2025-08-22 DIAGNOSIS — I10 ESSENTIAL HYPERTENSION: ICD-10-CM

## 2025-08-22 DIAGNOSIS — M19.90 ARTHRITIS: ICD-10-CM

## 2025-08-22 LAB
ALBUMIN: 4.4 G/DL (ref 3.5–5.2)
ALP BLD-CCNC: 67 U/L (ref 35–104)
ALT SERPL-CCNC: 10 U/L (ref 0–35)
ANION GAP SERPL CALCULATED.3IONS-SCNC: 13 MMOL/L (ref 7–16)
AST SERPL-CCNC: 25 U/L (ref 0–35)
BASOPHILS ABSOLUTE: 0.03 K/UL (ref 0–0.2)
BASOPHILS RELATIVE PERCENT: 1 % (ref 0–2)
BILIRUB SERPL-MCNC: 0.4 MG/DL (ref 0–1.2)
BUN BLDV-MCNC: 6 MG/DL (ref 8–23)
CALCIUM SERPL-MCNC: 9.8 MG/DL (ref 8.8–10.2)
CHLORIDE BLD-SCNC: 104 MMOL/L (ref 98–107)
CHOLESTEROL, TOTAL: 272 MG/DL
CO2: 24 MMOL/L (ref 22–29)
CREAT SERPL-MCNC: 0.6 MG/DL (ref 0.5–1)
EOSINOPHILS ABSOLUTE: 0.05 K/UL (ref 0.05–0.5)
EOSINOPHILS RELATIVE PERCENT: 1 % (ref 0–6)
GFR, ESTIMATED: 86 ML/MIN/1.73M2
GLUCOSE BLD-MCNC: 95 MG/DL (ref 74–99)
HCT VFR BLD CALC: 44 % (ref 34–48)
HDLC SERPL-MCNC: 54 MG/DL
HEMOGLOBIN: 13.9 G/DL (ref 11.5–15.5)
IMMATURE GRANULOCYTES %: 1 % (ref 0–5)
IMMATURE GRANULOCYTES ABSOLUTE: 0.03 K/UL (ref 0–0.58)
LDL CHOLESTEROL: 196 MG/DL
LYMPHOCYTES ABSOLUTE: 2.38 K/UL (ref 1.5–4)
LYMPHOCYTES RELATIVE PERCENT: 40 % (ref 20–42)
MCH RBC QN AUTO: 28.9 PG (ref 26–35)
MCHC RBC AUTO-ENTMCNC: 31.6 G/DL (ref 32–34.5)
MCV RBC AUTO: 91.5 FL (ref 80–99.9)
MONOCYTES ABSOLUTE: 0.44 K/UL (ref 0.1–0.95)
MONOCYTES RELATIVE PERCENT: 8 % (ref 2–12)
NEUTROPHILS ABSOLUTE: 2.97 K/UL (ref 1.8–7.3)
NEUTROPHILS RELATIVE PERCENT: 50 % (ref 43–80)
PDW BLD-RTO: 13.6 % (ref 11.5–15)
PLATELET # BLD: 428 K/UL (ref 130–450)
PMV BLD AUTO: 9.7 FL (ref 7–12)
POTASSIUM SERPL-SCNC: 5 MMOL/L (ref 3.5–5.1)
RBC # BLD: 4.81 M/UL (ref 3.5–5.5)
SODIUM BLD-SCNC: 142 MMOL/L (ref 136–145)
T4 FREE: 1.2 NG/DL (ref 0.9–1.7)
TOTAL PROTEIN: 8.1 G/DL (ref 6.4–8.3)
TRIGL SERPL-MCNC: 107 MG/DL
TSH SERPL DL<=0.05 MIU/L-ACNC: 2.38 UIU/ML (ref 0.27–4.2)
VLDLC SERPL CALC-MCNC: 21 MG/DL
WBC # BLD: 5.9 K/UL (ref 4.5–11.5)

## 2025-08-22 PROCEDURE — 3078F DIAST BP <80 MM HG: CPT | Performed by: FAMILY MEDICINE

## 2025-08-22 PROCEDURE — 36415 COLL VENOUS BLD VENIPUNCTURE: CPT | Performed by: FAMILY MEDICINE

## 2025-08-22 PROCEDURE — G8420 CALC BMI NORM PARAMETERS: HCPCS | Performed by: FAMILY MEDICINE

## 2025-08-22 PROCEDURE — 1159F MED LIST DOCD IN RCRD: CPT | Performed by: FAMILY MEDICINE

## 2025-08-22 PROCEDURE — G8427 DOCREV CUR MEDS BY ELIG CLIN: HCPCS | Performed by: FAMILY MEDICINE

## 2025-08-22 PROCEDURE — 1036F TOBACCO NON-USER: CPT | Performed by: FAMILY MEDICINE

## 2025-08-22 PROCEDURE — 1123F ACP DISCUSS/DSCN MKR DOCD: CPT | Performed by: FAMILY MEDICINE

## 2025-08-22 PROCEDURE — 3074F SYST BP LT 130 MM HG: CPT | Performed by: FAMILY MEDICINE

## 2025-08-22 PROCEDURE — G8400 PT W/DXA NO RESULTS DOC: HCPCS | Performed by: FAMILY MEDICINE

## 2025-08-22 PROCEDURE — 99214 OFFICE O/P EST MOD 30 MIN: CPT | Performed by: FAMILY MEDICINE

## 2025-08-22 PROCEDURE — 1090F PRES/ABSN URINE INCON ASSESS: CPT | Performed by: FAMILY MEDICINE

## 2025-08-22 RX ORDER — LORAZEPAM 0.5 MG/1
0.5 TABLET ORAL 2 TIMES DAILY
Qty: 60 TABLET | Refills: 0 | Status: SHIPPED | OUTPATIENT
Start: 2025-08-22 | End: 2025-09-21

## 2025-08-22 RX ORDER — AMOXICILLIN 250 MG/1
250 CAPSULE ORAL 3 TIMES DAILY
Qty: 30 CAPSULE | Refills: 0 | Status: SHIPPED | OUTPATIENT
Start: 2025-08-22 | End: 2025-09-01